# Patient Record
Sex: MALE | Race: WHITE | NOT HISPANIC OR LATINO | Employment: FULL TIME | URBAN - METROPOLITAN AREA
[De-identification: names, ages, dates, MRNs, and addresses within clinical notes are randomized per-mention and may not be internally consistent; named-entity substitution may affect disease eponyms.]

---

## 2017-03-13 ENCOUNTER — ALLSCRIPTS OFFICE VISIT (OUTPATIENT)
Dept: OTHER | Facility: OTHER | Age: 52
End: 2017-03-13

## 2017-03-13 DIAGNOSIS — Z13.220 ENCOUNTER FOR SCREENING FOR LIPOID DISORDERS: ICD-10-CM

## 2017-03-13 DIAGNOSIS — E66.3 OVERWEIGHT: ICD-10-CM

## 2017-03-13 DIAGNOSIS — R73.03 PREDIABETES: ICD-10-CM

## 2017-03-13 DIAGNOSIS — Z12.5 ENCOUNTER FOR SCREENING FOR MALIGNANT NEOPLASM OF PROSTATE: ICD-10-CM

## 2017-03-13 DIAGNOSIS — R20.9 DISTURBANCE OF SKIN SENSATION: ICD-10-CM

## 2017-03-23 ENCOUNTER — LAB CONVERSION - ENCOUNTER (OUTPATIENT)
Dept: OTHER | Facility: OTHER | Age: 52
End: 2017-03-23

## 2017-03-23 LAB
25(OH)D3 SERPL-MCNC: 24 NG/ML (ref 30–100)
A/G RATIO (HISTORICAL): 2 (CALC) (ref 1–2.5)
ALBUMIN SERPL BCP-MCNC: 4.3 G/DL (ref 3.6–5.1)
ALP SERPL-CCNC: 56 U/L (ref 40–115)
ALT SERPL W P-5'-P-CCNC: 21 U/L (ref 9–46)
AST SERPL W P-5'-P-CCNC: 22 U/L (ref 10–35)
BACTERIA UR QL AUTO: NORMAL /HPF
BASOPHILS # BLD AUTO: 0.3 %
BASOPHILS # BLD AUTO: 19 CELLS/UL (ref 0–200)
BILIRUB SERPL-MCNC: 0.7 MG/DL (ref 0.2–1.2)
BILIRUB UR QL STRIP: NEGATIVE
BUN SERPL-MCNC: 17 MG/DL (ref 7–25)
BUN/CREA RATIO (HISTORICAL): ABNORMAL (CALC) (ref 6–22)
CALCIUM SERPL-MCNC: 9.2 MG/DL (ref 8.6–10.3)
CHLORIDE SERPL-SCNC: 103 MMOL/L (ref 98–110)
CHOLEST SERPL-MCNC: 159 MG/DL (ref 125–200)
CHOLEST/HDLC SERPL: 3.8 (CALC)
CO2 SERPL-SCNC: 29 MMOL/L (ref 20–31)
COLOR UR: YELLOW
COMMENT (HISTORICAL): CLEAR
CREAT SERPL-MCNC: 0.99 MG/DL (ref 0.7–1.33)
CULTURE RESULT (HISTORICAL): NORMAL
DEPRECATED RDW RBC AUTO: 13.3 % (ref 11–15)
EGFR AFRICAN AMERICAN (HISTORICAL): 100 ML/MIN/1.73M2
EGFR-AMERICAN CALC (HISTORICAL): 87 ML/MIN/1.73M2
EOSINOPHIL # BLD AUTO: 145 CELLS/UL (ref 15–500)
EOSINOPHIL # BLD AUTO: 2.3 %
FECAL OCCULT BLOOD DIAGNOSTIC (HISTORICAL): NEGATIVE
GAMMA GLOBULIN (HISTORICAL): 2.2 G/DL (CALC) (ref 1.9–3.7)
GLUCOSE (HISTORICAL): 108 MG/DL (ref 65–99)
GLUCOSE (HISTORICAL): NEGATIVE
HBA1C MFR BLD HPLC: 5.8 % OF TOTAL HGB
HCT VFR BLD AUTO: 42.2 % (ref 38.5–50)
HDLC SERPL-MCNC: 42 MG/DL
HGB BLD-MCNC: 14.2 G/DL (ref 13.2–17.1)
HYALINE CASTS #/AREA URNS LPF: NORMAL /LPF
KETONES UR STRIP-MCNC: NEGATIVE MG/DL
LDL CHOLESTEROL (HISTORICAL): 100 MG/DL (CALC)
LEUKOCYTE ESTERASE UR QL STRIP: NEGATIVE
LYMPHOCYTES # BLD AUTO: 2155 CELLS/UL (ref 850–3900)
LYMPHOCYTES # BLD AUTO: 34.2 %
MCH RBC QN AUTO: 28.8 PG (ref 27–33)
MCHC RBC AUTO-ENTMCNC: 33.7 G/DL (ref 32–36)
MCV RBC AUTO: 85.4 FL (ref 80–100)
MONOCYTES # BLD AUTO: 435 CELLS/UL (ref 200–950)
MONOCYTES (HISTORICAL): 6.9 %
NEUTROPHILS # BLD AUTO: 3547 CELLS/UL (ref 1500–7800)
NEUTROPHILS # BLD AUTO: 56.3 %
NITRITE UR QL STRIP: NEGATIVE
NON-HDL-CHOL (CHOL-HDL) (HISTORICAL): 117 MG/DL (CALC)
PH UR STRIP.AUTO: 6 [PH] (ref 5–8)
PLATELET # BLD AUTO: 216 THOUSAND/UL (ref 140–400)
PMV BLD AUTO: 8.9 FL (ref 7.5–12.5)
POTASSIUM SERPL-SCNC: 4.3 MMOL/L (ref 3.5–5.3)
PROSTATE SPECIFIC ANTIGEN TOTAL (HISTORICAL): 1.7 NG/ML
PROT UR STRIP-MCNC: NEGATIVE MG/DL
RBC # BLD AUTO: 4.94 MILLION/UL (ref 4.2–5.8)
RBC (HISTORICAL): NORMAL /HPF
SODIUM SERPL-SCNC: 138 MMOL/L (ref 135–146)
SP GR UR STRIP.AUTO: 1.01 (ref 1–1.03)
SQUAMOUS EPITHELIAL CELLS (HISTORICAL): NORMAL /HPF
TOTAL PROTEIN (HISTORICAL): 6.5 G/DL (ref 6.1–8.1)
TRIGL SERPL-MCNC: 87 MG/DL
TSH SERPL DL<=0.05 MIU/L-ACNC: 4.41 MIU/L (ref 0.4–4.5)
VIT B12 SERPL-MCNC: 264 PG/ML (ref 200–1100)
WBC # BLD AUTO: 6.3 THOUSAND/UL (ref 3.8–10.8)
WBC # BLD AUTO: NORMAL /HPF

## 2017-04-02 ENCOUNTER — OFFICE VISIT (OUTPATIENT)
Dept: URGENT CARE | Facility: MEDICAL CENTER | Age: 52
End: 2017-04-02
Payer: COMMERCIAL

## 2017-04-02 PROCEDURE — G0382 LEV 3 HOSP TYPE B ED VISIT: HCPCS

## 2017-04-02 PROCEDURE — S9083 URGENT CARE CENTER GLOBAL: HCPCS

## 2017-07-03 ENCOUNTER — ALLSCRIPTS OFFICE VISIT (OUTPATIENT)
Dept: OTHER | Facility: OTHER | Age: 52
End: 2017-07-03

## 2017-07-03 DIAGNOSIS — M79.671 PAIN OF RIGHT FOOT: ICD-10-CM

## 2017-07-03 DIAGNOSIS — R73.01 IMPAIRED FASTING GLUCOSE: ICD-10-CM

## 2017-07-03 DIAGNOSIS — R20.9 DISTURBANCE OF SKIN SENSATION: ICD-10-CM

## 2017-07-03 DIAGNOSIS — E55.9 VITAMIN D DEFICIENCY: ICD-10-CM

## 2017-07-03 DIAGNOSIS — M79.604 PAIN OF RIGHT LEG: ICD-10-CM

## 2017-07-10 ENCOUNTER — HOSPITAL ENCOUNTER (OUTPATIENT)
Dept: ULTRASOUND IMAGING | Facility: HOSPITAL | Age: 52
Discharge: HOME | End: 2017-07-10
Attending: FAMILY MEDICINE
Payer: COMMERCIAL

## 2017-07-10 DIAGNOSIS — M79.604 PAIN OF RIGHT LEG: ICD-10-CM

## 2017-07-10 PROCEDURE — 76882 US LMTD JT/FCL EVL NVASC XTR: CPT

## 2017-07-11 ENCOUNTER — GENERIC CONVERSION - ENCOUNTER (OUTPATIENT)
Dept: OTHER | Facility: OTHER | Age: 52
End: 2017-07-11

## 2017-07-13 ENCOUNTER — APPOINTMENT (OUTPATIENT)
Dept: RADIOLOGY | Facility: CLINIC | Age: 52
End: 2017-07-13
Payer: COMMERCIAL

## 2017-07-13 ENCOUNTER — TRANSCRIBE ORDERS (OUTPATIENT)
Dept: LAB | Facility: CLINIC | Age: 52
End: 2017-07-13

## 2017-07-13 ENCOUNTER — APPOINTMENT (OUTPATIENT)
Dept: LAB | Facility: CLINIC | Age: 52
End: 2017-07-13
Payer: COMMERCIAL

## 2017-07-13 DIAGNOSIS — R20.9 DISTURBANCE OF SKIN SENSATION: ICD-10-CM

## 2017-07-13 DIAGNOSIS — R73.01 IMPAIRED FASTING GLUCOSE: ICD-10-CM

## 2017-07-13 DIAGNOSIS — M79.671 PAIN OF RIGHT FOOT: ICD-10-CM

## 2017-07-13 DIAGNOSIS — E55.9 VITAMIN D DEFICIENCY: ICD-10-CM

## 2017-07-13 LAB
25(OH)D3 SERPL-MCNC: 28.9 NG/ML (ref 30–100)
CRP SERPL QL: 3.1 MG/L
ERYTHROCYTE [SEDIMENTATION RATE] IN BLOOD: 4 MM/HOUR (ref 0–10)
EST. AVERAGE GLUCOSE BLD GHB EST-MCNC: 120 MG/DL
FOLATE SERPL-MCNC: 19.3 NG/ML (ref 3.1–17.5)
HBA1C MFR BLD: 5.8 % (ref 4.2–6.3)
URATE SERPL-MCNC: 7.3 MG/DL (ref 4.2–8)
VIT B12 SERPL-MCNC: 301 PG/ML (ref 100–900)

## 2017-07-13 PROCEDURE — 85652 RBC SED RATE AUTOMATED: CPT

## 2017-07-13 PROCEDURE — 84550 ASSAY OF BLOOD/URIC ACID: CPT

## 2017-07-13 PROCEDURE — 83036 HEMOGLOBIN GLYCOSYLATED A1C: CPT

## 2017-07-13 PROCEDURE — 86140 C-REACTIVE PROTEIN: CPT

## 2017-07-13 PROCEDURE — 82306 VITAMIN D 25 HYDROXY: CPT

## 2017-07-13 PROCEDURE — 36415 COLL VENOUS BLD VENIPUNCTURE: CPT

## 2017-07-13 PROCEDURE — 82746 ASSAY OF FOLIC ACID SERUM: CPT

## 2017-07-13 PROCEDURE — 73630 X-RAY EXAM OF FOOT: CPT

## 2017-07-13 PROCEDURE — 82607 VITAMIN B-12: CPT

## 2017-07-14 ENCOUNTER — GENERIC CONVERSION - ENCOUNTER (OUTPATIENT)
Dept: OTHER | Facility: OTHER | Age: 52
End: 2017-07-14

## 2017-07-17 ENCOUNTER — GENERIC CONVERSION - ENCOUNTER (OUTPATIENT)
Dept: OTHER | Facility: OTHER | Age: 52
End: 2017-07-17

## 2017-07-24 DIAGNOSIS — E55.9 VITAMIN D DEFICIENCY: ICD-10-CM

## 2017-07-24 DIAGNOSIS — R73.03 PREDIABETES: ICD-10-CM

## 2017-07-24 DIAGNOSIS — I83.899 VARICOSE VEINS OF UNSPECIFIED LOWER EXTREMITIES WITH OTHER COMPLICATIONS (CODE): ICD-10-CM

## 2017-07-24 DIAGNOSIS — R20.9 DISTURBANCE OF SKIN SENSATION: ICD-10-CM

## 2017-08-23 ENCOUNTER — ALLSCRIPTS OFFICE VISIT (OUTPATIENT)
Dept: OTHER | Facility: OTHER | Age: 52
End: 2017-08-23

## 2017-08-30 ENCOUNTER — ALLSCRIPTS OFFICE VISIT (OUTPATIENT)
Dept: OTHER | Facility: OTHER | Age: 52
End: 2017-08-30

## 2017-09-20 ENCOUNTER — HOSPITAL ENCOUNTER (OUTPATIENT)
Dept: NON INVASIVE DIAGNOSTICS | Facility: CLINIC | Age: 52
Discharge: HOME | End: 2017-09-20
Payer: COMMERCIAL

## 2017-09-20 DIAGNOSIS — I83.899 VARICOSE VEINS OF UNSPECIFIED LOWER EXTREMITIES WITH OTHER COMPLICATIONS (CODE): ICD-10-CM

## 2017-09-20 PROCEDURE — 93971 EXTREMITY STUDY: CPT

## 2017-10-04 ENCOUNTER — GENERIC CONVERSION - ENCOUNTER (OUTPATIENT)
Dept: OTHER | Facility: OTHER | Age: 52
End: 2017-10-04

## 2017-10-28 ENCOUNTER — WALK IN (OUTPATIENT)
Dept: URGENT CARE | Age: 52
End: 2017-10-28

## 2017-10-28 VITALS
DIASTOLIC BLOOD PRESSURE: 86 MMHG | SYSTOLIC BLOOD PRESSURE: 126 MMHG | OXYGEN SATURATION: 97 % | TEMPERATURE: 97.5 F | RESPIRATION RATE: 20 BRPM | HEART RATE: 71 BPM

## 2017-10-28 DIAGNOSIS — J01.00 ACUTE MAXILLARY SINUSITIS, RECURRENCE NOT SPECIFIED: Primary | ICD-10-CM

## 2017-10-28 DIAGNOSIS — J20.9 ACUTE BRONCHITIS, UNSPECIFIED ORGANISM: ICD-10-CM

## 2017-10-28 PROCEDURE — 99204 OFFICE O/P NEW MOD 45 MIN: CPT | Performed by: FAMILY MEDICINE

## 2017-10-28 RX ORDER — DEXTROMETHORPHAN HYDROBROMIDE AND PROMETHAZINE HYDROCHLORIDE 15; 6.25 MG/5ML; MG/5ML
5 SYRUP ORAL 4 TIMES DAILY PRN
Qty: 120 ML | Refills: 0 | Status: SHIPPED | OUTPATIENT
Start: 2017-10-28

## 2017-10-28 RX ORDER — FLUTICASONE PROPIONATE 50 MCG
2 SPRAY, SUSPENSION (ML) NASAL DAILY PRN
Qty: 16 G | Refills: 0 | Status: SHIPPED | OUTPATIENT
Start: 2017-10-28

## 2017-10-28 RX ORDER — AZITHROMYCIN 250 MG/1
TABLET, FILM COATED ORAL
Qty: 6 TABLET | Refills: 0 | Status: SHIPPED | OUTPATIENT
Start: 2017-10-28 | End: 2017-11-02

## 2017-11-29 RX ORDER — MULTIVIT-MIN/IRON/FOLIC ACID/K 18-600-40
1 CAPSULE ORAL
COMMUNITY

## 2017-11-29 RX ORDER — DIPHENOXYLATE HYDROCHLORIDE AND ATROPINE SULFATE 2.5; .025 MG/1; MG/1
1 TABLET ORAL
COMMUNITY

## 2017-11-29 RX ORDER — IBUPROFEN 200 MG
TABLET ORAL EVERY 6 HOURS PRN
COMMUNITY
End: 2019-02-09 | Stop reason: ALTCHOICE

## 2017-11-29 NOTE — PRE-PROCEDURE INSTRUCTIONS
Pre-Surgery Instructions:   Medication Instructions    aspirin 81 MG tablet Instructed patient per Anesthesia Guidelines   Cholecalciferol (VITAMIN D) 2000 units CAPS Instructed patient per Anesthesia Guidelines   ibuprofen (MOTRIN) 200 mg tablet Instructed patient per Anesthesia Guidelines   multivitamin (THERAGRAN) TABS Instructed patient per Anesthesia Guidelines  Pt instructed to stop supplements and aspirin starting tomorrow for surgery on Friday  No medications needed am of surgery  Patient state understanding

## 2017-11-29 NOTE — PROGRESS NOTES
Pt instructed in use of chlorhexidine for pre op bathing per hospital protocol  Patient states understanding

## 2017-11-30 ENCOUNTER — ANESTHESIA EVENT (OUTPATIENT)
Dept: PERIOP | Facility: HOSPITAL | Age: 52
End: 2017-11-30
Payer: COMMERCIAL

## 2017-12-01 ENCOUNTER — ANESTHESIA (OUTPATIENT)
Dept: PERIOP | Facility: HOSPITAL | Age: 52
End: 2017-12-01
Payer: COMMERCIAL

## 2017-12-01 ENCOUNTER — HOSPITAL ENCOUNTER (OUTPATIENT)
Facility: HOSPITAL | Age: 52
Setting detail: OUTPATIENT SURGERY
Discharge: HOME | End: 2017-12-01
Attending: PODIATRIST | Admitting: PODIATRIST
Payer: COMMERCIAL

## 2017-12-01 ENCOUNTER — APPOINTMENT (OUTPATIENT)
Dept: RADIOLOGY | Facility: HOSPITAL | Age: 52
End: 2017-12-01
Payer: COMMERCIAL

## 2017-12-01 VITALS
HEART RATE: 61 BPM | OXYGEN SATURATION: 98 % | BODY MASS INDEX: 34.02 KG/M2 | HEIGHT: 71 IN | SYSTOLIC BLOOD PRESSURE: 109 MMHG | WEIGHT: 243 LBS | RESPIRATION RATE: 16 BRPM | TEMPERATURE: 97.4 F | DIASTOLIC BLOOD PRESSURE: 66 MMHG

## 2017-12-01 PROCEDURE — 73630 X-RAY EXAM OF FOOT: CPT

## 2017-12-01 RX ORDER — ONDANSETRON 2 MG/ML
4 INJECTION INTRAMUSCULAR; INTRAVENOUS EVERY 6 HOURS PRN
Status: DISCONTINUED | OUTPATIENT
Start: 2017-12-01 | End: 2017-12-01 | Stop reason: HOSPADM

## 2017-12-01 RX ORDER — KETOROLAC TROMETHAMINE 30 MG/ML
INJECTION, SOLUTION INTRAMUSCULAR; INTRAVENOUS AS NEEDED
Status: DISCONTINUED | OUTPATIENT
Start: 2017-12-01 | End: 2017-12-01 | Stop reason: SURG

## 2017-12-01 RX ORDER — BUPIVACAINE HYDROCHLORIDE 5 MG/ML
INJECTION, SOLUTION PERINEURAL AS NEEDED
Status: DISCONTINUED | OUTPATIENT
Start: 2017-12-01 | End: 2017-12-01 | Stop reason: HOSPADM

## 2017-12-01 RX ORDER — LIDOCAINE HYDROCHLORIDE 10 MG/ML
INJECTION, SOLUTION INFILTRATION; PERINEURAL AS NEEDED
Status: DISCONTINUED | OUTPATIENT
Start: 2017-12-01 | End: 2017-12-01 | Stop reason: SURG

## 2017-12-01 RX ORDER — MAGNESIUM HYDROXIDE 1200 MG/15ML
LIQUID ORAL AS NEEDED
Status: DISCONTINUED | OUTPATIENT
Start: 2017-12-01 | End: 2017-12-01 | Stop reason: HOSPADM

## 2017-12-01 RX ORDER — FENTANYL CITRATE/PF 50 MCG/ML
25 SYRINGE (ML) INJECTION
Status: DISCONTINUED | OUTPATIENT
Start: 2017-12-01 | End: 2017-12-01 | Stop reason: HOSPADM

## 2017-12-01 RX ORDER — ONDANSETRON 2 MG/ML
4 INJECTION INTRAMUSCULAR; INTRAVENOUS ONCE AS NEEDED
Status: DISCONTINUED | OUTPATIENT
Start: 2017-12-01 | End: 2017-12-01 | Stop reason: HOSPADM

## 2017-12-01 RX ORDER — OXYCODONE HYDROCHLORIDE 5 MG/1
5 TABLET ORAL EVERY 4 HOURS PRN
Status: DISCONTINUED | OUTPATIENT
Start: 2017-12-01 | End: 2017-12-01 | Stop reason: HOSPADM

## 2017-12-01 RX ORDER — PROPOFOL 10 MG/ML
INJECTION, EMULSION INTRAVENOUS AS NEEDED
Status: DISCONTINUED | OUTPATIENT
Start: 2017-12-01 | End: 2017-12-01 | Stop reason: SURG

## 2017-12-01 RX ORDER — MIDAZOLAM HYDROCHLORIDE 1 MG/ML
INJECTION INTRAMUSCULAR; INTRAVENOUS AS NEEDED
Status: DISCONTINUED | OUTPATIENT
Start: 2017-12-01 | End: 2017-12-01 | Stop reason: SURG

## 2017-12-01 RX ORDER — ONDANSETRON 2 MG/ML
INJECTION INTRAMUSCULAR; INTRAVENOUS AS NEEDED
Status: DISCONTINUED | OUTPATIENT
Start: 2017-12-01 | End: 2017-12-01 | Stop reason: SURG

## 2017-12-01 RX ORDER — SODIUM CHLORIDE 9 MG/ML
125 INJECTION, SOLUTION INTRAVENOUS CONTINUOUS
Status: DISCONTINUED | OUTPATIENT
Start: 2017-12-01 | End: 2017-12-01 | Stop reason: HOSPADM

## 2017-12-01 RX ADMIN — SODIUM CHLORIDE 125 ML/HR: 0.9 INJECTION, SOLUTION INTRAVENOUS at 06:35

## 2017-12-01 RX ADMIN — DEXAMETHASONE SODIUM PHOSPHATE 4 MG: 10 INJECTION INTRAMUSCULAR; INTRAVENOUS at 08:08

## 2017-12-01 RX ADMIN — PROPOFOL 200 MG: 10 INJECTION, EMULSION INTRAVENOUS at 07:30

## 2017-12-01 RX ADMIN — SODIUM CHLORIDE: 0.9 INJECTION, SOLUTION INTRAVENOUS at 07:40

## 2017-12-01 RX ADMIN — LIDOCAINE HYDROCHLORIDE 80 MG: 10 INJECTION, SOLUTION INFILTRATION; PERINEURAL at 07:30

## 2017-12-01 RX ADMIN — MIDAZOLAM HYDROCHLORIDE 4 MG: 1 INJECTION, SOLUTION INTRAMUSCULAR; INTRAVENOUS at 07:24

## 2017-12-01 RX ADMIN — ONDANSETRON HYDROCHLORIDE 4 MG: 2 INJECTION, SOLUTION INTRAVENOUS at 08:08

## 2017-12-01 RX ADMIN — KETOROLAC TROMETHAMINE 30 MG: 30 INJECTION, SOLUTION INTRAMUSCULAR at 08:17

## 2017-12-01 NOTE — DISCHARGE SUMMARY
Discharge Summary Outpatient Procedure Podiatry- Jyoti Dang 45 y o  male MRN: [id number]    Unit/Bed#: OR POOL Encounter: [id number]    Admission Date: 12/1/2017     Admitting Diagnosis: Hallux rigidus of right foot [M20 21]    Discharge Diagnosis: same    Procedures Performed: CHEILECTOMY:   Right foot    Complications: none    Condition at Discharge: stable    Discharge instructions/Information to patient and family:   See after visit summary for information provided to patient and family  Provisions for Follow-Up Care/Important appointments:  See after visit summary for information related to follow-up care and any pertinent home health orders  Discharge Medications:  See after visit summary for reconciled discharge medications provided to patient and family

## 2017-12-01 NOTE — DISCHARGE INSTRUCTIONS
[facility]  Dr Mateusz Saumels  Post-Operative Instructions    1  Take your prescribed medication as directed  2  Upon arrival at home, lie down and elevate your surgical foot on 2 pillows  3  Remain quiet, off your feet as much as possible, for the first 24-48 hours  This is when your feet first swell and may become painful  After 48 hours you may begin limited walking following these restrictions:   Weightbear as tolerated to surgical foot in surgical shoe  4  Drink large quantities of water and citrus fruit juice  Consume no alcohol  Continue a well-balanced diet  5  Report any unusual discomfort or fever to this office  6  A limited amount of discomfort and swelling is to be expected  In some cases the skin may take on a bruised appearance  The surgical solution that was applied to your foot prior to the operation is dark in color and the operation site may appear to be oozing when it actually is not  7  A slight amount of blood is to be expected, and is no cause for alarm  Do not remove the dressings  If there is active bleeding and if the bleeding persists, add additional gauze to the bandage, apply direct pressure, elevate your feet and call this office  8  Do not get the dressings wet  As regular bathing may be inconvenient, sponge baths are recommended  9  When anesthesia wears off and if any discomfort should be present, apply an ice pack directly over the operated area for 15 minute intervals for several hours or until the pain leaves  (USE IN EXCESS OF 15 MINUTES COULD CAUSE FROSTBITE)  Do not use hot water bags or electric pads  A convenient icepack can be made by placing ice cubes in a plastic bag and covering this with a towel  10  If necessary, take a mild laxative before retiring  11  Wear your special open shoes anytime you put weight on your foot, even if it is just to walk to the bathroom and back   It will probably be 2 or 3 weeks before you will be permitted to try regular shoes  12  Having performed the operation, we are interested in a prompt recovery  Please cooperate by following the above instructions  13  Please call to confirm your post-op appointment or call with any other questions

## 2017-12-01 NOTE — ANESTHESIA PREPROCEDURE EVALUATION
Review of Systems/Medical History          Cardiovascular  Negative cardio ROS    Pulmonary  Negative pulmonary ROS ,        GI/Hepatic  Negative GI/hepatic ROS          Negative  ROS        Endo/Other  Negative endo/other ROS      GYN       Hematology  Negative hematology ROS      Musculoskeletal  Obesity ,        Neurology  Negative neurology ROS      Psychology   Anxiety,            Physical Exam    Airway    Mallampati score: I  TM Distance: >3 FB  Neck ROM: full     Dental   No notable dental hx     Cardiovascular  Comment: Negative ROS, Rhythm: regular, Rate: normal, Cardiovascular exam normal    Pulmonary  Pulmonary exam normal     Other Findings        Anesthesia Plan  ASA Score- 2       Anesthesia Type- IV sedation with anesthesia with ASA Monitors  Additional Monitors:   Airway Plan:           Induction- intravenous  Informed Consent- Anesthetic plan and risks discussed with patient

## 2018-01-09 NOTE — RESULT NOTES
Verified Results  US EXTREMITY SOFT TISSUE [id number] 04:51PM Andrew Brown Order Number: [id number]   Performing Comments: 46 yo male with cystic area to the right lower leg, medial aspect, mid-tibia  Thanks  Fantasma Madison DO   - Patient Instructions: To schedule this appointment, please contact Central Scheduling at [phone]  Test Name Result Flag Reference   US EXTREMITY SOFT TISSUE (Report)     ULTRASOUND right leg     TECHNIQUE:  Using a high frequency transducer, the area of palpable concern on the right lower leg along the medial aspect of the calf was scanned with static and volumetric images obtained and reviewed  Grayscale and color Doppler techniques were    utilized  INDICATION: Palpable lump along the lower leg which has been present for many years but has increased in size  Patient denies pain  COMPARISON: None     FINDINGS:     The images which were obtained demonstrate an almost anechoic lesion which is 2 0 x 0 8 x 2 3 cm, however, the volumetric images obtained over this area demonstrate conclusively that this represents a dilated segment of a varicose vein which connects    with other less prominent venous channels in the area  Doppler evaluation does not demonstrate any significant internal flow suggesting that this has either extremely slow flow or is thrombosed  IMPRESSION:     Varicose vein  Either very slow flow or thrombosed  Workstation performed: [id number]     Signed by:   Zaria Davis MD   7/10/17       Plan  Varicose veins of lower extremity with other complication    · 1 - Nayeli LINARES, Vaishnavi Stephenson  (Vascular Surgery) Co-Management  *  46 yo male with dilated  varicosity chronically (approx 10 years, but getting worse) to the right lower leg,  apparently thrombosed on US  Thanks    Fantasma Madison DO  Status: Active  Requested  for: [id number]  Care Summary provided  : Yes

## 2018-01-12 VITALS
HEART RATE: 71 BPM | HEIGHT: 70 IN | TEMPERATURE: 97.8 F | SYSTOLIC BLOOD PRESSURE: 118 MMHG | BODY MASS INDEX: 36.43 KG/M2 | WEIGHT: 254.5 LBS | OXYGEN SATURATION: 98 % | DIASTOLIC BLOOD PRESSURE: 80 MMHG

## 2018-01-14 VITALS
SYSTOLIC BLOOD PRESSURE: 108 MMHG | HEART RATE: 84 BPM | BODY MASS INDEX: 36.35 KG/M2 | DIASTOLIC BLOOD PRESSURE: 82 MMHG | RESPIRATION RATE: 16 BRPM | WEIGHT: 253.31 LBS

## 2018-01-14 NOTE — RESULT NOTES
Verified Results  (1) URIC ACID 13Jul2017 02:26PM Jung Birch Order Number: [id number]     Test Name Result Flag Reference   URIC ACID 7 3 mg/dL  4 2-8 0   Specimen collection should occur prior to Metamizole administration due to the potential for falsely depressed results  (1) HEMOGLOBIN A1C 13Jul2017 02:26PM Jung Birch Order Number: [id number]     Test Name Result Flag Reference   HEMOGLOBIN A1C 5 8 %  4 2-6 3   EST  AVG  GLUCOSE 120 mg/dl       (1) VITAMIN D 25-HYDROXY 13Jul2017 02:26PM Jung Birch Order Number: [id number]     Test Name Result Flag Reference   VIT D 25-HYDROX 28 9 ng/mL L 30 0-100 0   This assay is a certified procedure of the CDC Vitamin D Standardization Certification Program (VDSCP)     Deficiency <20ng/ml   Insufficiency 20-30ng/ml   Sufficient  ng/ml     *Patients undergoing fluorescein dye angiography may retain small amounts of fluorescein in the body for 48-72 hours post procedure  Samples containing fluorescein can produce falsely elevated Vitamin D values  If the patient had this procedure, a specimen should be resubmitted post fluorescein clearance       (1) VITAMIN B12 13Jul2017 02:26PM Jung Birch Order Number: [id number]     Test Name Result Flag Reference   VITAMIN B12 301 pg/mL  100-900     (1) FOLATE 13Jul2017 02:26PM Jung Birch Order Number: [id number]     Test Name Result Flag Reference   FOLATE 19 3 ng/mL H 3 1-17 5     (1) SED RATE 13Jul2017 02:26PM Jung Birch Order Number: [id number]     Test Name Result Flag Reference   SED RATE 4 mm/hour  0-10     (1) C-REACTIVE PROTEIN 13Jul2017 02:26PM Jung Birch Order Number: [id number]     Test Name Result Flag Reference   C-REACT PROTEIN 3 1 mg/L H <3 0

## 2018-01-15 VITALS
HEIGHT: 70 IN | DIASTOLIC BLOOD PRESSURE: 76 MMHG | WEIGHT: 251.5 LBS | RESPIRATION RATE: 16 BRPM | SYSTOLIC BLOOD PRESSURE: 104 MMHG | BODY MASS INDEX: 36.01 KG/M2 | HEART RATE: 64 BPM

## 2018-01-17 NOTE — RESULT NOTES
Verified Results  * XR FOOT 3+ VIEW RIGHT [id number] 02:25PM Olu Navarrete Order Number: [id number]   Performing Comments: 46 yo male, with ongoing pain to the MTP joint - right great toe - suspected bunion  Thanks  Biren Gleason DO     Test Name Result Flag Reference   XR FOOT 3+ VW RIGHT (Report)     RIGHT FOOT     INDICATION: Right foot pain  COMPARISON: None     VIEWS: 3     IMAGES: 3     FINDINGS:     There is no acute fracture or dislocation  Marked narrowing and spurring at the 1st metatarsal phalangeal joint  Subchondral sclerosis  Soft tissue prominence medially  An inferior calcaneal spur is present  No lytic or blastic lesions are seen  Soft tissues are unremarkable  IMPRESSION:     No acute osseous abnormality  Marked arthritic changes at the 1st metatarsal-phalangeal joint  Soft tissue prominence compatible with a bunion         Workstation performed: [id number]     Signed by:   Isha Sierra MD   7/16/17

## 2018-01-17 NOTE — RESULT NOTES
Message   Can we mail slip to recheck labs, along with copy of test results  Thanks     Verified Results  (1) CBC/PLT/DIFF 22Mar2017 09:17AM Monica Santana     Test Name Result Flag Reference   WHITE BLOOD CELL COUNT 6 3 Thousand/uL  3 8-10 8   RED BLOOD CELL COUNT 4 94 Million/uL  4 20-5 80   HEMOGLOBIN 14 2 g/dL  13 2-17 1   HEMATOCRIT 42 2 %  38 5-50 0   MCV 85 4 fL  80 0-100 0   MCH 28 8 pg  27 0-33 0   EOSINOPHILS 2 3 %     BASOPHILS 0 3 %     ABSOLUTE MONOCYTES 435 cells/uL  200-950   ABSOLUTE EOSINOPHILS 145 cells/uL     ABSOLUTE BASOPHILS 19 cells/uL  0-200   NEUTROPHILS 56 3 %     LYMPHOCYTES 34 2 %     MONOCYTES 6 9 %     MCHC 33 7 g/dL  32 0-36 0   RDW 13 3 %  11 0-15 0   PLATELET COUNT 756 Thousand/uL  140-400   MPV 8 9 fL  7 5-12 5   ABSOLUTE NEUTROPHILS 3547 cells/uL  1431-4107   ABSOLUTE LYMPHOCYTES 2155 cells/uL  850-3900     (1) COMPREHENSIVE METABOLIC PANEL [id number] 14:87NG Monica Santana     Test Name Result Flag Reference   GLUCOSE 108 mg/dL H 65-99   Fasting reference interval   UREA NITROGEN (BUN) 17 mg/dL  7-25   CREATININE 0 99 mg/dL  0 70-1 33   For patients >52years of age, the reference limit  for Creatinine is approximately 13% higher for people  identified as -American  eGFR NON-AFR   AMERICAN 87 mL/min/1 73m2  > OR = 60   eGFR AFRICAN AMERICAN 100 mL/min/1 73m2  > OR = 60   BUN/CREATININE RATIO   7-50   NOT APPLICABLE (calc)   ALT 21 U/L  9-46   ALBUMIN 4 3 g/dL  3 6-5 1   GLOBULIN 2 2 g/dL (calc)  1 9-3 7   ALBUMIN/GLOBULIN RATIO 2 0 (calc)  1 0-2 5   BILIRUBIN, TOTAL 0 7 mg/dL  0 2-1 2   ALKALINE PHOSPHATASE 56 U/L     AST 22 U/L  10-35   SODIUM 138 mmol/L  135-146   POTASSIUM 4 3 mmol/L  3 5-5 3   CHLORIDE 103 mmol/L     CARBON DIOXIDE 29 mmol/L  20-31   CALCIUM 9 2 mg/dL  8 6-10 3   PROTEIN, TOTAL 6 5 g/dL  6 1-8 1     (1) PSA (SCREEN) (Dx V76 44 Screen for Prostate Cancer) [id number] 09:17AM Monica Santana     Test Name Result Flag Reference   PSA, TOTAL 1 7 ng/mL  < OR = 4 0   This test was performed using the Siemens  chemiluminescent method  Values obtained from  different assay methods cannot be used  interchangeably  PSA levels, regardless of  value, should not be interpreted as absolute  evidence of the presence or absence of disease  (1) URINALYSIS w URINE C/S REFLEX (will reflex a microscopy if leukocytes, occult blood, or nitrites are not within normal limits) [id number] 09:17AM P21yl Case     Test Name Result Flag Reference   COLOR YELLOW  YELLOW   APPEARANCE CLEAR  CLEAR   SPECIFIC GRAVITY 1 006  1 001-1 035   PH 6 0  5 0-8 0   GLUCOSE NEGATIVE  NEGATIVE   BILIRUBIN NEGATIVE  NEGATIVE   RBC NONE SEEN /HPF  < OR = 2   SQUAMOUS EPITHELIAL CELLS NONE SEEN /HPF  < OR = 5   BACTERIA NONE SEEN /HPF  NONE SEEN   HYALINE CAST NONE SEEN /LPF  NONE SEEN   REFLEXIVE URINE CULTURE NO CULTURE INDICATED     KETONES NEGATIVE  NEGATIVE   OCCULT BLOOD NEGATIVE  NEGATIVE   PROTEIN NEGATIVE  NEGATIVE   NITRITE NEGATIVE  NEGATIVE   LEUKOCYTE ESTERASE NEGATIVE  NEGATIVE   WBC NONE SEEN /HPF  < OR = 5     (1) VITAMIN B12 [id number] 09:17AM P21yl Case     Test Name Result Flag Reference   VITAMIN B12 264 pg/mL  200-1100   Please Note: Although the reference range for vitamin  B12 is 200-1100 pg/mL, it has been reported that between  5 and 10% of patients with values between 200 and 400  pg/mL may experience neuropsychiatric and hematologic  abnormalities due to occult B12 deficiency; less than 1%  of patients with values above 400 pg/mL will have symptoms  (Q) LIPID PANEL WITH REFLEX TO DIRECT LDL [id number] 09:17AM P21yl Case     Test Name Result Flag Reference   CHOLESTEROL, TOTAL 159 mg/dL  125-200   HDL CHOLESTEROL 42 mg/dL  > OR = 40   TRIGLICERIDES 87 mg/dL  <035   LDL-CHOLESTEROL 100 mg/dL (calc)  <130   Desirable range <100 mg/dL for patients with CHD or  diabetes and <70 mg/dL for diabetic patients with  known heart disease     CHOL/HDLC RATIO 3 8 (calc)  < OR = 5  0   NON HDL CHOLESTEROL 117 mg/dL (calc)     Target for non-HDL cholesterol is 30 mg/dL higher than   LDL cholesterol target  *(Q) VITAMIN D, 25-HYDROXY, LC/MS/MS 22Mar2017 09:17AM Ever Kittitas     Test Name Result Flag Reference   VITAMIN D, 25-OH, TOTAL 24 ng/mL L    Vitamin D Status         25-OH Vitamin D:     Deficiency:                    <20 ng/mL  Insufficiency:             20 - 29 ng/mL  Optimal:                 > or = 30 ng/mL     For 25-OH Vitamin D testing on patients on   D2-supplementation and patients for whom quantitation   of D2 and D3 fractions is required, the QuestAssureD(TM)  25-OH VIT D, (D2,D3), LC/MS/MS is recommended: order   code 05426 (patients >2yrs)  For more information on this test, go to:  [email or web address]  (This link is being provided for   informational/educational purposes only )     (Q) TSH, 3RD GENERATION W/REFLEX TO FT4 22Mar2017 09:17AM Ever Kittitas     Test Name Result Flag Reference   TSH W/REFLEX TO FT4 4 41 mIU/L  0 40-4 50     (Q) HEMOGLOBIN A1c 22Mar2017 09:17AM Ever Kittitas   REPORT COMMENT:  FASTING:YES     Test Name Result Flag Reference   HEMOGLOBIN A1c 5 8 % of total Hgb H <5 7   According to ADA guidelines, hemoglobin A1c <7 0%  represents optimal control in non-pregnant diabetic  patients  Different metrics may apply to specific  patient populations  Standards of Medical Care in    Diabetes Care  2013;36:s11-s66     For the purpose of screening for the presence of  diabetes  <5 7%       Consistent with the absence of diabetes  5 7-6 4%    Consistent with increased risk for diabetes              (prediabetes)  >or=6 5%    Consistent with diabetes     This assay result is consistent with an increased risk  of diabetes  Currently, no consensus exists for use of hemoglobin  A1c for diagnosis of diabetes for children         Plan  Paresthesias/numbness, Prediabetes, Vitamin D deficiency    · (1) BASIC METABOLIC PROFILE; Status:Active; Requested for:[id number];    · (1) HEMOGLOBIN A1C; Status:Active; Requested for:[id number];    · (1) VITAMIN B12; Status:Active; Requested for:[id number];    · (1) VITAMIN D 25-HYDROXY; Status:Active; Requested for:[id number];   Vitamin D deficiency    · Vitamin D (Ergocalciferol) 90989 UNIT Oral Capsule; TAKE 1 CAPSULE WEEKLY  X 8 WEEKS    Discussion/Summary      Blood work results notable for the following:   --Favorable cholesterol numbers   --Mildly elevated fasting + average blood sugar level (A1C), just mildly in "prediabetes" range  Maintaining healthy weight, avoiding excess sugars/carbs in diet will help with this  --Low vitamin D level + low normal vitamin B12 level  Either/both of these may be contributing to the numbness/tingling felt in your feet  Will send in Rx for weekly high dose vitamin D x 8 weeks  In addition, you should start taking a daily OTC vitamin D supplement 4000 IU (units) and daily B-complex supplement  --Will mail lab slip to recheck blood sugar, vitamin levels in 4 months or so  Let me know if you have any questions   --Gus Cueva

## 2018-01-17 NOTE — PROGRESS NOTES
Assessment    1  Well adult exam (V70 0) (Z00 00)   2  Paresthesias/numbness (782 0) (R20 9)   3  Overweight (278 02) (E66 3)   4  Lipid screening (V77 91) (Z13 220)   5  Prediabetes (790 29) (R73 03)   6  Family history of Prostate cancer : Father   9  Prostate cancer screening (V76 44) (Z12 5)   8  Family history of myocardial infarction (V17 3) (Z82 49) : Father   5  Tinea versicolor (111 0) (B36 0)    Plan  FamHx: Family history of myocardial infarction    · Aspirin 81 MG Oral Tablet Delayed Release; take 1 tablet every day  Lipid screening, Overweight, Paresthesias/numbness, Prediabetes, FamHx: Prostate  cancer, Prostate cancer screening    · (1) CBC/PLT/DIFF; Status:Active; Requested for:13Mar2017;    · (1) COMPREHENSIVE METABOLIC PANEL; Status:Active; Requested for:13Mar2017;    · (1) HEMOGLOBIN A1C; Status:Active; Requested for:13Mar2017;    · (1) LIPID PANEL FASTING W DIRECT LDL REFLEX; Status:Active; Requested  for:13Mar2017;    · (1) PSA (SCREEN) (Dx V76 44 Screen for Prostate Cancer); Status:Active; Requested  for:13Mar2017;    · (1) TSH WITH FT4 REFLEX; Status:Active; Requested for:13Mar2017;    · (1) URINALYSIS w URINE C/S REFLEX (will reflex a microscopy if leukocytes, occult  blood, or nitrites are not within normal limits); Status:Active; Requested for:13Mar2017;    · (1) VITAMIN B12; Status:Active; Requested for:13Mar2017;    · (1) VITAMIN D 25-HYDROXY; Status:Active; Requested for:13Mar2017; Overweight    · *1 - [facility]NAG[facility] MEDICAL Physician Referral  Consult Only: the  expectation is that the referring provider will communicate back to the patient on  treatment options  Evaluation and Treatment: the expectation is that the referred to  provider will communicate back to the patient on treatment options    Status: Active   Requested for: 17BIF0243  Care Summary provided  : Yes  Screening for depression    · *VB-Depression Screening; Status:Complete;   Done: 45VFI5236 11:09AM  Tinea versicolor    · Fluconazole 150 MG Oral Tablet; TAKE 1 TABLET DAILY x 5 days  Well adult exam    · Call [phone] if: You have any warning signs of skin cancer ; Status:Complete;    Done: 61GVM6344   · Call 911 if: You experience a new kind of chest pain (angina) or pressure ;  Status:Complete;   Done: 28JOS0693   · Always use a seat belt and shoulder strap when riding or driving a motor vehicle ;  Status:Complete;   Done: 53XMD5778   · Begin a limited exercise program ; Status:Complete;   Done: 74RLZ8956   · Begin or continue regular aerobic exercise  Gradually work up to at least 3 sessions of 30  minutes of exercise a week ; Status:Complete;   Done: 82JYC7858   · Brush your teeth freq1 and floss at least once a day ; Status:Complete;   Done:  29MNI3185   · Decreasing the stress in your life may help your condition improve ; Status:Complete;    Done: 91ITJ4333   · Eat a normal well-balanced diet ; Status:Complete;   Done: 48UIP8232   · Limit your use of alcohol to 2 drinks or cans of beer a day ; Status:Complete;   Done:  77RSH0261   · Stretch and warm up your muscles during the first 10 minutes , then cool down your  muscles for the last 10 minutes of exercise ; Status:Complete;   Done: 11FFD5448   · Use a sun block product with an SPF of 15 or more ; Status:Complete;   Done:  74CBA2793   · Vitamins can help you get daily requirements that your diet may not be giving you ;  Status:Complete;   Done: 09TAM6883   · We recommend routine visits to a dentist ; Status:Complete;   Done: 52DEM7800   · We recommend that you bring your body mass index down to 26 ; Status:Complete;    Done: 58XAP9251   · We recommend that you follow the "Mediterranean diet "; Status:Complete;   Done:  98BCA9424   · We recommend that you follow these rules for gun safety ; Status:Complete;   Done:  37YCF1554    Discussion/Summary  Impression: health maintenance visit   Currently, he eats an adequate diet and has an adequate exercise regimen  Prostate cancer screening: PSA was ordered  Testicular cancer screening: clinical testicular exam was done today  Colorectal cancer screening: colorectal cancer screening is current  Screening lab work includes glucose, lipid profile and urinalysis  The immunizations are up to date  Advice and education were given regarding nutrition, aerobic exercise and weight loss  Patient discussion: discussed with the patient  Preventative + FH prostate cancer + FH MI: Yearly labs ordered including PSA  UTD with colonoscopy, eye exam, immunizations  Recommend daily baby aspirin as preventative  Obesity + prediabetes: Weight loss measures discussed, encouraged  Referral to  weight management  Recheck A1C  Foot paresthesias: Normal exam  Check labs  If normal, may warrant NCS, neuro referral      Tinea versicolor: Oral antifungal previously helpful-->refilled  RTO 1 year  The treatment plan was reviewed with the patient/guardian  The patient/guardian understands and agrees with the treatment plan      Chief Complaint  Pt here for annual physical exam  Pt c/o burning feeling in feet      History of Present Illness  HM, Adult Male: The patient is being seen for a health maintenance evaluation  The last health maintenance visit was 3 year(s) ago  Social History: He is   Work status: working full time  The patient has never smoked cigarettes  He reports rare alcohol use  He has never used illicit drugs  General Health: The patient's health since the last visit is described as good  He has regular dental visits  He denies vision problems  He denies hearing loss  Immunizations status: up to date  Lifestyle:  He consumes a diverse and healthy diet  He has weight concerns  He exercises regularly  He does not use tobacco  He denies alcohol use  He denies drug use  Reproductive health:  the patient is sexually active  He denies erectile dysfunction     Screening: cancer screening reviewed and updated  metabolic screening reviewed and updated  risk screening reviewed and updated  HPI:   Here for routine physical      Knows he needs to lose some weight  Does cardio (PK9) couple times a week, some running  Diet is fair  Tries to get regular fruits/vegetables and to avoid excess sugar/carbs  Bilateral, intermittent burning sensation on bottom of both feet x 1 month  Happens both at rest and with activity  Random  No associated swelling, color changes  No numbess/tingling elsewhere (hands, face, etc)  No vision changes, fatigue, headache, frequent urination, thirst      Previous mildly itchy rash on trunk resolved after couple days of oral medication, but seems like it has been starting to reoccur  Forgot name of medication that dermatologist gave him  No topical treatments  Occasional nighttime heartburn, no issues during the day  No other GI issues including recent stool changes  Had colonoscopy 2 years ago which he states was normal  Told to repeat in 10 years  Father with recent MI (80 years old)  Doing OK  Had eye exam 4 months ago  Glasses UTD  Work going [employer]  Travels to [facility] on occasion  Review of Systems    Constitutional: no fever, not feeling tired and no recent weight loss  Eyes: no eyesight problems  ENT: no sore throat and no hearing loss  Cardiovascular: no chest pain, no palpitations and no extremity edema  Respiratory: no shortness of breath and no cough  Gastrointestinal: as noted in HPI, no nausea, no vomiting, no constipation, no diarrhea and no blood in stools  Genitourinary: no dysuria, no urinary hesitancy, no incontinence, no nocturia and no testicular pain  Musculoskeletal: no arthralgias and no myalgias  Integumentary: no rashes and no skin lesions  Neurological: as noted in HPI, no headache and no dizziness  Psychiatric: no anxiety, no personality change and no depression  Endocrine: no muscle weakness     Hematologic/Lymphatic: no swollen glands, no tendency for easy bleeding and no swollen glands in the neck  Over the past 2 weeks, how often have you been bothered by the following problems? 1 ) Little interest or pleasure in doing things? Not at all    2 ) Feeling down, depressed or hopeless? Not at all  Active Problems    1  Overweight (278 02) (E66 3)   2  Prediabetes (790 29) (R73 03)   3  History of Prehypertension (796 2) (R03 0)    Past Medical History    · Acute pharyngitis (462) (J02 9)   · History of Infectious diarrhea (009 2) (A09)   · Lipid screening (V77 91) (Z13 220)   · Overweight (278 02) (E66 3)   · Paresthesias/numbness (782 0) (R20 9)   · Prediabetes (790 29) (R73 03)   · History of Prehypertension (796 2) (R03 0)   · Prostate cancer screening (V76 44) (Z12 5)   · Tinea versicolor (111 0) (B36 0)   · History of Viral gastroenteritis (008 8) (A08 4)   · Well adult exam (V70 0) (Z00 00)    Surgical History    · History of Appendectomy   · History of Hip Surgery    Family History  Mother    · Family history of Diabetes   · Family history of Hypertension  Father    · Family history of myocardial infarction (V17 3) (Z82 49)   · Family history of Prostate cancer  Paternal Aunt    · Family history of Diabetes    Social History    · Alcohol Use (History)   · Never a smoker   · Never A Smoker   · No drug use   · Social alcohol use (Z78 9)    Current Meds   1  No Reported Medications  Requested for: 93DGZ9592 Recorded    Allergies    1  No Known Drug Allergies    Vitals   Recorded: 23DDX5167 11:02AM   Temperature 97 8 F, Tympanic   Heart Rate 71   Systolic 205, LUE, Sitting   Diastolic 80, LUE, Sitting   Height 5 ft 10 in   Weight 254 lb 8 oz   BMI Calculated 36 52   BSA Calculated 2 31   O2 Saturation 98     Physical Exam    Constitutional   General appearance: No acute distress, well appearing and well nourished  Head and Face   Head and face: Normal     Palpation of the face and sinuses: No sinus tenderness  Eyes   Conjunctiva and lids: No erythema, swelling or discharge  Pupils and irises: Equal, round, reactive to light  Ophthalmoscopic examination: Normal fundi and optic discs  Ears, Nose, Mouth, and Throat   External inspection of ears and nose: Normal     Otoscopic examination: Tympanic membranes translucent with normal light reflex  Canals patent without erythema  Nasal mucosa, septum, and turbinates: Normal without edema or erythema  Oropharynx: Normal with no erythema, edema, exudate or lesions  Neck   Neck: Supple, symmetric, trachea midline, no masses  Thyroid: Normal, no thyromegaly  Pulmonary   Respiratory effort: No increased work of breathing or signs of respiratory distress  Auscultation of lungs: Clear to auscultation  Cardiovascular   Auscultation of heart: Normal rate and rhythm, normal S1 and S2, no murmurs  Carotid pulses: 2+ bilaterally  Pedal pulses: 2+ bilaterally  Examination of extremities for edema and/or varicosities: Normal     Chest   Breasts: Normal, no dimpling or skin changes appreciated  Palpation of breasts and axillae: Normal, no masses palpated  Abdomen   Abdomen: Non-tender, no masses  Liver and spleen: No hepatomegaly or splenomegaly  Examination for hernias: No hernias appreciated  Anus, perineum, and rectum: Normal sphincter tone, no masses, no prolapse  Stool sample for occult blood: Negative  Genitourinary   Scrotal contents: Normal testes, no masses  Penis: Normal, no lesions  Digital rectal exam of prostate: Abnormal   Prostate nontender, with mild symmetric enlargement, no nodularity  Lymphatic   Palpation of lymph nodes in neck: No lymphadenopathy  Musculoskeletal   Gait and station: Normal     Skin   Skin and subcutaneous tissue: Abnormal   Back > chest with scattered, hypopigmented, blotchy patchs with faint overlying erythema/white scale in areas  Neurologic   Reflexes: 2+ and symmetric  Psychiatric   Orientation to person, place and time: Normal     Mood and affect: Normal       Socks and shoes removed, the Right Foot: the foot was normal, no swelling, no erythema  The toes on the right were normal    The sensory exam showed  normal vibratory sensation at the level of the toes on the right  Normal tactile sensation with monofilament testing throughout the right foot  Socks and shoes removed, Left Foot: the foot was normal, no swelling, no erythema  The toes on the left were normal    The sensory exam showed  normal vibratory sensation at the level of the toes on the left  Normal tactile sensation with monofilament testing throughout the left foot  Results/Data  PHQ-2 Adult Depression Screening 56DOV5068 12:23PM [employer]     Test Name Result Flag Reference   PHQ-2 Adult Depression Score 0     Over the last two weeks, how often have you been bothered by any of the following problems?   Little interest or pleasure in doing things: Not at all - 0  Feeling down, depressed, or hopeless: Not at all - 0   PHQ-2 Adult Depression Screening Negative       *VB-Depression Screening 70NPB9497 11:09AM [employer]     Test Name Result Flag Reference   Depression Scale Result      Depression Screen - Negative For Symptoms       Signatures   Electronically signed by : ANITA Callaway; Mar 13 2017 12:22PM EST                       (Author)    Electronically signed by : Quang Nash DO; Mar 16 2017  3:04PM EST                       (Author)

## 2018-01-22 VITALS
HEART RATE: 76 BPM | BODY MASS INDEX: 35.98 KG/M2 | RESPIRATION RATE: 16 BRPM | DIASTOLIC BLOOD PRESSURE: 84 MMHG | WEIGHT: 257 LBS | TEMPERATURE: 97.3 F | SYSTOLIC BLOOD PRESSURE: 122 MMHG | HEIGHT: 71 IN

## 2018-03-30 ENCOUNTER — OFFICE VISIT (OUTPATIENT)
Dept: FAMILY MEDICINE CLINIC | Facility: CLINIC | Age: 53
End: 2018-03-30
Payer: COMMERCIAL

## 2018-03-30 VITALS
WEIGHT: 250.2 LBS | BODY MASS INDEX: 35.82 KG/M2 | HEIGHT: 70 IN | RESPIRATION RATE: 16 BRPM | HEART RATE: 72 BPM | SYSTOLIC BLOOD PRESSURE: 110 MMHG | DIASTOLIC BLOOD PRESSURE: 72 MMHG

## 2018-03-30 DIAGNOSIS — E55.9 VITAMIN D DEFICIENCY: Chronic | ICD-10-CM

## 2018-03-30 DIAGNOSIS — Z13.1 SCREENING FOR DIABETES MELLITUS: ICD-10-CM

## 2018-03-30 DIAGNOSIS — Z12.5 SCREENING FOR PROSTATE CANCER: ICD-10-CM

## 2018-03-30 DIAGNOSIS — R73.01 IMPAIRED FASTING GLUCOSE: Chronic | ICD-10-CM

## 2018-03-30 DIAGNOSIS — Z00.00 WELLNESS EXAMINATION: Primary | ICD-10-CM

## 2018-03-30 PROCEDURE — 99396 PREV VISIT EST AGE 40-64: CPT | Performed by: FAMILY MEDICINE

## 2018-03-30 RX ORDER — ASPIRIN 81 MG/1
1 TABLET, CHEWABLE ORAL DAILY
COMMUNITY
Start: 2017-03-13 | End: 2018-09-30

## 2018-03-30 NOTE — PATIENT INSTRUCTIONS
Thank you for enrolling in [facility]  Please follow the instructions below to securely access your online medical record  [employer] allows you to send messages to your doctor, view your test results, renew your prescriptions, schedule appointments, and more  [address] uses Single Sign on (SSO) Technology for you to log in and access our [facility] SPECIALTY [facility], including [facility]  No more remembering multiple user names and passwords! We are going to guide you through, step by step, to help you set up your [facility] account which will provide access to your [employer] account  How Do I Sign Up? 1  In your Internet browser, go to [email or web address]"hart       2  Click on the   [employer] patient account and then click Dont have an                 Account? Create one now      3  Enter your demographic information and chose a user name (email address) and password  Think of one that is secure and easy to remember  Enter a Referral code if you have one (this is not your [employer] code ) Accept the Terms and Conditions and the Privacy Policy  4  Select your security questions that you will use to reset your password should you forget it  Click Submit  5  Enter your [employer] Activation Code exactly as it appears below  You will not need to use this code after you have completed the sign-up process  If you do not sign up before the expiration date, you must request a new code  [facility] Activation Code: [email or web address]  Expires: 4/2/2018  1:08 PM    6  Confirm your email address  An email confirmation was sent to you  Please open that email and click Confirm your Email   You should then be redirected to our [employer] Single sign on page, where you will log on with the user name and password you created! Proceed to the [facility] Icon to view your personal health information          Additional Information  If you have questions, you can e-mail patient  [email or web address] or call [phone] to talk to our customer support staff  Remember, MyChart is NOT to be used for urgent needs  For medical emergencies, dial 911

## 2018-03-30 NOTE — PROGRESS NOTES
Assessment/Plan:    No problem-specific Assessment & Plan notes found for this encounter  Diagnoses and all orders for this visit:    Wellness examination  Comments:  Colon cancer screening and Adacel are UTD  FBW ordered for the pt  Orders:  -     TSH, 3rd generation with T4 reflex; Future    Screening for diabetes mellitus  -     CBC and differential; Future  -     Comprehensive metabolic panel; Future  -     Lipid panel; Future    Screening for prostate cancer  Comments:  PSA included with FBW  Orders:  -     PSA    Impaired fasting glucose  Comments:  Sj Charles is exercising regularly  We also discussed following a lower carb diet, to assist with weight loss  A1c included with FBW  Last A1c at 5 8% last year  Orders:  -     HEMOGLOBIN A1C W/ EAG ESTIMATION; Future  -     TSH, 3rd generation with T4 reflex; Future    Vitamin D deficiency  Comments:  Vit D level included with FBW  Orders:  -     Vitamin D 25 hydroxy; Future    Other orders  -     aspirin 81 mg chewable tablet; Chew 1 tablet daily          Subjective:      Patient ID: Marco Fisher is a 45 y o  male  Colon cancer screening is UTD  Seeing his Dentist regularly  He is exercising - has a [place] and rowing machine at home  Adacel is UTD  The following portions of the patient's history were reviewed and updated as appropriate: allergies, current medications, past family history, past social history, past surgical history and problem list     Review of Systems   Constitutional: Negative for activity change  Respiratory: Negative for shortness of breath  Cardiovascular: Negative for chest pain  Gastrointestinal: Negative for abdominal pain and blood in stool  Genitourinary: Negative for decreased urine volume  No ED  Psychiatric/Behavioral: Negative for dysphoric mood  The patient is not nervous/anxious            Objective:      /72 (BP Location: Right arm, Patient Position: Sitting, Cuff Size: Standard) Pulse 72   Resp 16   Ht 5' 10 2" (1 783 m)   Wt 113 kg (250 lb 3 2 oz)   BMI 35 70 kg/m²          Physical Exam   Constitutional: He is oriented to person, place, and time  He appears well-developed and well-nourished  No distress  HENT:   Head: Normocephalic and atraumatic  Right Ear: Hearing, tympanic membrane, external ear and ear canal normal    Left Ear: Hearing, tympanic membrane, external ear and ear canal normal    Mouth/Throat: Oropharynx is clear and moist  No oropharyngeal exudate  Eyes: Conjunctivae are normal    Neck: Normal range of motion  Neck supple  No thyromegaly present  Cardiovascular: Normal rate, regular rhythm, normal heart sounds and intact distal pulses  Exam reveals no gallop and no friction rub  No murmur heard  Pulmonary/Chest: Effort normal and breath sounds normal  No respiratory distress  He has no wheezes  He has no rales  Abdominal: Soft  Bowel sounds are normal  He exhibits no distension and no mass  There is no tenderness  There is no rebound and no guarding  Genitourinary: Rectum normal and prostate normal    Musculoskeletal: He exhibits no edema  Lymphadenopathy:     He has no cervical adenopathy  Neurological: He is alert and oriented to person, place, and time  Skin: He is not diaphoretic  Psychiatric: He has a normal mood and affect  His behavior is normal  Judgment and thought content normal    Nursing note and vitals reviewed

## 2018-05-24 ENCOUNTER — LAB (OUTPATIENT)
Dept: LAB | Facility: CLINIC | Age: 53
End: 2018-05-24
Payer: COMMERCIAL

## 2018-05-24 DIAGNOSIS — R73.01 IMPAIRED FASTING GLUCOSE: ICD-10-CM

## 2018-05-24 DIAGNOSIS — Z12.5 PROSTATE CANCER SCREENING: ICD-10-CM

## 2018-05-24 DIAGNOSIS — E55.9 VITAMIN D DEFICIENCY: Chronic | ICD-10-CM

## 2018-05-24 DIAGNOSIS — Z13.1 SCREENING FOR DIABETES MELLITUS: ICD-10-CM

## 2018-05-24 DIAGNOSIS — Z00.00 WELLNESS EXAMINATION: ICD-10-CM

## 2018-05-24 LAB
25(OH)D3 SERPL-MCNC: 29.8 NG/ML (ref 30–100)
ALBUMIN SERPL BCP-MCNC: 4.1 G/DL (ref 3.5–5)
ALP SERPL-CCNC: 60 U/L (ref 46–116)
ALT SERPL W P-5'-P-CCNC: 31 U/L (ref 12–78)
ANION GAP SERPL CALCULATED.3IONS-SCNC: 6 MMOL/L (ref 4–13)
AST SERPL W P-5'-P-CCNC: 21 U/L (ref 5–45)
BASOPHILS # BLD AUTO: 0.02 THOUSANDS/ΜL (ref 0–0.1)
BASOPHILS NFR BLD AUTO: 0 % (ref 0–1)
BILIRUB SERPL-MCNC: 0.74 MG/DL (ref 0.2–1)
BUN SERPL-MCNC: 19 MG/DL (ref 5–25)
CALCIUM SERPL-MCNC: 9.1 MG/DL (ref 8.3–10.1)
CHLORIDE SERPL-SCNC: 103 MMOL/L (ref 100–108)
CHOLEST SERPL-MCNC: 169 MG/DL (ref 50–200)
CO2 SERPL-SCNC: 29 MMOL/L (ref 21–32)
CREAT SERPL-MCNC: 0.96 MG/DL (ref 0.6–1.3)
EOSINOPHIL # BLD AUTO: 0.16 THOUSAND/ΜL (ref 0–0.61)
EOSINOPHIL NFR BLD AUTO: 2 % (ref 0–6)
ERYTHROCYTE [DISTWIDTH] IN BLOOD BY AUTOMATED COUNT: 13 % (ref 11.6–15.1)
EST. AVERAGE GLUCOSE BLD GHB EST-MCNC: 126 MG/DL
GFR SERPL CREATININE-BSD FRML MDRD: 89 ML/MIN/1.73SQ M
GLUCOSE P FAST SERPL-MCNC: 101 MG/DL (ref 65–99)
HBA1C MFR BLD: 6 % (ref 4.2–6.3)
HCT VFR BLD AUTO: 42.5 % (ref 36.5–49.3)
HDLC SERPL-MCNC: 43 MG/DL (ref 40–60)
HGB BLD-MCNC: 14.9 G/DL (ref 12–17)
LDLC SERPL CALC-MCNC: 106 MG/DL (ref 0–100)
LYMPHOCYTES # BLD AUTO: 2.74 THOUSANDS/ΜL (ref 0.6–4.47)
LYMPHOCYTES NFR BLD AUTO: 36 % (ref 14–44)
MCH RBC QN AUTO: 30 PG (ref 26.8–34.3)
MCHC RBC AUTO-ENTMCNC: 35.1 G/DL (ref 31.4–37.4)
MCV RBC AUTO: 86 FL (ref 82–98)
MONOCYTES # BLD AUTO: 0.63 THOUSAND/ΜL (ref 0.17–1.22)
MONOCYTES NFR BLD AUTO: 8 % (ref 4–12)
NEUTROPHILS # BLD AUTO: 4.15 THOUSANDS/ΜL (ref 1.85–7.62)
NEUTS SEG NFR BLD AUTO: 54 % (ref 43–75)
NONHDLC SERPL-MCNC: 126 MG/DL
NRBC BLD AUTO-RTO: 0 /100 WBCS
PLATELET # BLD AUTO: 258 THOUSANDS/UL (ref 149–390)
PMV BLD AUTO: 11.2 FL (ref 8.9–12.7)
POTASSIUM SERPL-SCNC: 4.1 MMOL/L (ref 3.5–5.3)
PROT SERPL-MCNC: 7.7 G/DL (ref 6.4–8.2)
RBC # BLD AUTO: 4.97 MILLION/UL (ref 3.88–5.62)
SODIUM SERPL-SCNC: 138 MMOL/L (ref 136–145)
T4 FREE SERPL-MCNC: 0.92 NG/DL (ref 0.76–1.46)
TRIGL SERPL-MCNC: 98 MG/DL
TSH SERPL DL<=0.05 MIU/L-ACNC: 8.59 UIU/ML (ref 0.36–3.74)
WBC # BLD AUTO: 7.71 THOUSAND/UL (ref 4.31–10.16)

## 2018-05-24 PROCEDURE — G0103 PSA SCREENING: HCPCS

## 2018-05-24 PROCEDURE — 84439 ASSAY OF FREE THYROXINE: CPT

## 2018-05-24 PROCEDURE — 80061 LIPID PANEL: CPT

## 2018-05-24 PROCEDURE — 36415 COLL VENOUS BLD VENIPUNCTURE: CPT

## 2018-05-24 PROCEDURE — 83036 HEMOGLOBIN GLYCOSYLATED A1C: CPT

## 2018-05-24 PROCEDURE — 85025 COMPLETE CBC W/AUTO DIFF WBC: CPT

## 2018-05-24 PROCEDURE — 80053 COMPREHEN METABOLIC PANEL: CPT

## 2018-05-24 PROCEDURE — 82306 VITAMIN D 25 HYDROXY: CPT

## 2018-05-24 PROCEDURE — 84443 ASSAY THYROID STIM HORMONE: CPT

## 2018-05-25 DIAGNOSIS — Z12.5 PROSTATE CANCER SCREENING: Primary | ICD-10-CM

## 2018-05-25 LAB — PSA SERPL-MCNC: 1.6 NG/ML (ref 0–4)

## 2018-05-25 NOTE — PROGRESS NOTES
Please let the patient know that their added bloodwork was normal - PSA normal / stable at 1 6  Thanks     Dr Yamileth Morgan

## 2018-09-30 ENCOUNTER — APPOINTMENT (EMERGENCY)
Dept: ULTRASOUND IMAGING | Facility: HOSPITAL | Age: 53
End: 2018-09-30
Payer: COMMERCIAL

## 2018-09-30 ENCOUNTER — HOSPITAL ENCOUNTER (EMERGENCY)
Facility: HOSPITAL | Age: 53
Discharge: HOME | End: 2018-09-30
Attending: EMERGENCY MEDICINE | Admitting: EMERGENCY MEDICINE
Payer: COMMERCIAL

## 2018-09-30 VITALS
HEART RATE: 83 BPM | WEIGHT: 255.29 LBS | BODY MASS INDEX: 36.55 KG/M2 | SYSTOLIC BLOOD PRESSURE: 164 MMHG | HEIGHT: 70 IN | OXYGEN SATURATION: 97 % | DIASTOLIC BLOOD PRESSURE: 99 MMHG | RESPIRATION RATE: 16 BRPM | TEMPERATURE: 97.8 F

## 2018-09-30 DIAGNOSIS — I80.01 THROMBOPHLEBITIS OF SUPERFICIAL VEINS OF RIGHT LOWER EXTREMITY: Primary | ICD-10-CM

## 2018-09-30 PROCEDURE — 93971 EXTREMITY STUDY: CPT

## 2018-09-30 PROCEDURE — 93971 EXTREMITY STUDY: CPT | Performed by: SURGERY

## 2018-09-30 PROCEDURE — 99284 EMERGENCY DEPT VISIT MOD MDM: CPT

## 2018-09-30 NOTE — ED CARE HANDOFF
Emergency Department Sign Out Note        Sign out and transfer of care from Dr Yohan Molina  See Separate Emergency Department note  The patient, Power Huntley, was evaluated by the previous provider for leg pain  Workup Completed: Follow-up ultrasound    ED Course / Workup Pending (followup): Ultrasound showing superficial thrombophlebitis, down in the calf, this is palpable on examination  , patient does have a lot of pain in this area but does describe more of a generalized leg pain  , will start patient on aspirin patient is to call his vascular surgeon today or tomorrow to discuss systemic anticoagulation but as this is far away from the deep venous system I do not believe it is indicated and does not need to be emergently started at this time  Patient discharged  Procedures  MDM  Number of Diagnoses or Management Options  Thrombophlebitis of superficial veins of right lower extremity: new and requires workup     Amount and/or Complexity of Data Reviewed  Tests in the radiology section of CPT®: reviewed and ordered  Review and summarize past medical records: yes  Discuss the patient with other providers: yes      CritCare Time      Disposition  Final diagnoses:    Thrombophlebitis of superficial veins of right lower extremity     Time reflects when diagnosis was documented in both MDM as applicable and the Disposition within this note     Time User Action Codes Description Comment    9/30/2018  8:30 AM Bel Quinn Add [I80 02] Thrombophlebitis of superficial veins of left lower extremity     9/30/2018  8:31 AM Junito Bhatia Add [I80 01] Thrombophlebitis of superficial veins of right lower extremity     9/30/2018  8:31 AM Bel Quinn Modify [I80 01] Thrombophlebitis of superficial veins of right lower extremity     9/30/2018  8:31 AM Junito Bhatia Remove [I80 02] Thrombophlebitis of superficial veins of left lower extremity       ED Disposition     ED Disposition Condition Comment    Discharge  Marilin Gomez discharge to home/self care  Condition at discharge: Good        Follow-up Information     Follow up With Specialties Details Why Contact Info    Your private vascular surgeon  Call today To arrange for the next available appointment         Patient's Medications   Discharge Prescriptions    No medications on file     No discharge procedures on file         ED Provider  Electronically Signed by     Steven Sparks DO  09/30/18 2817

## 2018-09-30 NOTE — DISCHARGE INSTRUCTIONS
Superficial Thrombophlebitis   WHAT YOU NEED TO KNOW:   Superficial thrombophlebitis (STP) is inflammation of a vein just under your skin (superficial vein)  The inflammation causes a blood clot to form in your vein  STP most often happens in your leg but may also happen in your arm  DISCHARGE INSTRUCTIONS:   Call 911 for any of the following:   · You feel lightheaded, short of breath, and have chest pain  · You cough up blood  Return to the emergency department if:   · Your leg or arm turns pale or blue  · Your leg or arm feels hot or cold  · Your arm or leg feels warm, tender, and painful  It may look swollen and red  Contact your healthcare provider or hematologist if:   · Your symptoms return after treatment  · You have questions or concerns about your condition or care  Medicines: You may  need any of the following:  · Antibiotics  may be given to treat a bacterial infection  · NSAIDs , such as ibuprofen, help decrease swelling, pain, and fever  NSAIDs can cause stomach bleeding or kidney problems in certain people  If you take blood thinner medicine, always ask your healthcare provider if NSAIDs are safe for you  Always read the medicine label and follow directions  · Blood thinners    help prevent blood clots  Examples of blood thinners include heparin and warfarin  Clots can cause strokes, heart attacks, and death  The following are general safety guidelines to follow while you are taking a blood thinner:    ¨ Watch for bleeding and bruising while you take blood thinners  Watch for bleeding from your gums or nose  Watch for blood in your urine and bowel movements  Use a soft washcloth on your skin, and a soft toothbrush to brush your teeth  This can keep your skin and gums from bleeding  If you shave, use an electric shaver  Do not play contact sports  ¨ Tell your dentist and other healthcare providers that you take anticoagulants   Wear a bracelet or necklace that says you take this medicine  ¨ Do not start or stop any medicines unless your healthcare provider tells you to  Many medicines cannot be used with blood thinners  ¨ Tell your healthcare provider right away if you forget to take the medicine, or if you take too much  ¨ Warfarin  is a blood thinner that you may need to take  The following are things you should be aware of if you take warfarin  § Foods and medicines can affect the amount of warfarin in your blood  Do not make major changes to your diet while you take warfarin  Warfarin works best when you eat about the same amount of vitamin K every day  Vitamin K is found in green leafy vegetables and certain other foods  Ask for more information about what to eat when you are taking warfarin  § You will need to see your healthcare provider for follow-up visits when you are on warfarin  You will need regular blood tests  These tests are used to decide how much medicine you need  · Antiplatelets , such as aspirin, help prevent blood clots  Take your antiplatelet medicine exactly as directed  These medicines make it more likely for you to bleed or bruise  If you are told to take aspirin, do not take acetaminophen or ibuprofen instead  · Take your medicine as directed  Contact your healthcare provider if you think your medicine is not helping or if you have side effects  Tell him of her if you are allergic to any medicine  Keep a list of the medicines, vitamins, and herbs you take  Include the amounts, and when and why you take them  Bring the list or the pill bottles to follow-up visits  Carry your medicine list with you in case of an emergency  Follow up with your healthcare provider as directed:  Write down your questions so you remember to ask them during your visits  Manage your symptoms and prevent STP:  STP can increase your risk for a blood clot in deeper veins in your arms or legs  It can also increase your risk for a blood clot in your lungs   Do the following to decrease your risk for more blood clots and manage your symptoms:  · Wear pressure stockings as directed  Pressure stockings improve blood flow and help prevent clots in your legs  Wear the stockings during the day  Do not wear them when you sleep  · Elevate your leg or arm above the level of your heart as often as you can  This will help decrease swelling and pain  Prop your leg or arm on pillows or blankets to keep it elevated comfortably  · Apply a warm compress to your arm or leg  This will help decrease swelling and pain  Wet a washcloth in warm water  Do not  use hot water  Apply the warm compress for 10 minutes  Repeat this 4 times each day  · Maintain a healthy weight  This will help decrease your risk for another blood clot  Ask your healthcare provider how much you should weigh  Ask him or her to help you create a weight loss plan if you are overweight  · Do not smoke  Nicotine and other chemicals in cigarettes and cigars can damage blood vessels and increase your risk for blood clots  Ask your healthcare provider for information if you currently smoke and need help to quit  E-cigarettes or smokeless tobacco still contain nicotine  Talk to your healthcare provider before you use these products  · Stay active  Activity helps prevent blood clots  Do not sit for more than an hour  If you travel by car or work at a desk, move and stretch in your seat several times each hour  In an airplane, get up and walk every hour  Exercise your legs while you are sitting by raising and lowering your heels  Keep your toes on the floor while you do this  You can also raise and lower your toes while keeping your heels on the floor  Also tighten and release your leg muscles while you are sitting  · Exercise regularly  Exercise can help increase your blood flow and prevent a blood clot  Walking is a good low-impact exercise   Talk to your healthcare provider about the best exercise plan for you  · Do not inject illegal drugs  Talk to your healthcare provider if you use IV drugs and need help to quit  © 2017 [address] [employer] Information is for End User's use only and may not be sold, redistributed or otherwise used for commercial purposes  All illustrations and images included in CareNotes® are the copyrighted property of A D [employer] M [employer] Inc  or Jorge Mims  The above information is an  only  It is not intended as medical advice for individual conditions or treatments  Talk to your doctor, nurse or pharmacist before following any medical regimen to see if it is safe and effective for you

## 2018-09-30 NOTE — ED PROVIDER NOTES
History  Chief Complaint   Patient presents with    Leg Pain     Patient has R lower leg pain accompanied by firm lump which he states started overnight last night  Pt is s/p vascular procedure this past Tuesday for venous insufficiency, had post-op check up on Thurs and everything was [facility] then  Patient is a 52year old male who had what sounds like was a endovascular ablation performed this Tuesday by Dr Joby Alvarado with the [facility]"  Patient now notes swelling and pain in the right lower extremity  Of note, no fevers, chills, sweats  No signs of cellulitis, no redness, no crepitus  I was able to speak with our vascular surgeon, will rule out DVT, otherwise, suspect that this is inflammation and likely thrombosis of the veins that he had the procedure performed on  Impression:  RIGHT LOWER LIMB:  Superficial thrombosis is noted in the great saphenous vein in the thigh  consistent with typical findings s/p greater saphenous vein surgery for varicose  veins  It does not extend into the deep venous system  There is evidence of focal, acute, occlusive, superficial vein thrombus in a  greater saphenous vein tributary in the mid medial calf  No evidence of acute or chronic deep vein thrombosis  Doppler evaluation shows a normal response to augmentation maneuvers  Doppler waveforms are triphasic  Prior to Admission Medications   Prescriptions Last Dose Informant Patient Reported? Taking?    Cholecalciferol (VITAMIN D) 2000 units CAPS   Yes Yes   Sig: Take 1 capsule by mouth daily in the early morning   aspirin 81 MG tablet   Yes Yes   Sig: Take 81 mg by mouth every morning   ibuprofen (MOTRIN) 200 mg tablet   Yes Yes   Sig: Take by mouth every 6 (six) hours as needed for mild pain   multivitamin (THERAGRAN) TABS   Yes Yes   Sig: Take 1 tablet by mouth daily in the early morning      Facility-Administered Medications: None       Past Medical History:   Diagnosis Date    Arthritis     r foot    Infectious diarrhea     LAST ASSESSED: [id number]    Overweight     LAST ASSESSED: [id number]    Panic attacks     last one 4-5 months ago    Paresthesias/numbness     LAST ASSESSED: [id number]    Prediabetes     LAST ASSESSED: [id number]    Prehypertension     LAST ASSESSED: [id number]    Tinea versicolor     LAST ASSESSED: [id number]    Varicose vein of leg     r leg    Vitamin D deficiency     LAST ASSESSED: [id number]    Wears glasses        Past Surgical History:   Procedure Laterality Date    APPENDECTOMY      LAST ASSESSED: [id number]    CHEILECTOMY Right 12/1/2017    Procedure: CHEILECTOMY;  Surgeon: Jewels Guzman DPM;  Location: [facility];  Service: Podiatry    HIP PINNING Left     as a child    HIP SURGERY      LAST ASSESSED: [id number]       Family History   Problem Relation Age of Onset    Diabetes Mother     Hypertension Mother     Prostate cancer Father     Heart attack Father     Diabetes Paternal Aunt      I have reviewed and agree with the history as documented  Social History   Substance Use Topics    Smoking status: Never Smoker    Smokeless tobacco: Never Used    Alcohol use 7 2 oz/week     12 Cans of beer per week      Comment: (HISTORY), SOCIAL        Review of Systems   Musculoskeletal:        Pain to lower leg over superficial veins   All other systems reviewed and are negative  Physical Exam  Physical Exam   Constitutional: He is oriented to person, place, and time  He appears well-developed and well-nourished  HENT:   Head: Normocephalic and atraumatic  Eyes: Pupils are equal, round, and reactive to light  EOM are normal    Neck: Normal range of motion  Neck supple  Cardiovascular: Normal rate, regular rhythm and normal heart sounds  No murmur heard  Pulmonary/Chest: Effort normal and breath sounds normal  No respiratory distress  He has no wheezes  Abdominal: Soft  Bowel sounds are normal  He exhibits no distension   There is no tenderness  Musculoskeletal: Normal range of motion  He exhibits tenderness  He exhibits no edema  Neurological: He is alert and oriented to person, place, and time  No cranial nerve deficit  Coordination normal    Skin: Skin is warm and dry  He is not diaphoretic  No erythema  Psychiatric: He has a normal mood and affect  His behavior is normal    Nursing note and vitals reviewed  Vital Signs  ED Triage Vitals [09/30/18 0623]   Temperature Pulse Respirations Blood Pressure SpO2   97 8 °F (36 6 °C) 75 18 150/87 97 %      Temp Source Heart Rate Source Patient Position - Orthostatic VS BP Location FiO2 (%)   Oral Monitor -- Right arm --      Pain Score       4           Vitals:    09/30/18 0623 09/30/18 0630 09/30/18 0723 09/30/18 0917   BP: 150/87 150/87 143/87 164/99   Pulse: 75 74 81 83       Visual Acuity      ED Medications  Medications - No data to display    Diagnostic Studies  Results Reviewed     None                 VAS lower limb venous duplex study, unilateral/limited   Final Result by Nany Hopkins MD (09/30 1039)                 Procedures  Procedures       Phone Contacts  ED Phone Contact    ED Course  ED Course as of Sep 30 2310   Sun Sep 30, 2018   0722 If 50% or more into deep venopus give xarelto                                MDM  CritCare Time    Disposition  Final diagnoses:    Thrombophlebitis of superficial veins of right lower extremity     Time reflects when diagnosis was documented in both MDM as applicable and the Disposition within this note     Time User Action Codes Description Comment    9/30/2018  8:30 AM Jessica Pointer Add [I80 02] Thrombophlebitis of superficial veins of left lower extremity     9/30/2018  8:31 AM Junito Queen Add [I80 01] Thrombophlebitis of superficial veins of right lower extremity     9/30/2018  8:31 AM Jessica Pointer Modify [I80 01] Thrombophlebitis of superficial veins of right lower extremity     9/30/2018  8:31 AM Jessica Pointer Remove [I80 02] Thrombophlebitis of superficial veins of left lower extremity       ED Disposition     ED Disposition Condition Comment    Discharge  Sumeet Galindo discharge to home/self care  Condition at discharge: Good        Follow-up Information     Follow up With Specialties Details Why Contact Info    Your private vascular surgeon  Call today To arrange for the next available appointment           Discharge Medication List as of 9/30/2018  8:31 AM      CONTINUE these medications which have NOT CHANGED    Details   aspirin 81 MG tablet Take 81 mg by mouth every morning, Historical Med      Cholecalciferol (VITAMIN D) 2000 units CAPS Take 1 capsule by mouth daily in the early morning, Historical Med      ibuprofen (MOTRIN) 200 mg tablet Take by mouth every 6 (six) hours as needed for mild pain, Historical Med      multivitamin (THERAGRAN) TABS Take 1 tablet by mouth daily in the early morning, Historical Med           No discharge procedures on file      ED Provider  Electronically Signed by           Lucero Marin MD  09/30/18 1044

## 2018-10-05 ENCOUNTER — OFFICE VISIT (OUTPATIENT)
Dept: FAMILY MEDICINE CLINIC | Facility: CLINIC | Age: 53
End: 2018-10-05
Payer: COMMERCIAL

## 2018-10-05 VITALS
TEMPERATURE: 98.9 F | WEIGHT: 258.8 LBS | RESPIRATION RATE: 16 BRPM | HEIGHT: 70 IN | OXYGEN SATURATION: 98 % | SYSTOLIC BLOOD PRESSURE: 132 MMHG | DIASTOLIC BLOOD PRESSURE: 80 MMHG | HEART RATE: 80 BPM | BODY MASS INDEX: 37.05 KG/M2

## 2018-10-05 DIAGNOSIS — J30.1 SEASONAL ALLERGIC RHINITIS DUE TO POLLEN: ICD-10-CM

## 2018-10-05 DIAGNOSIS — J01.21 ACUTE RECURRENT ETHMOIDAL SINUSITIS: Primary | ICD-10-CM

## 2018-10-05 PROCEDURE — 99213 OFFICE O/P EST LOW 20 MIN: CPT | Performed by: FAMILY MEDICINE

## 2018-10-05 RX ORDER — CEFUROXIME AXETIL 500 MG/1
500 TABLET ORAL EVERY 12 HOURS SCHEDULED
Qty: 20 TABLET | Refills: 0 | Status: SHIPPED | OUTPATIENT
Start: 2018-10-05 | End: 2018-10-15

## 2018-10-05 NOTE — PROGRESS NOTES
Assessment/Plan:    No problem-specific Assessment & Plan notes found for this encounter  Diagnoses and all orders for this visit:    Acute recurrent ethmoidal sinusitis  Comments:  Treating at this time with Ceftin  Recommended pt uses both Flonase and OTC Allegra daily for approx the next month  Pt is to hydrate well  Orders:  -     cefuroxime (CEFTIN) 500 mg tablet; Take 1 tablet (500 mg total) by mouth every 12 (twelve) hours for 10 days    Seasonal allergic rhinitis due to pollen          Subjective:      Patient ID: Shaunna Granda is a 52 y o  male  Pt has had allergies and sinus congestion x 6 months  He is using Flonase; occasionally takes Allegra  Sinus Problem   Associated symptoms include congestion and sinus pressure  Pertinent negatives include no coughing or sneezing         The following portions of the patient's history were reviewed and updated as appropriate: allergies, current medications, past family history, past social history, past surgical history and problem list     Past Medical History:   Diagnosis Date    Arthritis     r foot    Infectious diarrhea     LAST ASSESSED: [id number]    Overweight     LAST ASSESSED: [id number]    Panic attacks     last one 4-5 months ago    Paresthesias/numbness     LAST ASSESSED: [id number]    Prediabetes     LAST ASSESSED: [id number]    Prehypertension     LAST ASSESSED: [id number]    Tinea versicolor     LAST ASSESSED: [id number]    Varicose vein of leg     r leg    Vitamin D deficiency     LAST ASSESSED: [id number]    Wears glasses        Current Outpatient Prescriptions:     aspirin 81 MG tablet, Take 81 mg by mouth every morning, Disp: , Rfl:     cefuroxime (CEFTIN) 500 mg tablet, Take 1 tablet (500 mg total) by mouth every 12 (twelve) hours for 10 days, Disp: 20 tablet, Rfl: 0    Cholecalciferol (VITAMIN D) 2000 units CAPS, Take 1 capsule by mouth daily in the early morning, Disp: , Rfl:     ibuprofen (MOTRIN) 200 mg tablet, Take by mouth every 6 (six) hours as needed for mild pain, Disp: , Rfl:     multivitamin (THERAGRAN) TABS, Take 1 tablet by mouth daily in the early morning, Disp: , Rfl:     No Known Allergies    Review of Systems   Constitutional: Negative for activity change and fever  HENT: Positive for congestion and sinus pressure  Negative for sneezing  No pressure into his teeth  Respiratory: Negative for cough  Objective:      /80 (BP Location: Left arm, Patient Position: Sitting, Cuff Size: Large)   Pulse 80   Temp 98 9 °F (37 2 °C) (Tympanic)   Resp 16   Ht 5' 10" (1 778 m)   Wt 117 kg (258 lb 12 8 oz)   SpO2 98%   BMI 37 13 kg/m²          Physical Exam   Constitutional: He is oriented to person, place, and time  He appears well-developed and well-nourished  No distress  HENT:   Head: Normocephalic and atraumatic  Right Ear: Hearing, tympanic membrane, external ear and ear canal normal    Left Ear: Hearing, tympanic membrane, external ear and ear canal normal    Nose: Mucosal edema present  Mouth/Throat: Oropharynx is clear and moist  No oropharyngeal exudate  +TTP over the ethmoidal sinuses  Eyes: Conjunctivae are normal    Neck: Normal range of motion  Neck supple  No thyromegaly present  Cardiovascular: Normal rate, regular rhythm and normal heart sounds  Exam reveals no gallop and no friction rub  No murmur heard  Pulmonary/Chest: Effort normal and breath sounds normal  No respiratory distress  He has no wheezes  He has no rales  Lymphadenopathy:     He has no cervical adenopathy  Neurological: He is alert and oriented to person, place, and time  Skin: He is not diaphoretic  Psychiatric: He has a normal mood and affect  His behavior is normal  Judgment and thought content normal    Nursing note and vitals reviewed

## 2019-02-09 ENCOUNTER — OFFICE VISIT (OUTPATIENT)
Dept: URGENT CARE | Facility: CLINIC | Age: 54
End: 2019-02-09
Payer: COMMERCIAL

## 2019-02-09 VITALS
DIASTOLIC BLOOD PRESSURE: 76 MMHG | HEIGHT: 71 IN | RESPIRATION RATE: 20 BRPM | TEMPERATURE: 98.5 F | HEART RATE: 70 BPM | BODY MASS INDEX: 34.83 KG/M2 | SYSTOLIC BLOOD PRESSURE: 120 MMHG | WEIGHT: 248.8 LBS | OXYGEN SATURATION: 98 %

## 2019-02-09 DIAGNOSIS — R68.89 FLU-LIKE SYMPTOMS: Primary | ICD-10-CM

## 2019-02-09 PROCEDURE — G0382 LEV 3 HOSP TYPE B ED VISIT: HCPCS | Performed by: NURSE PRACTITIONER

## 2019-02-09 PROCEDURE — S9083 URGENT CARE CENTER GLOBAL: HCPCS | Performed by: NURSE PRACTITIONER

## 2019-02-09 NOTE — PROGRESS NOTES
[facility]        NAME: Minda Mccormick is a 52 y o  male  : 1965    MRN: [id number]  DATE: 2019  TIME: 11:17 AM    Assessment and Plan     1  Flu-like symptoms  Suspect influenza  Beyond time frame for tamiflu  Improving  Symptomatic tx  Patient Instructions   You have been diagnosed with a flu like illness  You are beyond the timeframe for treatment with Tamiflu  Rest, fluids, tylenol/ibuprofen as needed  Symptomatic treatment  Antibiotics are not indicated at this time  Follow up with PCP in 5-7 days if symptoms worsen or persist      Chief Complaint     Chief Complaint   Patient presents with    Cold Like Symptoms     pt states hes not sure if its the flu or cold  x 4 days hes been feeling fatigued, non productive cough, body aches and fevers at home          History of Present Illness       Symptoms started 6 days ago  Symptoms started very suddenly  Initially severe fatigue then 4 days ago symptoms worsened    Nasal congestion, night sweats, achey, fever temp max 103  Fever improved now  No cough  No sore throat  Actually feels like is starting to get better  Did get flu shot  Taking theraflu  Review of Systems   Review of Systems   Constitutional: Positive for fatigue and fever  HENT: Positive for congestion and rhinorrhea  Negative for postnasal drip, sinus pain, sinus pressure and sore throat  Respiratory: Positive for cough  Negative for shortness of breath  Gastrointestinal: Negative for diarrhea, nausea and vomiting  Musculoskeletal: Positive for myalgias  Neurological: Negative for dizziness and headaches  Hematological: Negative for adenopathy           Current Medications       Current Outpatient Prescriptions:     Cholecalciferol (VITAMIN D) 2000 units CAPS, Take 1 capsule by mouth daily in the early morning, Disp: , Rfl:     multivitamin (THERAGRAN) TABS, Take 1 tablet by mouth daily in the early morning, Disp: , Rfl:     aspirin 81 MG tablet, Take 81 mg by mouth every morning, Disp: , Rfl:     ibuprofen (MOTRIN) 200 mg tablet, Take by mouth every 6 (six) hours as needed for mild pain, Disp: , Rfl:     Current Allergies     Allergies as of 02/09/2019    (No Known Allergies)            The following portions of the patient's history were reviewed and updated as appropriate: allergies, current medications, past family history, past medical history, past social history, past surgical history and problem list      Past Medical History:   Diagnosis Date    Arthritis     r foot    Infectious diarrhea     LAST ASSESSED: [id number]    Overweight     LAST ASSESSED: [id number]    Panic attacks     last one 4-5 months ago    Paresthesias/numbness     LAST ASSESSED: [id number]    Prediabetes     LAST ASSESSED: [id number]    Prehypertension     LAST ASSESSED: [id number]    Tinea versicolor     LAST ASSESSED: [id number]    Varicose vein of leg     r leg    Vitamin D deficiency     LAST ASSESSED: [id number]    Wears glasses        Past Surgical History:   Procedure Laterality Date    APPENDECTOMY      LAST ASSESSED: [id number]    CHEILECTOMY Right 12/1/2017    Procedure: CHEILECTOMY;  Surgeon: Shahida Newberry DPM;  [facility];  Service: Podiatry    HIP PINNING Left     as a child    HIP SURGERY      LAST ASSESSED: [id number]       Family History   Problem Relation Age of Onset    Diabetes Mother     Hypertension Mother     Prostate cancer Father     Heart attack Father     Diabetes Paternal Aunt          Medications have been verified  Objective   /76 (BP Location: Right arm, Patient Position: Sitting, Cuff Size: Large)   Pulse 70   Temp 98 5 °F (36 9 °C)   Resp 20   Ht 5' 11" (1 803 m)   Wt 113 kg (248 lb 12 8 oz)   SpO2 98%   BMI 34 70 kg/m²        Physical Exam     Physical Exam   Constitutional: He appears well-developed and well-nourished  No distress     HENT:   TMS WNL  Turbinates inflamed  Oropharynx with no erythema or exudate  No sinus tenderness to palpation    (+) PND     Cardiovascular: Normal rate and regular rhythm  Pulmonary/Chest: Effort normal and breath sounds normal  No respiratory distress  He has no wheezes  He has no rales  Lymphadenopathy:     He has no cervical adenopathy  Neurological: He is alert  Skin: Skin is warm and dry  Vitals reviewed

## 2019-02-09 NOTE — PATIENT INSTRUCTIONS
You have been diagnosed with a flu like illness  You are beyond the timeframe for treatment with Tamiflu  Rest, fluids, tylenol/ibuprofen as needed  Symptomatic treatment  Antibiotics are not indicated at this time     Follow up with PCP in 5-7 days if symptoms worsen or persist

## 2019-04-01 ENCOUNTER — OFFICE VISIT (OUTPATIENT)
Dept: FAMILY MEDICINE CLINIC | Facility: CLINIC | Age: 54
End: 2019-04-01
Payer: COMMERCIAL

## 2019-04-01 VITALS
WEIGHT: 237.2 LBS | OXYGEN SATURATION: 95 % | RESPIRATION RATE: 16 BRPM | BODY MASS INDEX: 33.21 KG/M2 | SYSTOLIC BLOOD PRESSURE: 116 MMHG | HEIGHT: 71 IN | DIASTOLIC BLOOD PRESSURE: 80 MMHG | TEMPERATURE: 97.5 F | HEART RATE: 57 BPM

## 2019-04-01 DIAGNOSIS — Z12.5 SCREENING FOR PROSTATE CANCER: ICD-10-CM

## 2019-04-01 DIAGNOSIS — Z13.1 SCREENING FOR DIABETES MELLITUS: ICD-10-CM

## 2019-04-01 DIAGNOSIS — R53.83 FATIGUE, UNSPECIFIED TYPE: ICD-10-CM

## 2019-04-01 DIAGNOSIS — E55.9 VITAMIN D DEFICIENCY: ICD-10-CM

## 2019-04-01 DIAGNOSIS — Z00.00 WELLNESS EXAMINATION: Primary | ICD-10-CM

## 2019-04-01 PROCEDURE — 99396 PREV VISIT EST AGE 40-64: CPT | Performed by: FAMILY MEDICINE

## 2019-04-03 ENCOUNTER — LAB (OUTPATIENT)
Dept: LAB | Facility: AMBULARY SURGERY CENTER | Age: 54
End: 2019-04-03
Payer: COMMERCIAL

## 2019-04-03 DIAGNOSIS — E55.9 VITAMIN D DEFICIENCY: ICD-10-CM

## 2019-04-03 DIAGNOSIS — R53.83 FATIGUE, UNSPECIFIED TYPE: ICD-10-CM

## 2019-04-03 DIAGNOSIS — Z13.1 SCREENING FOR DIABETES MELLITUS: ICD-10-CM

## 2019-04-03 DIAGNOSIS — Z12.5 SCREENING FOR PROSTATE CANCER: ICD-10-CM

## 2019-04-03 LAB
25(OH)D3 SERPL-MCNC: 47.4 NG/ML (ref 30–100)
ALBUMIN SERPL BCP-MCNC: 4.1 G/DL (ref 3.5–5)
ALP SERPL-CCNC: 58 U/L (ref 46–116)
ALT SERPL W P-5'-P-CCNC: 25 U/L (ref 12–78)
ANION GAP SERPL CALCULATED.3IONS-SCNC: 2 MMOL/L (ref 4–13)
AST SERPL W P-5'-P-CCNC: 20 U/L (ref 5–45)
BASOPHILS # BLD AUTO: 0.02 THOUSANDS/ΜL (ref 0–0.1)
BASOPHILS NFR BLD AUTO: 0 % (ref 0–1)
BILIRUB SERPL-MCNC: 0.79 MG/DL (ref 0.2–1)
BUN SERPL-MCNC: 19 MG/DL (ref 5–25)
CALCIUM SERPL-MCNC: 9.3 MG/DL (ref 8.3–10.1)
CHLORIDE SERPL-SCNC: 106 MMOL/L (ref 100–108)
CHOLEST SERPL-MCNC: 157 MG/DL (ref 50–200)
CO2 SERPL-SCNC: 29 MMOL/L (ref 21–32)
CREAT SERPL-MCNC: 1.03 MG/DL (ref 0.6–1.3)
EOSINOPHIL # BLD AUTO: 0.11 THOUSAND/ΜL (ref 0–0.61)
EOSINOPHIL NFR BLD AUTO: 2 % (ref 0–6)
ERYTHROCYTE [DISTWIDTH] IN BLOOD BY AUTOMATED COUNT: 13.1 % (ref 11.6–15.1)
GFR SERPL CREATININE-BSD FRML MDRD: 81 ML/MIN/1.73SQ M
GLUCOSE P FAST SERPL-MCNC: 96 MG/DL (ref 65–99)
HCT VFR BLD AUTO: 42.8 % (ref 36.5–49.3)
HDLC SERPL-MCNC: 49 MG/DL (ref 40–60)
HGB BLD-MCNC: 14.3 G/DL (ref 12–17)
IMM GRANULOCYTES # BLD AUTO: 0.01 THOUSAND/UL (ref 0–0.2)
IMM GRANULOCYTES NFR BLD AUTO: 0 % (ref 0–2)
LDLC SERPL CALC-MCNC: 96 MG/DL (ref 0–100)
LYMPHOCYTES # BLD AUTO: 1.94 THOUSANDS/ΜL (ref 0.6–4.47)
LYMPHOCYTES NFR BLD AUTO: 33 % (ref 14–44)
MCH RBC QN AUTO: 29.4 PG (ref 26.8–34.3)
MCHC RBC AUTO-ENTMCNC: 33.4 G/DL (ref 31.4–37.4)
MCV RBC AUTO: 88 FL (ref 82–98)
MONOCYTES # BLD AUTO: 0.43 THOUSAND/ΜL (ref 0.17–1.22)
MONOCYTES NFR BLD AUTO: 7 % (ref 4–12)
NEUTROPHILS # BLD AUTO: 3.4 THOUSANDS/ΜL (ref 1.85–7.62)
NEUTS SEG NFR BLD AUTO: 58 % (ref 43–75)
NRBC BLD AUTO-RTO: 0 /100 WBCS
PLATELET # BLD AUTO: 248 THOUSANDS/UL (ref 149–390)
PMV BLD AUTO: 10.5 FL (ref 8.9–12.7)
POTASSIUM SERPL-SCNC: 4.2 MMOL/L (ref 3.5–5.3)
PROT SERPL-MCNC: 7.4 G/DL (ref 6.4–8.2)
PSA SERPL-MCNC: 1.7 NG/ML (ref 0–4)
RBC # BLD AUTO: 4.86 MILLION/UL (ref 3.88–5.62)
SODIUM SERPL-SCNC: 137 MMOL/L (ref 136–145)
T4 FREE SERPL-MCNC: 0.93 NG/DL (ref 0.76–1.46)
TRIGL SERPL-MCNC: 58 MG/DL
TSH SERPL DL<=0.05 MIU/L-ACNC: 7.54 UIU/ML (ref 0.36–3.74)
WBC # BLD AUTO: 5.91 THOUSAND/UL (ref 4.31–10.16)

## 2019-04-03 PROCEDURE — 36415 COLL VENOUS BLD VENIPUNCTURE: CPT

## 2019-04-03 PROCEDURE — 82306 VITAMIN D 25 HYDROXY: CPT

## 2019-04-03 PROCEDURE — 85025 COMPLETE CBC W/AUTO DIFF WBC: CPT

## 2019-04-03 PROCEDURE — 80053 COMPREHEN METABOLIC PANEL: CPT

## 2019-04-03 PROCEDURE — 80061 LIPID PANEL: CPT

## 2019-04-03 PROCEDURE — 84443 ASSAY THYROID STIM HORMONE: CPT

## 2019-04-03 PROCEDURE — G0103 PSA SCREENING: HCPCS

## 2019-04-03 PROCEDURE — 84439 ASSAY OF FREE THYROXINE: CPT

## 2019-04-08 ENCOUNTER — TELEPHONE (OUTPATIENT)
Dept: FAMILY MEDICINE CLINIC | Facility: CLINIC | Age: 54
End: 2019-04-08

## 2019-05-06 ENCOUNTER — TRANSCRIBE ORDERS (OUTPATIENT)
Dept: ADMINISTRATIVE | Facility: HOSPITAL | Age: 54
End: 2019-05-06

## 2019-05-06 DIAGNOSIS — I83.891 SYMPTOMATIC VARICOSE VEINS OF RIGHT LOWER EXTREMITY: Primary | ICD-10-CM

## 2019-05-22 ENCOUNTER — HOSPITAL ENCOUNTER (OUTPATIENT)
Dept: NON INVASIVE DIAGNOSTICS | Facility: CLINIC | Age: 54
Discharge: HOME | End: 2019-05-22
Payer: COMMERCIAL

## 2019-05-22 DIAGNOSIS — I83.891 SYMPTOMATIC VARICOSE VEINS OF RIGHT LOWER EXTREMITY: ICD-10-CM

## 2019-05-22 PROCEDURE — 93971 EXTREMITY STUDY: CPT | Performed by: SURGERY

## 2019-05-22 PROCEDURE — 93971 EXTREMITY STUDY: CPT

## 2019-08-08 DIAGNOSIS — Z12.11 SCREENING FOR COLON CANCER: Primary | ICD-10-CM

## 2020-03-24 ENCOUNTER — TELEPHONE (OUTPATIENT)
Dept: ADMINISTRATIVE | Facility: OTHER | Age: 55
End: 2020-03-24

## 2020-03-24 NOTE — TELEPHONE ENCOUNTER
----- Message from Ginger Ortiz sent at 3/24/2020 10:17 AM EDT -----  Regarding: colonoscopy  Contact: [phone]  03/24/20 10:22 AM    Hello, our patient Eulalio Estrada has had Colonoscopy  completed/performed  Please assist in updating the patient chart by linking the procedure note scanned under media  The date of service is 2/15/2015        Thank you,  Nanda UMAÑA

## 2020-03-24 NOTE — TELEPHONE ENCOUNTER
Upon review of the In Basket request we were able to locate, review, and update the patient chart as requested for CRC: Colonoscopy  Any additional questions or concerns should be emailed to the Practice Liaisons via [email or web address] email, please do not reply via In Basket      Thank you  Mayito Riojas

## 2020-05-07 ENCOUNTER — OFFICE VISIT (OUTPATIENT)
Dept: FAMILY MEDICINE CLINIC | Facility: CLINIC | Age: 55
End: 2020-05-07
Payer: COMMERCIAL

## 2020-05-07 VITALS
WEIGHT: 231.4 LBS | HEART RATE: 72 BPM | OXYGEN SATURATION: 97 % | RESPIRATION RATE: 16 BRPM | SYSTOLIC BLOOD PRESSURE: 122 MMHG | HEIGHT: 70 IN | DIASTOLIC BLOOD PRESSURE: 64 MMHG | TEMPERATURE: 97.6 F | BODY MASS INDEX: 33.13 KG/M2

## 2020-05-07 DIAGNOSIS — R53.83 OTHER FATIGUE: ICD-10-CM

## 2020-05-07 DIAGNOSIS — E55.9 VITAMIN D DEFICIENCY: ICD-10-CM

## 2020-05-07 DIAGNOSIS — Z12.5 SCREENING FOR PROSTATE CANCER: ICD-10-CM

## 2020-05-07 DIAGNOSIS — Z13.1 SCREENING FOR DIABETES MELLITUS: ICD-10-CM

## 2020-05-07 DIAGNOSIS — S16.1XXA STRAIN OF NECK MUSCLE, INITIAL ENCOUNTER: ICD-10-CM

## 2020-05-07 DIAGNOSIS — Z13.6 SCREENING FOR CARDIOVASCULAR CONDITION: ICD-10-CM

## 2020-05-07 DIAGNOSIS — Z00.00 ANNUAL PHYSICAL EXAM: Primary | ICD-10-CM

## 2020-05-07 PROCEDURE — 3008F BODY MASS INDEX DOCD: CPT | Performed by: FAMILY MEDICINE

## 2020-05-07 PROCEDURE — 99396 PREV VISIT EST AGE 40-64: CPT | Performed by: FAMILY MEDICINE

## 2020-05-07 RX ORDER — FLUTICASONE PROPIONATE 50 MCG
1 SPRAY, SUSPENSION (ML) NASAL DAILY
COMMUNITY

## 2020-05-14 ENCOUNTER — LAB (OUTPATIENT)
Dept: LAB | Facility: CLINIC | Age: 55
End: 2020-05-14
Payer: COMMERCIAL

## 2020-05-14 DIAGNOSIS — Z13.1 SCREENING FOR DIABETES MELLITUS: ICD-10-CM

## 2020-05-14 DIAGNOSIS — Z13.6 SCREENING FOR CARDIOVASCULAR CONDITION: ICD-10-CM

## 2020-05-14 DIAGNOSIS — R53.83 OTHER FATIGUE: ICD-10-CM

## 2020-05-14 DIAGNOSIS — Z12.5 SCREENING FOR PROSTATE CANCER: ICD-10-CM

## 2020-05-14 DIAGNOSIS — E55.9 VITAMIN D DEFICIENCY: ICD-10-CM

## 2020-05-14 LAB
25(OH)D3 SERPL-MCNC: 32.5 NG/ML (ref 30–100)
ALBUMIN SERPL BCP-MCNC: 4.3 G/DL (ref 3.5–5)
ALP SERPL-CCNC: 55 U/L (ref 46–116)
ALT SERPL W P-5'-P-CCNC: 34 U/L (ref 12–78)
ANION GAP SERPL CALCULATED.3IONS-SCNC: 4 MMOL/L (ref 4–13)
AST SERPL W P-5'-P-CCNC: 23 U/L (ref 5–45)
BASOPHILS # BLD AUTO: 0.03 THOUSANDS/ΜL (ref 0–0.1)
BASOPHILS NFR BLD AUTO: 1 % (ref 0–1)
BILIRUB SERPL-MCNC: 0.65 MG/DL (ref 0.2–1)
BUN SERPL-MCNC: 17 MG/DL (ref 5–25)
CALCIUM SERPL-MCNC: 9.5 MG/DL (ref 8.3–10.1)
CHLORIDE SERPL-SCNC: 105 MMOL/L (ref 100–108)
CHOLEST SERPL-MCNC: 172 MG/DL (ref 50–200)
CO2 SERPL-SCNC: 29 MMOL/L (ref 21–32)
CREAT SERPL-MCNC: 0.92 MG/DL (ref 0.6–1.3)
EOSINOPHIL # BLD AUTO: 0.04 THOUSAND/ΜL (ref 0–0.61)
EOSINOPHIL NFR BLD AUTO: 1 % (ref 0–6)
ERYTHROCYTE [DISTWIDTH] IN BLOOD BY AUTOMATED COUNT: 12.6 % (ref 11.6–15.1)
GFR SERPL CREATININE-BSD FRML MDRD: 93 ML/MIN/1.73SQ M
GLUCOSE P FAST SERPL-MCNC: 91 MG/DL (ref 65–99)
HCT VFR BLD AUTO: 43.1 % (ref 36.5–49.3)
HDLC SERPL-MCNC: 51 MG/DL
HGB BLD-MCNC: 14.4 G/DL (ref 12–17)
IMM GRANULOCYTES # BLD AUTO: 0.01 THOUSAND/UL (ref 0–0.2)
IMM GRANULOCYTES NFR BLD AUTO: 0 % (ref 0–2)
LDLC SERPL CALC-MCNC: 107 MG/DL (ref 0–100)
LYMPHOCYTES # BLD AUTO: 2.47 THOUSANDS/ΜL (ref 0.6–4.47)
LYMPHOCYTES NFR BLD AUTO: 39 % (ref 14–44)
MCH RBC QN AUTO: 29.5 PG (ref 26.8–34.3)
MCHC RBC AUTO-ENTMCNC: 33.4 G/DL (ref 31.4–37.4)
MCV RBC AUTO: 88 FL (ref 82–98)
MONOCYTES # BLD AUTO: 0.43 THOUSAND/ΜL (ref 0.17–1.22)
MONOCYTES NFR BLD AUTO: 7 % (ref 4–12)
NEUTROPHILS # BLD AUTO: 3.43 THOUSANDS/ΜL (ref 1.85–7.62)
NEUTS SEG NFR BLD AUTO: 52 % (ref 43–75)
NRBC BLD AUTO-RTO: 0 /100 WBCS
PLATELET # BLD AUTO: 226 THOUSANDS/UL (ref 149–390)
PMV BLD AUTO: 10.6 FL (ref 8.9–12.7)
POTASSIUM SERPL-SCNC: 4 MMOL/L (ref 3.5–5.3)
PROT SERPL-MCNC: 7.5 G/DL (ref 6.4–8.2)
PSA SERPL-MCNC: 1.6 NG/ML (ref 0–4)
RBC # BLD AUTO: 4.88 MILLION/UL (ref 3.88–5.62)
SODIUM SERPL-SCNC: 138 MMOL/L (ref 136–145)
T4 FREE SERPL-MCNC: 0.91 NG/DL (ref 0.76–1.46)
TRIGL SERPL-MCNC: 71 MG/DL
TSH SERPL DL<=0.05 MIU/L-ACNC: 4.31 UIU/ML (ref 0.36–3.74)
WBC # BLD AUTO: 6.41 THOUSAND/UL (ref 4.31–10.16)

## 2020-05-14 PROCEDURE — 82306 VITAMIN D 25 HYDROXY: CPT

## 2020-05-14 PROCEDURE — 84443 ASSAY THYROID STIM HORMONE: CPT

## 2020-05-14 PROCEDURE — 80053 COMPREHEN METABOLIC PANEL: CPT

## 2020-05-14 PROCEDURE — 80061 LIPID PANEL: CPT

## 2020-05-14 PROCEDURE — G0103 PSA SCREENING: HCPCS

## 2020-05-14 PROCEDURE — 36415 COLL VENOUS BLD VENIPUNCTURE: CPT

## 2020-05-14 PROCEDURE — 84439 ASSAY OF FREE THYROXINE: CPT

## 2020-05-14 PROCEDURE — 85025 COMPLETE CBC W/AUTO DIFF WBC: CPT

## 2020-12-17 ENCOUNTER — OFFICE VISIT (OUTPATIENT)
Dept: PODIATRY | Facility: CLINIC | Age: 55
End: 2020-12-17
Payer: COMMERCIAL

## 2020-12-17 VITALS — BODY MASS INDEX: 32.93 KG/M2 | HEIGHT: 70 IN | WEIGHT: 230 LBS

## 2020-12-17 DIAGNOSIS — M79.672 LEFT FOOT PAIN: ICD-10-CM

## 2020-12-17 DIAGNOSIS — M20.21 HALLUX RIGIDUS OF RIGHT FOOT: ICD-10-CM

## 2020-12-17 DIAGNOSIS — M20.12 HALLUX VALGUS OF LEFT FOOT: Primary | ICD-10-CM

## 2020-12-17 PROCEDURE — 99243 OFF/OP CNSLTJ NEW/EST LOW 30: CPT | Performed by: PODIATRIST

## 2020-12-17 PROCEDURE — 3008F BODY MASS INDEX DOCD: CPT | Performed by: PODIATRIST

## 2020-12-17 PROCEDURE — 1036F TOBACCO NON-USER: CPT | Performed by: PODIATRIST

## 2021-03-10 DIAGNOSIS — Z23 ENCOUNTER FOR IMMUNIZATION: ICD-10-CM

## 2021-06-07 ENCOUNTER — OFFICE VISIT (OUTPATIENT)
Dept: FAMILY MEDICINE CLINIC | Facility: CLINIC | Age: 56
End: 2021-06-07
Payer: COMMERCIAL

## 2021-06-07 VITALS
DIASTOLIC BLOOD PRESSURE: 72 MMHG | HEART RATE: 63 BPM | WEIGHT: 238.8 LBS | RESPIRATION RATE: 18 BRPM | HEIGHT: 70 IN | TEMPERATURE: 97.4 F | BODY MASS INDEX: 34.19 KG/M2 | OXYGEN SATURATION: 96 % | SYSTOLIC BLOOD PRESSURE: 118 MMHG

## 2021-06-07 DIAGNOSIS — R73.02 IGT (IMPAIRED GLUCOSE TOLERANCE): ICD-10-CM

## 2021-06-07 DIAGNOSIS — R53.83 FATIGUE, UNSPECIFIED TYPE: ICD-10-CM

## 2021-06-07 DIAGNOSIS — M70.62 TROCHANTERIC BURSITIS OF BOTH HIPS: ICD-10-CM

## 2021-06-07 DIAGNOSIS — Z12.5 SCREENING FOR PROSTATE CANCER: ICD-10-CM

## 2021-06-07 DIAGNOSIS — Z13.1 SCREENING FOR DIABETES MELLITUS: ICD-10-CM

## 2021-06-07 DIAGNOSIS — M70.61 TROCHANTERIC BURSITIS OF BOTH HIPS: ICD-10-CM

## 2021-06-07 DIAGNOSIS — E55.9 VITAMIN D DEFICIENCY: ICD-10-CM

## 2021-06-07 DIAGNOSIS — Z00.00 ANNUAL PHYSICAL EXAM: Primary | ICD-10-CM

## 2021-06-07 DIAGNOSIS — Z13.6 SCREENING FOR CARDIOVASCULAR CONDITION: ICD-10-CM

## 2021-06-07 DIAGNOSIS — M54.2 NECK PAIN ON RIGHT SIDE: ICD-10-CM

## 2021-06-07 PROCEDURE — 3725F SCREEN DEPRESSION PERFORMED: CPT | Performed by: FAMILY MEDICINE

## 2021-06-07 PROCEDURE — 99396 PREV VISIT EST AGE 40-64: CPT | Performed by: FAMILY MEDICINE

## 2021-06-07 RX ORDER — TURMERIC 400 MG
CAPSULE ORAL AS NEEDED
COMMUNITY
End: 2022-07-30

## 2021-06-07 NOTE — PROGRESS NOTES
[address] [facility] PRACTICE    NAME: Susu Jeff  AGE: 60 y o  SEX: male  : 1965     DATE: 2021     Assessment and Plan:     Problem List Items Addressed This Visit     None      Visit Diagnoses     Annual physical exam    -  Primary    Shaji Shell is stable on exam   He is to get back to a lower carb diet, and continue regular exercise  FBW ordered  Screening for prostate cancer        PSA incl with FBW ordered  Relevant Orders    PSA, Total Screen    Screening for diabetes mellitus        Screening for cardiovascular condition        Relevant Orders    Lipid Panel with Direct LDL reflex    Fatigue, unspecified type        Relevant Orders    CBC and differential    TSH, 3rd generation with Free T4 reflex    IGT (impaired glucose tolerance)        Hx of - A1c incl with FBW ordered  Relevant Orders    Comprehensive metabolic panel    HEMOGLOBIN A1C W/ EAG ESTIMATION    Vitamin D deficiency        Hx of; on OTC Vit D3 supplement - checking Vit D level  Neck pain on right side        Mild and intermittent - checking xrays at this time, and pt referred to PT  He does not have the pain at this time  Relevant Orders    XR spine cervical 2 or 3 vw injury    Ambulatory referral to Physical Therapy    Trochanteric bursitis of both hips        Likely from hx and exam - possibly came on with exercise  Pt to use heat to the regions and stretch  Referral placed for PT  Relevant Orders    Ambulatory referral to Physical Therapy          Immunizations and preventive care screenings were discussed with patient today  Appropriate education was printed on patient's after visit summary  Counseling:  Dental Health: discussed importance of regular tooth brushing, flossing, and dental visits  · Exercise: the importance of regular exercise/physical activity was discussed  Recommend exercise 3-5 times per week for at least 30 minutes       BMI Counseling: Body mass index is 34 07 kg/m²  The BMI is above normal  Nutrition recommendations include moderation in carbohydrate intake  Exercise recommendations include exercising 3-5 times per week  No pharmacotherapy was ordered  Patient referred to PCP due to patient being overweight  Return in 1 year (on 6/7/2022) for Annual physical      Chief Complaint:     Chief Complaint   Patient presents with    Annual Exam      History of Present Illness:     Adult Annual Physical   Patient here for a comprehensive physical exam  The patient reports no problems  Serge Mayorga colonoscopy is UTD - had in 2015 with [facility]; due again in 2025  Seeing Dermatology  Received the [employer] [address] vaccine in 03/2021  He is exercising regularly, and getting back to a healthier diet  Diet and Physical Activity  · Diet/Nutrition: well balanced diet  · Exercise: 3-4 times a week on average  Depression Screening  PHQ-9 Depression Screening    PHQ-9:   Frequency of the following problems over the past two weeks:      Little interest or pleasure in doing things: 0 - not at all  Feeling down, depressed, or hopeless: 0 - not at all  PHQ-2 Score: 0       General Health  · Sleep: sleeps well  · Hearing: normal - bilateral   · Vision: no vision problems  · Dental: regular dental visits   Health  · Symptoms include: none     Review of Systems:     Review of Systems   Constitutional: Negative for activity change  Respiratory: Negative for shortness of breath  Cardiovascular: Negative for chest pain  Gastrointestinal: Negative for abdominal pain and blood in stool  Genitourinary: Negative for decreased urine volume and difficulty urinating  1 - 2 voids per night; drinks water at night  Musculoskeletal: Positive for arthralgias and neck pain  Negative for back pain  Occasional right neck pain (does not have right now; has had intermittently for about a year), and bilateral hips  Psychiatric/Behavioral: Negative for dysphoric mood  The patient is not nervous/anxious  Past Medical History:     Past Medical History:   Diagnosis Date    Arthritis     r foot    Infectious diarrhea     LAST ASSESSED: [id number]    Overweight     LAST ASSESSED: [id number]    Panic attacks     last one 4-5 months ago    Paresthesias/numbness     LAST ASSESSED: [id number]    Prediabetes     LAST ASSESSED: [id number]    Prehypertension     LAST ASSESSED: [id number]    Tinea versicolor     LAST ASSESSED: [id number]    Varicose vein of leg     r leg    Vitamin D deficiency     LAST ASSESSED: [id number]    Wears glasses       Past Surgical History:     Past Surgical History:   Procedure Laterality Date    APPENDECTOMY      LAST ASSESSED: [id number]    CHEILECTOMY Right 12/1/2017    Procedure: CHEILECTOMY;  Surgeon: Reji Jean DPM;  Location: [facility];  Service: Podiatry    HIP PINNING Left     as a child    HIP SURGERY      LAST ASSESSED: [id number]      Family History:     Family History   Problem Relation Age of Onset    Diabetes Mother     Hypertension Mother     Prostate cancer Father     Heart attack Father     Diabetes Paternal Aunt       Social History:        Social History     Socioeconomic History    Marital status: /Civil Union     Spouse name: None    Number of children: None    Years of education: None    Highest education level: None   Occupational History    None   Social Needs    Financial resource strain: None    Food insecurity     Worry: None     Inability: None    Transportation needs     Medical: None     Non-medical: None   Tobacco Use    Smoking status: Never Smoker    Smokeless tobacco: Never Used   Substance and Sexual Activity    Alcohol use:  Yes     Alcohol/week: 12 0 standard drinks     Types: 12 Cans of beer per week     Comment: (HISTORY), SOCIAL    Drug use: No    Sexual activity: None   Lifestyle    Physical activity     Days per week: None Minutes per session: None    Stress: None   Relationships    Social connections     Talks on phone: None     Gets together: None     Attends Restoration service: None     Active member of club or organization: None     Attends meetings of clubs or organizations: None     Relationship status: None    Intimate partner violence     Fear of current or ex partner: None     Emotionally abused: None     Physically abused: None     Forced sexual activity: None   Other Topics Concern    None   Social History Narrative    None      Current Medications:     Current Outpatient Medications   Medication Sig Dispense Refill    Cholecalciferol (VITAMIN D) 2000 units CAPS Take 1 capsule by mouth daily in the early morning      Fexofenadine HCl (ALLEGRA ALLERGY PO) Take by mouth      fluticasone (FLONASE) 50 mcg/act nasal spray 1 spray into each nostril daily      Turmeric 400 MG CAPS Take by mouth as needed      multivitamin (THERAGRAN) TABS Take 1 tablet by mouth daily in the early morning       No current facility-administered medications for this visit  Allergies: Allergies   Allergen Reactions    Seasonal Ic [Cholestatin]       Physical Exam:     /72 (BP Location: Left arm, Patient Position: Sitting, Cuff Size: Large)   Pulse 63   Temp (!) 97 4 °F (36 3 °C) (Tympanic)   Resp 18   Ht 5' 10 2" (1 783 m)   Wt 108 kg (238 lb 12 8 oz)   SpO2 96%   BMI 34 07 kg/m²     Physical Exam  Vitals signs and nursing note reviewed  Constitutional:       General: He is not in acute distress  Appearance: Normal appearance  He is not ill-appearing, toxic-appearing or diaphoretic  HENT:      Head: Normocephalic and atraumatic  Eyes:      General: No scleral icterus  Conjunctiva/sclera: Conjunctivae normal    Neck:      Musculoskeletal: Normal range of motion and neck supple  No neck rigidity or muscular tenderness  Comments: Cervical spine benign on exam today    Cardiovascular:      Rate and Rhythm: Normal rate and regular rhythm  Heart sounds: Normal heart sounds  No murmur  No friction rub  No gallop  Pulmonary:      Effort: Pulmonary effort is normal  No respiratory distress  Breath sounds: Normal breath sounds  No stridor  No wheezing, rhonchi or rales  Abdominal:      General: There is no distension  Palpations: There is no mass  Tenderness: There is no abdominal tenderness  There is no guarding or rebound  Genitourinary:     Prostate: Normal       Rectum: Normal       Comments: No gross blood on the examining finger  Musculoskeletal:        Back:    Lymphadenopathy:      Cervical: No cervical adenopathy  Neurological:      Mental Status: He is alert and oriented to person, place, and time  Psychiatric:         Mood and Affect: Mood normal          Behavior: Behavior normal          Thought Content: Thought content normal          Judgment: Judgment normal       Danika Kay was seen today for annual exam     Diagnoses and all orders for this visit:    Annual physical exam  Comments:  Jayce Handley is stable on exam   He is to get back to a lower carb diet, and continue regular exercise  FBW ordered  Screening for prostate cancer  Comments:  PSA incl with FBW ordered  Orders:  -     PSA, Total Screen; Future    Screening for diabetes mellitus    Screening for cardiovascular condition  -     Lipid Panel with Direct LDL reflex; Future    Fatigue, unspecified type  -     CBC and differential; Future  -     TSH, 3rd generation with Free T4 reflex; Future    IGT (impaired glucose tolerance)  Comments:  Hx of - A1c incl with FBW ordered  Orders:  -     Comprehensive metabolic panel; Future  -     HEMOGLOBIN A1C W/ EAG ESTIMATION; Future    Vitamin D deficiency  Comments:  Hx of; on OTC Vit D3 supplement - checking Vit D level  Neck pain on right side  Comments:  Mild and intermittent - checking xrays at this time, and pt referred to PT    He does not have the pain at this time   Orders:  -     XR spine cervical 2 or 3 vw injury; Future  -     Ambulatory referral to Physical Therapy; Future    Trochanteric bursitis of both hips  Comments:  Likely from hx and exam - possibly came on with exercise  Pt to use heat to the regions and stretch  Referral placed for PT  Orders:  -     Ambulatory referral to Physical Therapy;  Future          [address],   [address] [facility]

## 2021-06-07 NOTE — PATIENT INSTRUCTIONS

## 2021-06-11 DIAGNOSIS — Z00.00 ENCOUNTER FOR PREVENTIVE HEALTH EXAMINATION: ICD-10-CM

## 2021-06-16 ENCOUNTER — OFFICE VISIT (OUTPATIENT)
Dept: PODIATRY | Facility: CLINIC | Age: 56
End: 2021-06-16
Payer: COMMERCIAL

## 2021-06-16 VITALS
SYSTOLIC BLOOD PRESSURE: 128 MMHG | HEIGHT: 70 IN | DIASTOLIC BLOOD PRESSURE: 78 MMHG | BODY MASS INDEX: 34.07 KG/M2 | WEIGHT: 238 LBS

## 2021-06-16 DIAGNOSIS — L85.2 KERATODERMA PUNCTATA: Primary | ICD-10-CM

## 2021-06-16 PROCEDURE — 1036F TOBACCO NON-USER: CPT | Performed by: PODIATRIST

## 2021-06-16 PROCEDURE — 99212 OFFICE O/P EST SF 10 MIN: CPT | Performed by: PODIATRIST

## 2021-06-16 PROCEDURE — 3008F BODY MASS INDEX DOCD: CPT | Performed by: PODIATRIST

## 2021-06-16 NOTE — PROGRESS NOTES
Patient presents with a painful punctate hyperkeratotic lesion plantar lateral aspect left foot  Patient stepped on a foreign body approximately 2 months ago leading to the lesion  He states that he recall stepping on a foreign body but that he had no bleeding and no need to remove it  There has been no drainage or redness in the area  On exam, hyperkeratotic lesion is noted plantar lateral aspect left foot  Upon trimming the lesion a porokeratosis is noted  Advised patient utilize liquid salicylic acid daily  He will be reassessed in 4 weeks

## 2021-06-24 ENCOUNTER — DOCUMENTATION (OUTPATIENT)
Dept: CARDIOLOGY CLINIC | Facility: CLINIC | Age: 56
End: 2021-06-24

## 2021-06-24 ENCOUNTER — HOSPITAL ENCOUNTER (OUTPATIENT)
Dept: CT IMAGING | Facility: HOSPITAL | Age: 56
Discharge: HOME | End: 2021-06-24

## 2021-06-24 ENCOUNTER — HOSPITAL ENCOUNTER (OUTPATIENT)
Dept: ULTRASOUND IMAGING | Facility: HOSPITAL | Age: 56
Discharge: HOME | End: 2021-06-24

## 2021-06-24 ENCOUNTER — OFFICE VISIT (OUTPATIENT)
Dept: FAMILY MEDICINE CLINIC | Facility: CLINIC | Age: 56
End: 2021-06-24

## 2021-06-24 ENCOUNTER — HOSPITAL ENCOUNTER (OUTPATIENT)
Dept: NON INVASIVE DIAGNOSTICS | Facility: CLINIC | Age: 56
Discharge: HOME | End: 2021-06-24

## 2021-06-24 ENCOUNTER — APPOINTMENT (OUTPATIENT)
Dept: LAB | Facility: CLINIC | Age: 56
End: 2021-06-24

## 2021-06-24 ENCOUNTER — HOSPITAL ENCOUNTER (OUTPATIENT)
Dept: VASCULAR ULTRASOUND | Facility: HOSPITAL | Age: 56
Discharge: HOME | End: 2021-06-24

## 2021-06-24 VITALS
OXYGEN SATURATION: 98 % | SYSTOLIC BLOOD PRESSURE: 122 MMHG | TEMPERATURE: 98.4 F | RESPIRATION RATE: 12 BRPM | HEIGHT: 70 IN | DIASTOLIC BLOOD PRESSURE: 80 MMHG | HEART RATE: 62 BPM | WEIGHT: 243 LBS | BODY MASS INDEX: 34.79 KG/M2

## 2021-06-24 DIAGNOSIS — Z00.00 ENCOUNTER FOR PREVENTIVE HEALTH EXAMINATION: ICD-10-CM

## 2021-06-24 DIAGNOSIS — R73.01 IMPAIRED FASTING GLUCOSE: ICD-10-CM

## 2021-06-24 DIAGNOSIS — I47.1 SVT (SUPRAVENTRICULAR TACHYCARDIA) (HCC): ICD-10-CM

## 2021-06-24 DIAGNOSIS — E03.9 HYPOTHYROIDISM, UNSPECIFIED TYPE: Primary | ICD-10-CM

## 2021-06-24 PROBLEM — E55.9 VITAMIN D DEFICIENCY: Status: ACTIVE | Noted: 2017-03-24

## 2021-06-24 PROBLEM — M25.569 KNEE PAIN: Status: ACTIVE | Noted: 2021-06-24

## 2021-06-24 LAB
25(OH)D3 SERPL-MCNC: 37.4 NG/ML (ref 30–100)
ALBUMIN SERPL BCP-MCNC: 4.1 G/DL (ref 3.5–5)
ALP SERPL-CCNC: 63 U/L (ref 46–116)
ALT SERPL W P-5'-P-CCNC: 29 U/L (ref 12–78)
ANION GAP SERPL CALCULATED.3IONS-SCNC: 6 MMOL/L (ref 4–13)
AST SERPL W P-5'-P-CCNC: 23 U/L (ref 5–45)
ATRIAL RATE: 67 BPM
BACTERIA UR QL AUTO: NORMAL /HPF
BASOPHILS # BLD AUTO: 0.02 THOUSANDS/ΜL (ref 0–0.1)
BASOPHILS NFR BLD AUTO: 0 % (ref 0–1)
BILIRUB SERPL-MCNC: 0.82 MG/DL (ref 0.2–1)
BILIRUB UR QL STRIP: NEGATIVE
BUN SERPL-MCNC: 18 MG/DL (ref 5–25)
CALCIUM SERPL-MCNC: 9.1 MG/DL (ref 8.3–10.1)
CHLORIDE SERPL-SCNC: 103 MMOL/L (ref 100–108)
CHOLEST SERPL-MCNC: 187 MG/DL (ref 50–200)
CLARITY UR: CLEAR
CO2 SERPL-SCNC: 29 MMOL/L (ref 21–32)
COLOR UR: NORMAL
CREAT SERPL-MCNC: 0.84 MG/DL (ref 0.6–1.3)
CRP SERPL HS-MCNC: 1.6 MG/L
EOSINOPHIL # BLD AUTO: 0.11 THOUSAND/ΜL (ref 0–0.61)
EOSINOPHIL NFR BLD AUTO: 2 % (ref 0–6)
ERYTHROCYTE [DISTWIDTH] IN BLOOD BY AUTOMATED COUNT: 12.7 % (ref 11.6–15.1)
EST. AVERAGE GLUCOSE BLD GHB EST-MCNC: 117 MG/DL
GFR SERPL CREATININE-BSD FRML MDRD: 97 ML/MIN/1.73SQ M
GLUCOSE P FAST SERPL-MCNC: 114 MG/DL (ref 65–99)
GLUCOSE UR STRIP-MCNC: NEGATIVE MG/DL
HBA1C MFR BLD: 5.7 %
HCT VFR BLD AUTO: 44.4 % (ref 36.5–49.3)
HCV AB SER QL: NORMAL
HDLC SERPL-MCNC: 55 MG/DL
HGB BLD-MCNC: 15.3 G/DL (ref 12–17)
HGB UR QL STRIP.AUTO: NEGATIVE
IMM GRANULOCYTES # BLD AUTO: 0.03 THOUSAND/UL (ref 0–0.2)
IMM GRANULOCYTES NFR BLD AUTO: 1 % (ref 0–2)
KETONES UR STRIP-MCNC: NEGATIVE MG/DL
LDLC SERPL CALC-MCNC: 121 MG/DL (ref 0–100)
LEUKOCYTE ESTERASE UR QL STRIP: NEGATIVE
LYMPHOCYTES # BLD AUTO: 2.14 THOUSANDS/ΜL (ref 0.6–4.47)
LYMPHOCYTES NFR BLD AUTO: 33 % (ref 14–44)
MCH RBC QN AUTO: 29.8 PG (ref 26.8–34.3)
MCHC RBC AUTO-ENTMCNC: 34.5 G/DL (ref 31.4–37.4)
MCV RBC AUTO: 86 FL (ref 82–98)
MONOCYTES # BLD AUTO: 0.54 THOUSAND/ΜL (ref 0.17–1.22)
MONOCYTES NFR BLD AUTO: 8 % (ref 4–12)
NEUTROPHILS # BLD AUTO: 3.75 THOUSANDS/ΜL (ref 1.85–7.62)
NEUTS SEG NFR BLD AUTO: 56 % (ref 43–75)
NITRITE UR QL STRIP: NEGATIVE
NON-SQ EPI CELLS URNS QL MICRO: NORMAL /HPF
NRBC BLD AUTO-RTO: 0 /100 WBCS
P AXIS: 28 DEGREES
PH UR STRIP.AUTO: 6 [PH]
PLATELET # BLD AUTO: 259 THOUSANDS/UL (ref 149–390)
PMV BLD AUTO: 10.2 FL (ref 8.9–12.7)
POTASSIUM SERPL-SCNC: 4.1 MMOL/L (ref 3.5–5.3)
PR INTERVAL: 188 MS
PROT SERPL-MCNC: 7.3 G/DL (ref 6.4–8.2)
PROT UR STRIP-MCNC: NEGATIVE MG/DL
PSA SERPL-MCNC: 2 NG/ML (ref 0–4)
QRS AXIS: 22 DEGREES
QRSD INTERVAL: 98 MS
QT INTERVAL: 404 MS
QTC INTERVAL: 426 MS
RBC # BLD AUTO: 5.14 MILLION/UL (ref 3.88–5.62)
RBC #/AREA URNS AUTO: NORMAL /HPF
SODIUM SERPL-SCNC: 138 MMOL/L (ref 136–145)
SP GR UR STRIP.AUTO: <=1.005 (ref 1–1.03)
T WAVE AXIS: -3 DEGREES
T4 FREE SERPL-MCNC: 0.86 NG/DL (ref 0.76–1.46)
TRIGL SERPL-MCNC: 54 MG/DL
TSH SERPL DL<=0.05 MIU/L-ACNC: 4.46 UIU/ML (ref 0.36–3.74)
UROBILINOGEN UR QL STRIP.AUTO: 0.2 E.U./DL
VENTRICULAR RATE: 67 BPM
WBC # BLD AUTO: 6.59 THOUSAND/UL (ref 4.31–10.16)
WBC #/AREA URNS AUTO: NORMAL /HPF

## 2021-06-24 PROCEDURE — 93351 STRESS TTE COMPLETE: CPT | Performed by: INTERNAL MEDICINE

## 2021-06-24 PROCEDURE — 80053 COMPREHEN METABOLIC PANEL: CPT

## 2021-06-24 PROCEDURE — 99499EX: Performed by: FAMILY MEDICINE

## 2021-06-24 PROCEDURE — 93010 ELECTROCARDIOGRAM REPORT: CPT | Performed by: INTERNAL MEDICINE

## 2021-06-24 PROCEDURE — 93350 STRESS TTE ONLY: CPT

## 2021-06-24 PROCEDURE — 86141 C-REACTIVE PROTEIN HS: CPT

## 2021-06-24 PROCEDURE — 76700 US EXAM ABDOM COMPLETE: CPT

## 2021-06-24 PROCEDURE — 93306 TTE W/DOPPLER COMPLETE: CPT

## 2021-06-24 PROCEDURE — 36415 COLL VENOUS BLD VENIPUNCTURE: CPT

## 2021-06-24 PROCEDURE — 75571 CT HRT W/O DYE W/CA TEST: CPT

## 2021-06-24 PROCEDURE — 84439 ASSAY OF FREE THYROXINE: CPT

## 2021-06-24 PROCEDURE — 93922 UPR/L XTREMITY ART 2 LEVELS: CPT

## 2021-06-24 PROCEDURE — 80061 LIPID PANEL: CPT

## 2021-06-24 PROCEDURE — VASC: Performed by: SURGERY

## 2021-06-24 PROCEDURE — 81001 URINALYSIS AUTO W/SCOPE: CPT

## 2021-06-24 PROCEDURE — 83036 HEMOGLOBIN GLYCOSYLATED A1C: CPT

## 2021-06-24 PROCEDURE — 86803 HEPATITIS C AB TEST: CPT

## 2021-06-24 PROCEDURE — 93005 ELECTROCARDIOGRAM TRACING: CPT

## 2021-06-24 PROCEDURE — G1004 CDSM NDSC: HCPCS

## 2021-06-24 PROCEDURE — 84443 ASSAY THYROID STIM HORMONE: CPT

## 2021-06-24 PROCEDURE — 85025 COMPLETE CBC W/AUTO DIFF WBC: CPT

## 2021-06-24 PROCEDURE — 84153 ASSAY OF PSA TOTAL: CPT

## 2021-06-24 PROCEDURE — 93306 TTE W/DOPPLER COMPLETE: CPT | Performed by: INTERNAL MEDICINE

## 2021-06-24 PROCEDURE — 82306 VITAMIN D 25 HYDROXY: CPT

## 2021-06-24 NOTE — PROGRESS NOTES
Hearing Assessment Summary:     Hearing Screening    125Hz 250Hz 500Hz 1000Hz 2000Hz 3000Hz 4000Hz 6000Hz 8000Hz   Right ear:  21 20 21 25 27 40 50 40   Left ear:  21 20 13 30 40 45 45 45   Comments: HEARING EVALUATION    Name:  Raul Lowe  :  1965  Age:  60 y o  Date of Evaluation: 21     History: [facility] Exam  Reason for visit: Raul Lowe is being seen today for an evaluation of hearing  Patient reports mild difficulty with his hearing  He denies ear pain, tinnitus and dizziness  EVALUATION:    Otoscopic Evaluation:   Right Ear: Clear and healthy ear canal and tympanic membrane   Left Ear: Clear and healthy ear canal and tympanic membrane    Tympanometry:   Right: Type A - normal middle ear pressure and compliance   Left: Type A - normal middle ear pressure and compliance    Audiogram Results:  Normal through 1k Hz sloping to mild/moderate sensorineural hearing loss, bilaterally  Speech discrimination ability is very good in each ear  *see attached audiogram      RECOMMENDATIONS:  Annual hearing eval, Return to [facility]  for F/U, Copy to Patient/Caregiver and patient may consider hearing aid evaluation should he wish to pursue amplification  PATIENT EDUCATION:   Discussed results and recommendations with patient  Questions were addressed and the patient was encouraged to contact our department should concerns arise        [place] Hector   Clinical Audiologist

## 2021-06-24 NOTE — PROGRESS NOTES
Fitness Summary and Recommendations:  Karie Hoskins scored at the 5% on his body composition assessment with a bodyfat % of 35 3%  Karie Hoskins scored in the average range for flexibility with a sit & reach score of 21 cm  His Muscle Strength/Endurance scores placed him at the 60% (lower body) and 95% (upper body) with a chair stand score of 21 and arm curl score of 40 respectively  Alexeys Cardiovascular Score of 61 2 placed him at the 95%  Overall Karie Hoskins would be considered to have an above average fitness level with a 60% score  Continued emphasis on regular exercise and sound nutrition practices will enable Karie Hoskins to reach his optimal level of fitness

## 2021-06-24 NOTE — PROGRESS NOTES
HEART AND VASCULAR SUMMARY     1  Blood pressure: 122/80 mmHg, Pulse: 62 bpm     2  Lipids: Total 187, TG 54, HDL 55, , hs C-RP 1 6     3  ECG: Normal     4  Echocardiogram: Normal cardiac function, mild left ventricular hypertrophy, trace to mild aortic regurgitation     5  Stress ECHO: Excellent exercise capacity - no myocardial ischemia  Developed supraventricular tachycardia at peak exercise      6  Coronary Calcium CT: 14 (47th percentile)     7  Cardiovascular risk score: 5 8% risk of heart disease or stroke in next 10 years       Impression and Recommendations:     Mr Magdi Burk is a 60year old man who presents for an [facility]   He has no prior cardiac history  He does report occasional palpitations at rest, occurring less than once a month, particularly after he works out and does not hydrate  He does exercise on a regular basis  Of note during exercise stress echo, he did develop an asymptomatic supraventricular tachycardia at a rate of 160 bpm that self terminated after 2 minutes  1  Good cardiovascular health and fitness  2  Supraventricular tachycardia during exercise - 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

## 2021-06-24 NOTE — PROGRESS NOTES
Nutritional Summary and Recommendations:     Gogo Kirby presents for nutrition assessment and counseling  Alexey's goals include weight loss with initial goal of about 20 lbs  Discussion focused on dietary recommendations for weight loss and prediabetes  Encouraged continuation of physical activity  Ht: 5'10",  Wt: 241 0 lbs,  BMI: 34 6,  Tanita BMR: 2072 kcal,  Fat Mass: 85 0 lbs,  FFM: 156 0 lbs,  Desired Fat Mass: 44 0 lbs  Labs: 6/24/21 Vitamin D 37 4,  Cholesterol 187, Triglycerides 54, HDL 55, , A1C 5 7  Meds: Reviewed  Nutrition Diagnosis: Obesity related to excess energy intake over time as evidenced by BMI  Goals:  1  Achieve initial desired weight loss of 20 lbs within 6-12 months  2   Aim to consume small, frequent meals throughout the day  Avoid skipping meals, especially breakfast   3   Be mindful of portion sizes at meals and snacks, especially of carbohydrate-containing foods  Refer to MyPlate recommendations as discussed with RD   4   Include lean protein sources at all meals and snacks  5   Utilize food journal jada such as [id number] to track intake  6   Contact RD with any questions/concerns      Calorie goal for weight loss: About 2100 calories/day (Tanita BMR with activity factor minus 1000 calories for weight loss)  Protein goal: About 90 grams per day    Perceived Comprehension: Very Good  Expected Compliance: Very Good

## 2021-06-24 NOTE — PROGRESS NOTES
[facility] Physical Exam     Biju Patient is a 60 y o  male who is presenting for an [facility] Physical Exam at [address] [place] [address]  This is his 1st [facility] physical   He presents today with no significant complaints  His past medical history is fairly unremarkable  He is on no prescription medications  He sees his  PCP regularly for checkups  He is active and exercises almost daily doing both cardio and resistance exercises  He has gained approximately 10 lb over the last year which he attributes to working from home  He recently had minor dermatologic surgery though his pathology results were negative for any skin cancer  Review of Systems   Constitutional: Negative  HENT: Negative  Negative for congestion, ear pain, hearing loss, nosebleeds, sore throat and trouble swallowing  Eyes: Negative  Respiratory: Negative for apnea, cough, chest tightness, shortness of breath and wheezing  Cardiovascular: Negative  Gastrointestinal: Negative for abdominal pain, blood in stool, constipation, diarrhea, nausea and vomiting  Endocrine: Negative  Genitourinary: Negative for difficulty urinating, dysuria, frequency, hematuria and urgency  Musculoskeletal: Positive for arthralgias (right hip)  Negative for joint swelling and myalgias  Skin: Negative for rash  Neurological: Negative for dizziness, syncope, light-headedness, numbness and headaches  Hematological: Negative  Psychiatric/Behavioral: Negative for confusion, dysphoric mood and sleep disturbance  The patient is not nervous/anxious            Active Ambulatory Problems     Diagnosis Date Noted    Wellness examination 03/30/2018    Impaired fasting glucose 07/03/2017    Vitamin D deficiency 03/24/2017    Knee pain 06/24/2021     Resolved Ambulatory Problems     Diagnosis Date Noted    No Resolved Ambulatory Problems     Past Medical History:   Diagnosis Date    Arthritis     Infectious diarrhea     Overweight     Panic attacks     Paresthesias/numbness     Prediabetes     Prehypertension     Tinea versicolor     Varicose vein of leg     Wears glasses        Past Surgical History:   Procedure Laterality Date    APPENDECTOMY      LAST ASSESSED: [id number]    CHEILECTOMY Right 12/1/2017    Procedure: CHEILECTOMY;  Surgeon: Heide Carcamo DPM;  [facility]: [facility];  Service: Melissa Hsieh Left     as a child    HIP SURGERY      LAST ASSESSED: [id number]       Family History   Problem Relation Age of Onset    Diabetes Mother     Hypertension Mother     Prostate cancer Father     Heart attack Father     Diabetes Paternal Aunt        Social History     Tobacco Use   Smoking Status Never Smoker   Smokeless Tobacco Never Used         Current Outpatient Medications:     Cholecalciferol (VITAMIN D) 2000 units CAPS, Take 1 capsule by mouth daily in the early morning, Disp: , Rfl:     Fexofenadine HCl (ALLEGRA ALLERGY PO), Take by mouth, Disp: , Rfl:     fluticasone (FLONASE) 50 mcg/act nasal spray, 1 spray into each nostril daily, Disp: , Rfl:     multivitamin (THERAGRAN) TABS, Take 1 tablet by mouth daily in the early morning, Disp: , Rfl:     Turmeric 400 MG CAPS, Take by mouth as needed, Disp: , Rfl:     Allergies   Allergen Reactions    Seasonal Ic [Cholestatin]          Objective:    Vitals:    06/24/21 1041   BP: 122/80   BP Location: Right arm   Patient Position: Sitting   Cuff Size: Standard   Pulse: 62   Resp: 12   Temp: 98 4 °F (36 9 °C)   TempSrc: Tympanic   SpO2: 98%   Weight: 110 kg (243 lb)   Height: 5' 10" (1 778 m)        Physical Exam  Vitals and nursing note reviewed  Constitutional:       Appearance: Normal appearance  He is well-developed  HENT:      Head: Normocephalic and atraumatic        Right Ear: Hearing, tympanic membrane and external ear normal       Left Ear: Hearing, tympanic membrane, ear canal and external ear normal       Nose: Nose normal  No nasal deformity or rhinorrhea  Right Sinus: No maxillary sinus tenderness or frontal sinus tenderness  Left Sinus: No maxillary sinus tenderness or frontal sinus tenderness  Mouth/Throat:      Mouth: No oral lesions  Dentition: Normal dentition  Pharynx: Uvula midline  No oropharyngeal exudate or posterior oropharyngeal erythema  Eyes:      General: Lids are normal       Conjunctiva/sclera: Conjunctivae normal       Pupils: Pupils are equal, round, and reactive to light  Neck:      Thyroid: No thyroid mass or thyromegaly  Vascular: No carotid bruit or JVD  Trachea: Trachea normal    Cardiovascular:      Rate and Rhythm: Normal rate and regular rhythm  Pulses: Normal pulses  Carotid pulses are 2+ on the right side and 2+ on the left side  Radial pulses are 2+ on the right side and 2+ on the left side  Heart sounds: No murmur heard  Pulmonary:      Effort: No accessory muscle usage or respiratory distress  Breath sounds: Normal breath sounds  Abdominal:      General: Bowel sounds are normal  There is no distension  Palpations: Abdomen is soft  There is no mass  Tenderness: There is no abdominal tenderness  Hernia: No hernia is present  Musculoskeletal:         General: No tenderness or deformity  Normal range of motion  Cervical back: Normal range of motion and neck supple  Lymphadenopathy:      Cervical: No cervical adenopathy  Skin:     General: Skin is warm and dry  Findings: No lesion or rash  Neurological:      Mental Status: He is alert and oriented to person, place, and time  Cranial Nerves: No cranial nerve deficit  Sensory: No sensory deficit  Deep Tendon Reflexes: Reflexes are normal and symmetric  Psychiatric:         Speech: Speech normal          Behavior: Behavior normal          Thought Content:  Thought content normal          Judgment: Judgment normal  Assessment/Plan:     1  Hypothyroidism, unspecified type    2  Impaired fasting glucose         Executive Physical Summary:      1  Lab work  Blood testing shows good cholesterol control  Blood count, PSA, vitamin-D, urinalysis and chemistry panel all fine with the exception of mild elevation of fasting blood sugar  Hemoglobin A1c which checks for long-term control of blood sugar shows slight increase above normal range at 5 7 however this is actually improved from 3 years ago when it was 6 0  Continued good attention to diet and exercise habits keep this well controlled  TSH level done for thyroid is slightly elevated  This indicates possible mild hypothyroidism or underactive thyroid  This level has been elevated in the past and it is actually improved from what it was several years ago  Because the T4 level is normal it is unlikely that there would be any symptoms from this abnormality so it may not absolutely require treatment with thyroid replacement medicine  This should  continue to be monitored with PCP  2  Radiology studies  Abdominal ultrasound was normal including all internal organs  Carotid ultrasound test was normal and showed no blockages  3   Cardiac studies  EKG was normal   Echocardiogram showed normal pumping function with slightly thickened heart muscle  It also showed minor leakage in  Valves  Would recommend rechecking this in 2 years  Stress test showed no sign of ischemia or low blood flow during exercise  Abnormal rhythm heart did occur at the end stress test   Cardiology would recommend  2 week Holter monitor to follow-up on this and if it continued to show abnormalities then consultation with electrophysiologist would be recommended  Coronary calcium score was 14 which is in the 49th percentile  This means there has been some plaque in the coronary arteries which has become calcified  This should still be addressed with diet and exercise

## 2021-07-08 ENCOUNTER — VBI (OUTPATIENT)
Dept: ADMINISTRATIVE | Facility: OTHER | Age: 56
End: 2021-07-08

## 2021-07-08 NOTE — TELEPHONE ENCOUNTER
07/08/21 11:21 AM     See documentation in the [facility] SmartForm       Colan

## 2021-07-15 ENCOUNTER — OFFICE VISIT (OUTPATIENT)
Dept: PODIATRY | Facility: CLINIC | Age: 56
End: 2021-07-15
Payer: COMMERCIAL

## 2021-07-15 VITALS
DIASTOLIC BLOOD PRESSURE: 66 MMHG | HEART RATE: 59 BPM | BODY MASS INDEX: 35.45 KG/M2 | WEIGHT: 247.6 LBS | SYSTOLIC BLOOD PRESSURE: 138 MMHG | HEIGHT: 70 IN

## 2021-07-15 DIAGNOSIS — M79.672 LEFT FOOT PAIN: ICD-10-CM

## 2021-07-15 DIAGNOSIS — L85.2 KERATODERMA PUNCTATA: Primary | ICD-10-CM

## 2021-07-15 PROCEDURE — 99212 OFFICE O/P EST SF 10 MIN: CPT | Performed by: PODIATRIST

## 2021-07-15 PROCEDURE — 3008F BODY MASS INDEX DOCD: CPT | Performed by: PODIATRIST

## 2021-07-15 PROCEDURE — 1036F TOBACCO NON-USER: CPT | Performed by: PODIATRIST

## 2021-07-15 NOTE — PROGRESS NOTES
Patient presents for assessment of punctate hyperkeratotic lesion plantar lateral aspect left foot  Patient has been using salicylic acid for the past 1 month and there is improvement  Minimal pain is related  Trimmed hyperkeratotic lesion revealing continued presence of the porokeratosis  It is more superficial than at last visit  Patient told to continue with the topical salicylic acid  He will be reassessed in 4 weeks  Patient/Caregiver provided printed discharge information.

## 2021-08-03 ENCOUNTER — CLINICAL SUPPORT (OUTPATIENT)
Dept: CARDIOLOGY CLINIC | Facility: CLINIC | Age: 56
End: 2021-08-03
Payer: COMMERCIAL

## 2021-08-03 DIAGNOSIS — I47.1 SVT (SUPRAVENTRICULAR TACHYCARDIA) (HCC): ICD-10-CM

## 2021-08-03 PROCEDURE — 93244 EXT ECG>48HR<7D REV&INTERPJ: CPT | Performed by: INTERNAL MEDICINE

## 2021-08-10 ENCOUNTER — TELEPHONE (OUTPATIENT)
Dept: CARDIOLOGY CLINIC | Facility: CLINIC | Age: 56
End: 2021-08-10

## 2021-08-10 NOTE — TELEPHONE ENCOUNTER
Dr Camden Cunningham, this patient wore a Zio Patch and called for the results  Looks like he was an executive physical patient  Please advise

## 2021-08-11 NOTE — TELEPHONE ENCOUNTER
I spoke with patient by phone  He verbalized understanding of result per Dr Vinod Grubbs  Not symptomatic at this time, but will call if notices rapid HR or symptomatic

## 2021-08-11 NOTE — TELEPHONE ENCOUNTER
Patient had a min HR of 48 bpm, max HR of 128 bpm, and avg HR of 78  bpm  Predominant underlying rhythm was Sinus Rhythm  4  Supraventricular Tachycardia runs occurred, the run with the fastest  interval lasting 6 beats with a max rate of 128 bpm, the longest lasting 8  beats with an avg rate of 110 bpm  Isolated SVEs were rare (<1 0%), SVE  Couplets were rare (<1 0%), and SVE Triplets were rare (<1 0%)  Isolated  VEs were rare (<1 0%, 59), VE Triplets were rare (<1 0%, 1), and no VE  Couplets were present      Impression:  1  Brief episodes of SVT - longest lasting 8 beats  2  No symptoms associated with SVT        Please call patient and let him know his Zio monitor demonstrated several short runs of SVT, longest lasting 8 beats   If completely asymptomatic, no concerns at this time   If he becomes symptomatic and notices pulse remaining high for extended period of time (particularly after exercise - would monitor pulse for 1-2 minutes after cardio), please contact our office and we will get him in to see Electrophysiology   I do not think he needs to be seen at this time

## 2021-10-20 ENCOUNTER — TELEPHONE (OUTPATIENT)
Dept: OTHER | Facility: OTHER | Age: 56
End: 2021-10-20

## 2021-11-04 ENCOUNTER — TELEMEDICINE (OUTPATIENT)
Dept: FAMILY MEDICINE CLINIC | Facility: CLINIC | Age: 56
End: 2021-11-04
Payer: COMMERCIAL

## 2021-11-04 VITALS — TEMPERATURE: 98.2 F | SYSTOLIC BLOOD PRESSURE: 122 MMHG | DIASTOLIC BLOOD PRESSURE: 84 MMHG

## 2021-11-04 DIAGNOSIS — J01.01 ACUTE RECURRENT MAXILLARY SINUSITIS: Primary | ICD-10-CM

## 2021-11-04 PROCEDURE — 99213 OFFICE O/P EST LOW 20 MIN: CPT | Performed by: FAMILY MEDICINE

## 2021-11-04 PROCEDURE — 1036F TOBACCO NON-USER: CPT | Performed by: FAMILY MEDICINE

## 2021-11-04 RX ORDER — AMOXICILLIN AND CLAVULANATE POTASSIUM 875; 125 MG/1; MG/1
1 TABLET, FILM COATED ORAL 2 TIMES DAILY WITH MEALS
Qty: 20 TABLET | Refills: 0 | Status: SHIPPED | OUTPATIENT
Start: 2021-11-04 | End: 2021-11-14

## 2022-06-02 ENCOUNTER — RA CDI HCC (OUTPATIENT)
Dept: OTHER | Facility: HOSPITAL | Age: 57
End: 2022-06-02

## 2022-06-02 NOTE — PROGRESS NOTES
[facility]  coding opportunities       Chart reviewed, no opportunity found: CHART REVIEWED, NO OPPORTUNITY FOUND        Patients Insurance        Commercial Insurance: [address] [place] [address]

## 2022-06-08 ENCOUNTER — OFFICE VISIT (OUTPATIENT)
Dept: FAMILY MEDICINE CLINIC | Facility: CLINIC | Age: 57
End: 2022-06-08
Payer: COMMERCIAL

## 2022-06-08 ENCOUNTER — APPOINTMENT (OUTPATIENT)
Dept: LAB | Facility: CLINIC | Age: 57
End: 2022-06-08
Payer: COMMERCIAL

## 2022-06-08 VITALS
HEIGHT: 70 IN | BODY MASS INDEX: 34.5 KG/M2 | SYSTOLIC BLOOD PRESSURE: 130 MMHG | OXYGEN SATURATION: 98 % | WEIGHT: 241 LBS | TEMPERATURE: 96.7 F | RESPIRATION RATE: 14 BRPM | HEART RATE: 67 BPM | DIASTOLIC BLOOD PRESSURE: 88 MMHG

## 2022-06-08 DIAGNOSIS — Z12.5 SCREENING FOR PROSTATE CANCER: ICD-10-CM

## 2022-06-08 DIAGNOSIS — Z11.4 SCREENING FOR HIV (HUMAN IMMUNODEFICIENCY VIRUS): ICD-10-CM

## 2022-06-08 DIAGNOSIS — E55.9 VITAMIN D DEFICIENCY: ICD-10-CM

## 2022-06-08 DIAGNOSIS — R79.89 ABNORMAL TSH: ICD-10-CM

## 2022-06-08 DIAGNOSIS — Z00.00 ANNUAL PHYSICAL EXAM: Primary | ICD-10-CM

## 2022-06-08 DIAGNOSIS — R73.01 IMPAIRED FASTING GLUCOSE: ICD-10-CM

## 2022-06-08 DIAGNOSIS — Z13.6 SCREENING FOR CARDIOVASCULAR CONDITION: ICD-10-CM

## 2022-06-08 LAB
25(OH)D3 SERPL-MCNC: 46.3 NG/ML (ref 30–100)
ALBUMIN SERPL BCP-MCNC: 4.6 G/DL (ref 3.5–5)
ALP SERPL-CCNC: 55 U/L (ref 34–104)
ALT SERPL W P-5'-P-CCNC: 24 U/L (ref 7–52)
ANION GAP SERPL CALCULATED.3IONS-SCNC: 6 MMOL/L (ref 4–13)
AST SERPL W P-5'-P-CCNC: 24 U/L (ref 13–39)
BILIRUB SERPL-MCNC: 0.72 MG/DL (ref 0.2–1)
BUN SERPL-MCNC: 18 MG/DL (ref 5–25)
CALCIUM SERPL-MCNC: 9.7 MG/DL (ref 8.4–10.2)
CHLORIDE SERPL-SCNC: 102 MMOL/L (ref 96–108)
CHOLEST SERPL-MCNC: 193 MG/DL
CO2 SERPL-SCNC: 29 MMOL/L (ref 21–32)
CREAT SERPL-MCNC: 0.94 MG/DL (ref 0.6–1.3)
GFR SERPL CREATININE-BSD FRML MDRD: 88 ML/MIN/1.73SQ M
GLUCOSE P FAST SERPL-MCNC: 101 MG/DL (ref 65–99)
HDLC SERPL-MCNC: 44 MG/DL
LDLC SERPL CALC-MCNC: 135 MG/DL (ref 0–100)
POTASSIUM SERPL-SCNC: 4.2 MMOL/L (ref 3.5–5.3)
PROT SERPL-MCNC: 7.2 G/DL (ref 6.4–8.4)
PSA SERPL-MCNC: 2.5 NG/ML (ref 0–4)
SODIUM SERPL-SCNC: 137 MMOL/L (ref 135–147)
T4 FREE SERPL-MCNC: 0.99 NG/DL (ref 0.76–1.46)
TRIGL SERPL-MCNC: 71 MG/DL
TSH SERPL DL<=0.05 MIU/L-ACNC: 4.68 UIU/ML (ref 0.45–4.5)

## 2022-06-08 PROCEDURE — 83036 HEMOGLOBIN GLYCOSYLATED A1C: CPT

## 2022-06-08 PROCEDURE — 87389 HIV-1 AG W/HIV-1&-2 AB AG IA: CPT

## 2022-06-08 PROCEDURE — 84443 ASSAY THYROID STIM HORMONE: CPT

## 2022-06-08 PROCEDURE — 80053 COMPREHEN METABOLIC PANEL: CPT

## 2022-06-08 PROCEDURE — 3725F SCREEN DEPRESSION PERFORMED: CPT | Performed by: FAMILY MEDICINE

## 2022-06-08 PROCEDURE — 82306 VITAMIN D 25 HYDROXY: CPT

## 2022-06-08 PROCEDURE — 80061 LIPID PANEL: CPT

## 2022-06-08 PROCEDURE — 3008F BODY MASS INDEX DOCD: CPT | Performed by: FAMILY MEDICINE

## 2022-06-08 PROCEDURE — 36415 COLL VENOUS BLD VENIPUNCTURE: CPT

## 2022-06-08 PROCEDURE — 1036F TOBACCO NON-USER: CPT | Performed by: FAMILY MEDICINE

## 2022-06-08 PROCEDURE — G0103 PSA SCREENING: HCPCS

## 2022-06-08 PROCEDURE — 99396 PREV VISIT EST AGE 40-64: CPT | Performed by: FAMILY MEDICINE

## 2022-06-08 PROCEDURE — 84439 ASSAY OF FREE THYROXINE: CPT

## 2022-06-08 NOTE — PROGRESS NOTES
[address] [facility]    NAME: Koffi Garcia  AGE: 59 y o  SEX: male  : 1965     DATE: 2022     Assessment and Plan:     Problem List Items Addressed This Visit        Endocrine    Impaired fasting glucose    Relevant Orders    Comprehensive metabolic panel    HEMOGLOBIN A1C W/ EAG ESTIMATION       Other    Vitamin D deficiency    Relevant Orders    Vitamin D 25 hydroxy      Other Visit Diagnoses     Annual physical exam    -  Primary    Aj Owens appears well  He is to continue to focus on a lower carb diet, & his excellent exercise regimen  FBW ordered  Screening for cardiovascular condition        Relevant Orders    Lipid Panel with Direct LDL reflex    Abnormal TSH        Hx of -> with difficulty losing weight, will recheck TSH  Relevant Orders    TSH, 3rd generation with Free T4 reflex    Screening for prostate cancer        PSA incl with labs  Relevant Orders    PSA, Total Screen    Screening for HIV (human immunodeficiency virus)        One time screen  Relevant Orders    HIV 1/2 Antigen/Antibody (4th Generation) w Reflex [facility]    BMI 34 0-34 9,adult        Diet and exercise as above  Immunizations and preventive care screenings were discussed with patient today  Appropriate education was printed on patient's after visit summary  Counseling:  Dental Health: discussed importance of regular tooth brushing, flossing, and dental visits  · Exercise: the importance of regular exercise/physical activity was discussed  Recommend exercise 3-5 times per week for at least 30 minutes  BMI Counseling: Body mass index is 34 5 kg/m²  The BMI is above normal  Nutrition recommendations include moderation in carbohydrate intake  Exercise recommendations include exercising 3-5 times per week  No pharmacotherapy was ordered  Patient referred to PCP   Rationale for BMI follow-up plan is due to patient being overweight or obese  Depression Screening and Follow-up Plan: Patient was screened for depression during today's encounter  They screened negative with a PHQ-2 score of 0  Return in 1 year (on 6/8/2023) for Annual physical      Chief Complaint:     Chief Complaint   Patient presents with    Physical Exam     Patient being seen for physical       History of Present Illness:     Adult Annual Physical   Patient here for a comprehensive physical exam  The patient reports no problems  Colonoscopy is UTD - good until 2025  Had Tdap in 2014  Pt is vaccinated against COV-19 with the Pfizer vaccine series, with one booster dose  Continues to see [facility]  Diet and Physical Activity  · Diet/Nutrition: well balanced diet  Still drinks IPAs  · Exercise: 5-7 times a week on average  Depression Screening  PHQ-2/9 Depression Screening    Little interest or pleasure in doing things: 0 - not at all  Feeling down, depressed, or hopeless: 0 - not at all  PHQ-2 Score: 0  PHQ-2 Interpretation: Negative depression screen       General Health  · Sleep: sleeps well  · Hearing: normal - bilateral   · Vision: no vision problems  · Dental: regular dental visits   Health  · Symptoms include: none     Review of Systems:     Review of Systems   Constitutional: Negative for activity change  Respiratory: Negative for shortness of breath  Cardiovascular: Negative for chest pain  Gastrointestinal: Negative for abdominal pain and blood in stool  Genitourinary: Negative for decreased urine volume and difficulty urinating  Psychiatric/Behavioral: Negative for dysphoric mood  The patient is not nervous/anxious         Past Medical History:     Past Medical History:   Diagnosis Date    Arthritis     r foot    Infectious diarrhea     LAST ASSESSED: [id number]    Overweight     LAST ASSESSED: [id number]    Panic attacks     last one 4-5 months ago    Paresthesias/numbness     LAST ASSESSED: [id number]    Prediabetes     LAST ASSESSED: [id number]    Prehypertension     LAST ASSESSED: [id number]    Tinea versicolor     LAST ASSESSED: [id number]    Varicose vein of leg     r leg    Vitamin D deficiency     LAST ASSESSED: [id number]    Wears glasses       Past Surgical History:     Past Surgical History:   Procedure Laterality Date    APPENDECTOMY      LAST ASSESSED: [id number]    CHEILECTOMY Right 12/1/2017    Procedure: CHEILECTOMY;  Surgeon: Rocco Johnson DPM;  Location: [facility] OR;  Service: Jone Kanner Left     as a child    HIP SURGERY      LAST ASSESSED: [id number]      Family History:     Family History   Problem Relation Age of Onset    Diabetes Mother     Hypertension Mother     Prostate cancer Father     Heart attack Father     Diabetes Paternal Aunt       Social History:     Social History     Socioeconomic History    Marital status: /Civil Union     Spouse name: None    Number of children: None    Years of education: None    Highest education level: None   Occupational History    None   Tobacco Use    Smoking status: Never Smoker    Smokeless tobacco: Never Used   Vaping Use    Vaping Use: Never used   Substance and Sexual Activity    Alcohol use:  Yes     Alcohol/week: 12 0 standard drinks     Types: 12 Cans of beer per week     Comment: (HISTORY), SOCIAL    Drug use: No    Sexual activity: None   Other Topics Concern    None   Social History Narrative    None     Social Determinants of Health     Financial Resource Strain: Not on file   Food Insecurity: Not on file   Transportation Needs: Not on file   Physical Activity: Not on file   Stress: Not on file   Social Connections: Not on file   Intimate Partner Violence: Not on file   Housing Stability: Not on file      Current Medications:     Current Outpatient Medications   Medication Sig Dispense Refill    Cholecalciferol (VITAMIN D) 2000 units CAPS Take 1 capsule by mouth daily in the early morning      Fexofenadine HCl (ALLEGRA ALLERGY PO) Take by mouth      fluticasone (FLONASE) 50 mcg/act nasal spray 1 spray into each nostril daily      multivitamin (THERAGRAN) TABS Take 1 tablet by mouth daily in the early morning      Turmeric 400 MG CAPS Take by mouth as needed (Patient not taking: No sig reported)       No current facility-administered medications for this visit  Allergies: Allergies   Allergen Reactions    Seasonal Ic [Cholestatin]       Physical Exam:     /88 (BP Location: Left arm, Patient Position: Sitting, Cuff Size: Large)   Pulse 67   Temp (!) 96 7 °F (35 9 °C) (Tympanic)   Resp 14   Ht 5' 10 08" (1 78 m)   Wt 109 kg (241 lb)   SpO2 98%   BMI 34 50 kg/m²     Physical Exam  Vitals and nursing note reviewed  Constitutional:       General: He is not in acute distress  Appearance: Normal appearance  He is not ill-appearing, toxic-appearing or diaphoretic  HENT:      Head: Normocephalic and atraumatic  Eyes:      General: No scleral icterus  Conjunctiva/sclera: Conjunctivae normal    Cardiovascular:      Rate and Rhythm: Normal rate and regular rhythm  Heart sounds: Normal heart sounds  No murmur heard  No friction rub  No gallop  Pulmonary:      Effort: Pulmonary effort is normal  No respiratory distress  Breath sounds: Normal breath sounds  No stridor  No wheezing, rhonchi or rales  Abdominal:      General: Abdomen is flat  Bowel sounds are normal  There is no distension  Palpations: Abdomen is soft  There is no mass  Tenderness: There is no abdominal tenderness  There is no guarding or rebound  Genitourinary:     Prostate: Normal       Rectum: Normal       Comments: No gross blood on the examining finger  Musculoskeletal:      Cervical back: Normal range of motion and neck supple  No rigidity or tenderness  Lymphadenopathy:      Cervical: No cervical adenopathy     Neurological:      Mental Status: He is alert and oriented to person, place, and time  Psychiatric:         Mood and Affect: Mood normal          Behavior: Behavior normal          Thought Content: Thought content normal          Judgment: Judgment normal           Tenzin Benítez was seen today for physical exam     Diagnoses and all orders for this visit:    Annual physical exam  Comments:  Wally Russo appears well  He is to continue to focus on a lower carb diet, & his excellent exercise regimen  FBW ordered  Impaired fasting glucose  Comments:  Hx of -> A1c incl with FBW ordered  Orders:  -     Comprehensive metabolic panel; Future  -     HEMOGLOBIN A1C W/ EAG ESTIMATION; Future    Vitamin D deficiency  Comments: On an OTC Vit D3 supplement  Check Vit D level  Orders:  -     Vitamin D 25 hydroxy; Future    Screening for cardiovascular condition  -     Lipid Panel with Direct LDL reflex; Future    Abnormal TSH  Comments:  Hx of -> with difficulty losing weight, will recheck TSH  Orders:  -     TSH, 3rd generation with Free T4 reflex; Future    Screening for prostate cancer  Comments:  PSA incl with labs  Orders:  -     PSA, Total Screen; Future    Screening for HIV (human immunodeficiency virus)  Comments:  One time screen  Orders:  -     HIV 1/2 Antigen/Antibody (4th Generation) w Reflex [facility]; Future    BMI 34 0-34 9,adult  Comments:  Diet and exercise as above            Carlos Becker, [address] [place] [address]

## 2022-06-08 NOTE — PATIENT INSTRUCTIONS

## 2022-06-09 LAB
EST. AVERAGE GLUCOSE BLD GHB EST-MCNC: 120 MG/DL
HBA1C MFR BLD: 5.8 %
HIV 1+2 AB+HIV1 P24 AG SERPL QL IA: NORMAL

## 2022-06-11 DIAGNOSIS — R79.89 ABNORMAL TSH: ICD-10-CM

## 2022-06-11 DIAGNOSIS — R73.01 IMPAIRED FASTING GLUCOSE: Primary | ICD-10-CM

## 2022-06-11 DIAGNOSIS — E78.5 MILD HYPERLIPIDEMIA: ICD-10-CM

## 2022-06-11 DIAGNOSIS — R97.20 RISING PSA LEVEL: ICD-10-CM

## 2022-07-30 ENCOUNTER — APPOINTMENT (EMERGENCY)
Dept: RADIOLOGY | Facility: HOSPITAL | Age: 57
End: 2022-07-30
Payer: COMMERCIAL

## 2022-07-30 ENCOUNTER — HOSPITAL ENCOUNTER (OUTPATIENT)
Facility: HOSPITAL | Age: 57
Setting detail: OBSERVATION
Discharge: HOME | End: 2022-07-31
Attending: EMERGENCY MEDICINE | Admitting: INTERNAL MEDICINE
Payer: COMMERCIAL

## 2022-07-30 DIAGNOSIS — R77.8 ELEVATED TROPONIN: ICD-10-CM

## 2022-07-30 DIAGNOSIS — R00.2 PALPITATIONS: Primary | ICD-10-CM

## 2022-07-30 PROBLEM — R79.89 ELEVATED TROPONIN: Status: ACTIVE | Noted: 2022-07-30

## 2022-07-30 LAB
2HR DELTA HS TROPONIN: 13 NG/L
4HR DELTA HS TROPONIN: 5 NG/L
ALBUMIN SERPL BCP-MCNC: 4.2 G/DL (ref 3.5–5)
ALP SERPL-CCNC: 46 U/L (ref 34–104)
ALT SERPL W P-5'-P-CCNC: 18 U/L (ref 7–52)
ANION GAP SERPL CALCULATED.3IONS-SCNC: 5 MMOL/L (ref 4–13)
AST SERPL W P-5'-P-CCNC: 24 U/L (ref 13–39)
BASOPHILS # BLD AUTO: 0.02 THOUSANDS/ΜL (ref 0–0.1)
BASOPHILS NFR BLD AUTO: 0 % (ref 0–1)
BILIRUB SERPL-MCNC: 0.72 MG/DL (ref 0.2–1)
BUN SERPL-MCNC: 18 MG/DL (ref 5–25)
CALCIUM SERPL-MCNC: 9.1 MG/DL (ref 8.4–10.2)
CARDIAC TROPONIN I PNL SERPL HS: 77 NG/L
CARDIAC TROPONIN I PNL SERPL HS: 82 NG/L
CARDIAC TROPONIN I PNL SERPL HS: 90 NG/L
CHLORIDE SERPL-SCNC: 101 MMOL/L (ref 96–108)
CO2 SERPL-SCNC: 29 MMOL/L (ref 21–32)
CREAT SERPL-MCNC: 0.93 MG/DL (ref 0.6–1.3)
EOSINOPHIL # BLD AUTO: 0.06 THOUSAND/ΜL (ref 0–0.61)
EOSINOPHIL NFR BLD AUTO: 1 % (ref 0–6)
ERYTHROCYTE [DISTWIDTH] IN BLOOD BY AUTOMATED COUNT: 12.7 % (ref 11.6–15.1)
GFR SERPL CREATININE-BSD FRML MDRD: 89 ML/MIN/1.73SQ M
GLUCOSE SERPL-MCNC: 103 MG/DL (ref 65–140)
HCT VFR BLD AUTO: 40.1 % (ref 36.5–49.3)
HGB BLD-MCNC: 13.9 G/DL (ref 12–17)
IMM GRANULOCYTES # BLD AUTO: 0.02 THOUSAND/UL (ref 0–0.2)
IMM GRANULOCYTES NFR BLD AUTO: 0 % (ref 0–2)
LYMPHOCYTES # BLD AUTO: 2.39 THOUSANDS/ΜL (ref 0.6–4.47)
LYMPHOCYTES NFR BLD AUTO: 24 % (ref 14–44)
MAGNESIUM SERPL-MCNC: 1.9 MG/DL (ref 1.9–2.7)
MCH RBC QN AUTO: 29.6 PG (ref 26.8–34.3)
MCHC RBC AUTO-ENTMCNC: 34.7 G/DL (ref 31.4–37.4)
MCV RBC AUTO: 86 FL (ref 82–98)
MONOCYTES # BLD AUTO: 0.73 THOUSAND/ΜL (ref 0.17–1.22)
MONOCYTES NFR BLD AUTO: 7 % (ref 4–12)
NEUTROPHILS # BLD AUTO: 6.77 THOUSANDS/ΜL (ref 1.85–7.62)
NEUTS SEG NFR BLD AUTO: 68 % (ref 43–75)
NRBC BLD AUTO-RTO: 0 /100 WBCS
PLATELET # BLD AUTO: 229 THOUSANDS/UL (ref 149–390)
PMV BLD AUTO: 10.1 FL (ref 8.9–12.7)
POTASSIUM SERPL-SCNC: 4.2 MMOL/L (ref 3.5–5.3)
PROT SERPL-MCNC: 6.5 G/DL (ref 6.4–8.4)
RBC # BLD AUTO: 4.69 MILLION/UL (ref 3.88–5.62)
SODIUM SERPL-SCNC: 135 MMOL/L (ref 135–147)
WBC # BLD AUTO: 9.99 THOUSAND/UL (ref 4.31–10.16)

## 2022-07-30 PROCEDURE — 99285 EMERGENCY DEPT VISIT HI MDM: CPT | Performed by: EMERGENCY MEDICINE

## 2022-07-30 PROCEDURE — 71045 X-RAY EXAM CHEST 1 VIEW: CPT

## 2022-07-30 PROCEDURE — 99219 PR INITIAL OBSERVATION CARE/DAY 50 MINUTES: CPT | Performed by: PHYSICIAN ASSISTANT

## 2022-07-30 PROCEDURE — 80053 COMPREHEN METABOLIC PANEL: CPT

## 2022-07-30 PROCEDURE — 93005 ELECTROCARDIOGRAM TRACING: CPT

## 2022-07-30 PROCEDURE — 36415 COLL VENOUS BLD VENIPUNCTURE: CPT

## 2022-07-30 PROCEDURE — 85025 COMPLETE CBC W/AUTO DIFF WBC: CPT

## 2022-07-30 PROCEDURE — 83735 ASSAY OF MAGNESIUM: CPT

## 2022-07-30 PROCEDURE — 99285 EMERGENCY DEPT VISIT HI MDM: CPT

## 2022-07-30 PROCEDURE — 84484 ASSAY OF TROPONIN QUANT: CPT

## 2022-07-30 RX ORDER — ACETAMINOPHEN 325 MG/1
650 TABLET ORAL EVERY 6 HOURS PRN
Status: DISCONTINUED | OUTPATIENT
Start: 2022-07-30 | End: 2022-07-31 | Stop reason: HOSPADM

## 2022-07-30 RX ORDER — MELATONIN
1000 DAILY
Status: DISCONTINUED | OUTPATIENT
Start: 2022-07-31 | End: 2022-07-31 | Stop reason: HOSPADM

## 2022-07-30 NOTE — ED PROVIDER NOTES
History  Chief Complaint   Patient presents with    Palpitations     Patient reports 10min episode of palpitations and "heart racing" (watch reading 160-170bpm)  Lightheadedness and dizziness at time of palpitations  Reports sensation has now resolved  Denies CP/SOB/HA at present  Mr Bridgette Guerrero is a 59 yom presenting with cc palpitations  Was playing golf tournament this morning, states that he felt sudden onset of lightheadedness and mental "fog", onset at 1030, kept playing golf but stopped about an hour later when his symptoms became so severe he could not continue playing  Sat down around 1130, noted his heart rate on watch, was between 160-170, had NP at course confirm pulse, which was not only fast but also irregular  Resolved spontaneously after 1 hour and 45 minutes, now asymptomatic  Endorses drinking plenty of fluids, has not had any caffeine intake today, only medication or supplement is daily multivitamin  Had similar episode a week ago which lasted 5 minutes  Denies fever, chills, weakness, vision changes, diaphoresis, chest pain, shortness of breath, abdominal pain, nausea, vomiting, peripheral edema, or unilateral calf pain or swelling  Denies any medical problems, denies personal or family history of cardiac problems or blood clots  No recent travel or sick contacts  Prior to Admission Medications   Prescriptions Last Dose Informant Patient Reported? Taking?    Cholecalciferol (VITAMIN D) 2000 units CAPS  Self Yes Yes   Sig: Take 1 capsule by mouth daily in the early morning   Fexofenadine HCl (ALLEGRA ALLERGY PO)  Self Yes Yes   Sig: Take by mouth   fluticasone (FLONASE) 50 mcg/act nasal spray  Self Yes Yes   Si spray into each nostril daily   multivitamin (THERAGRAN) TABS  Self Yes Yes   Sig: Take 1 tablet by mouth daily in the early morning      Facility-Administered Medications: None       Past Medical History:   Diagnosis Date    Arthritis     r foot    Infectious diarrhea LAST ASSESSED: [id number]    Overweight     LAST ASSESSED: [id number]    Panic attacks     last one 4-5 months ago    Paresthesias/numbness     LAST ASSESSED: [id number]    Prediabetes     LAST ASSESSED: [id number]    Prehypertension     LAST ASSESSED: [id number]    Tinea versicolor     LAST ASSESSED: [id number]    Varicose vein of leg     r leg    Vitamin D deficiency     LAST ASSESSED: [id number]    Wears glasses        Past Surgical History:   Procedure Laterality Date    APPENDECTOMY      LAST ASSESSED: [id number]    CHEILECTOMY Right 12/1/2017    Procedure: CHEILECTOMY;  Surgeon: Gabriel Cancino DPM;  Location: [facility];  Service: Podiatry    HIP PINNING Left     as a child    HIP SURGERY      LAST ASSESSED: [id number]       Family History   Problem Relation Age of Onset    Diabetes Mother     Hypertension Mother     Prostate cancer Father     Heart attack Father     Diabetes Paternal Aunt      I have reviewed and agree with the history as documented  E-Cigarette/Vaping    E-Cigarette Use Never User      E-Cigarette/Vaping Substances    Nicotine No     THC No     CBD No     Flavoring No     Other No     Unknown No      Social History     Tobacco Use    Smoking status: Never Smoker    Smokeless tobacco: Never Used   Vaping Use    Vaping Use: Never used   Substance Use Topics    Alcohol use: Yes     Alcohol/week: 12 0 standard drinks     Types: 12 Cans of beer per week     Comment: (HISTORY), SOCIAL    Drug use: No        Review of Systems   Constitutional: Negative  Negative for activity change, appetite change, chills, diaphoresis, fatigue, fever and unexpected weight change  HENT: Negative  Negative for congestion, postnasal drip, rhinorrhea, sneezing and sore throat  Eyes: Negative  Negative for visual disturbance  Respiratory: Negative  Negative for cough and shortness of breath  Cardiovascular: Positive for palpitations  Negative for chest pain and leg swelling  Gastrointestinal: Negative  Negative for abdominal distention, abdominal pain, blood in stool, constipation, diarrhea, nausea and vomiting  Endocrine: Negative  Negative for cold intolerance, heat intolerance, polydipsia, polyphagia and polyuria  Genitourinary: Negative  Negative for difficulty urinating, dysuria, flank pain and hematuria  Musculoskeletal: Negative  Negative for arthralgias, back pain, myalgias and neck pain  Skin: Negative  Negative for color change, pallor, rash and wound  Allergic/Immunologic: Negative  Neurological: Positive for light-headedness  Negative for dizziness, syncope, facial asymmetry, speech difficulty, weakness, numbness and headaches  Hematological: Negative  Does not bruise/bleed easily  Psychiatric/Behavioral: Negative  Negative for confusion  All other systems reviewed and are negative  Physical Exam  ED Triage Vitals   Temperature Pulse Respirations Blood Pressure SpO2   07/30/22 1436 07/30/22 1436 07/30/22 1436 07/30/22 1436 07/30/22 1436   98 2 °F (36 8 °C) 103 18 168/64 96 %      Temp Source Heart Rate Source Patient Position - Orthostatic VS BP Location FiO2 (%)   07/30/22 1436 07/30/22 1436 07/30/22 1436 07/30/22 1436 --   Oral Monitor Sitting Left arm       Pain Score       07/30/22 1452       No Pain             Orthostatic Vital Signs  Vitals:    07/30/22 1630 07/30/22 1900 07/30/22 2034 07/31/22 0806   BP: 117/78 123/73 143/85 119/84   Pulse: 70 72 73 60   Patient Position - Orthostatic VS: Lying Lying         Physical Exam  Vitals and nursing note reviewed  Exam conducted with a chaperone present  Constitutional:       General: He is not in acute distress  Appearance: Normal appearance  He is not ill-appearing or diaphoretic  HENT:      Head: Normocephalic and atraumatic        Right Ear: External ear normal       Left Ear: External ear normal       Nose: Nose normal       Mouth/Throat:      Mouth: Mucous membranes are moist  Pharynx: Oropharynx is clear  Eyes:      General: No scleral icterus  Right eye: No discharge  Left eye: No discharge  Extraocular Movements: Extraocular movements intact  Conjunctiva/sclera: Conjunctivae normal    Cardiovascular:      Rate and Rhythm: Normal rate and regular rhythm  Pulses: Normal pulses  Radial pulses are 2+ on the right side and 2+ on the left side  Dorsalis pedis pulses are 2+ on the right side and 2+ on the left side  Heart sounds: Normal heart sounds  No murmur heard  No friction rub  No gallop  Pulmonary:      Effort: Pulmonary effort is normal  No respiratory distress  Breath sounds: Normal breath sounds  No wheezing, rhonchi or rales  Abdominal:      General: Abdomen is flat  Bowel sounds are normal  There is no distension  Palpations: Abdomen is soft  Tenderness: There is no abdominal tenderness  There is no guarding or rebound  Musculoskeletal:         General: No swelling or tenderness  Normal range of motion  Cervical back: Normal range of motion and neck supple  No tenderness  Right lower leg: No edema  Left lower leg: No edema  Lymphadenopathy:      Cervical: No cervical adenopathy  Skin:     General: Skin is warm and dry  Capillary Refill: Capillary refill takes less than 2 seconds  Coloration: Skin is not jaundiced or pale  Findings: No bruising, erythema or rash  Neurological:      General: No focal deficit present  Mental Status: He is alert and oriented to person, place, and time        Gait: Gait normal    Psychiatric:         Mood and Affect: Mood normal          Behavior: Behavior normal          ED Medications  Medications - No data to display    Diagnostic Studies  Results Reviewed     Procedure Component Value Units Date/Time    HS Troponin I 4hr [[id number]]  (Abnormal) Collected: 07/30/22 2031    Lab Status: Final result Specimen: Blood from Arm, Left Updated: 07/30/22 2110     hs TnI 4hr 82 ng/L      Delta 4hr hsTnI 5 ng/L     HS Troponin I 2hr [[id number]]  (Abnormal) Collected: 07/30/22 1818    Lab Status: Final result Specimen: Blood from Arm, Right Updated: 07/30/22 1855     hs TnI 2hr 90 ng/L      Delta 2hr hsTnI 13 ng/L     HS Troponin 0hr (reflex protocol) [[id number]]  (Abnormal) Collected: 07/30/22 1617    Lab Status: Final result Specimen: Blood from Arm, Right Updated: 07/30/22 1652     hs TnI 0hr 77 ng/L     Comprehensive metabolic panel [[id number]] Collected: 07/30/22 1617    Lab Status: Final result Specimen: Blood from Arm, Right Updated: 07/30/22 1646     Sodium 135 mmol/L      Potassium 4 2 mmol/L      Chloride 101 mmol/L      CO2 29 mmol/L      ANION GAP 5 mmol/L      BUN 18 mg/dL      Creatinine 0 93 mg/dL      Glucose 103 mg/dL      Calcium 9 1 mg/dL      AST 24 U/L      ALT 18 U/L      Alkaline Phosphatase 46 U/L      Total Protein 6 5 g/dL      Albumin 4 2 g/dL      Total Bilirubin 0 72 mg/dL      eGFR 89 ml/min/1 73sq m     Narrative:      [place] guidelines for Chronic Kidney Disease (CKD):     Stage 1 with normal or high GFR (GFR > 90 mL/min/1 73 square meters)    Stage 2 Mild CKD (GFR = 60-89 mL/min/1 73 square meters)    Stage 3A Moderate CKD (GFR = 45-59 mL/min/1 73 square meters)    Stage 3B Moderate CKD (GFR = 30-44 mL/min/1 73 square meters)    Stage 4 Severe CKD (GFR = 15-29 mL/min/1 73 square meters)    Stage 5 End Stage CKD (GFR <15 mL/min/1 73 square meters)  Note: GFR calculation is accurate only with a steady state creatinine    Magnesium [[id number]]  (Normal) Collected: 07/30/22 1617    Lab Status: Final result Specimen: Blood from Arm, Right Updated: 07/30/22 1646     Magnesium 1 9 mg/dL     CBC and differential [[id number]] Collected: 07/30/22 1617    Lab Status: Final result Specimen: Blood from Arm, Right Updated: 07/30/22 1623     WBC 9 99 Thousand/uL      RBC 4 69 Million/uL Hemoglobin 13 9 g/dL      Hematocrit 40 1 %      MCV 86 fL      MCH 29 6 pg      MCHC 34 7 g/dL      RDW 12 7 %      MPV 10 1 fL      Platelets 916 Thousands/uL      nRBC 0 /100 WBCs      Neutrophils Relative 68 %      Immat GRANS % 0 %      Lymphocytes Relative 24 %      Monocytes Relative 7 %      Eosinophils Relative 1 %      Basophils Relative 0 %      Neutrophils Absolute 6 77 Thousands/µL      Immature Grans Absolute 0 02 Thousand/uL      Lymphocytes Absolute 2 39 Thousands/µL      Monocytes Absolute 0 73 Thousand/µL      Eosinophils Absolute 0 06 Thousand/µL      Basophils Absolute 0 02 Thousands/µL                  XR chest 1 view portable   ED Interpretation by Sarah Traore DO (07/30 1546)   No obvious cardiopulmonary findings  Final Result by Vidhya Perez MD ([id number])      No acute cardiopulmonary disease                    Workstation performed: [id number]               Procedures  ECG 12 Lead Documentation Only    Date/Time: 7/30/2022 3:02 PM  Performed by: Sarah Traore DO  Authorized by: Sarah Traore DO     Indications / Diagnosis:  Palpitations, now resolved  ECG reviewed by me, the ED Provider: yes    Patient location:  ED  Previous ECG:     Previous ECG:  Compared to current    Comparison ECG info:  6/24/2021    Similarity:  No change    Comparison to cardiac monitor: Yes    Interpretation:     Interpretation: non-specific    Rate:     ECG rate:  87    ECG rate assessment: normal    Rhythm:     Rhythm: sinus rhythm    Ectopy:     Ectopy: none    QRS:     QRS axis:  Normal    QRS intervals:  Normal  Conduction:     Conduction: normal    ST segments:     ST segments:  Normal  T waves:     T waves: inverted      Inverted:  III and aVF          ED Course  ED Course as of 08/05/22 1916   Sat Jul 30, 2022   1702 hs TnI 0hr(!): 77                             SBIRT 22yo+    Flowsheet Row Most Recent Value   SBIRT (23 yo +)    In order to provide better care to our patients, we are screening all of our patients for alcohol and drug use  Would it be okay to ask you these screening questions? Unable to answer at this time Filed at: 07/30/2022 1453                [facility]  Number of Diagnoses or Management Options  Elevated troponin  Palpitations  Diagnosis management comments: Pt is a 59 yom presenting after episode of palpitations lasting almost 2 hours, spontaneously resolved prior to arrival, currently asymptomatic  Pt is well appearing, conversational, VS stable, physical exam unremarkable  No evidence of DVT  Labs including CBC, CMP, magnesium  Initial HS trop elevated at 77, with 2 hour trop delta 13  ECG NSR, rate 87, without evidence of ischemia, infarct, or arrhythmia  Doubt ACS, but cannot exclude demand/arrhythmia related elevated trops  CXR unremarkable  Will admit to obs/tele, possible cardiac eval, also pending 4 hour trop  Stable, asymptomatic on admission  Amount and/or Complexity of Data Reviewed  Clinical lab tests: ordered and reviewed  Tests in the radiology section of CPT®: ordered and reviewed  Discuss the patient with other providers: yes  Independent visualization of images, tracings, or specimens: yes        Disposition  Final diagnoses:   Palpitations   Elevated troponin     Time reflects when diagnosis was documented in both MDM as applicable and the Disposition within this note     Time User Action Codes Description Comment    7/30/2022  7:24 PM Crystal Boudreaux Add [R00 2] Palpitations     7/30/2022  7:24 PM Crystal Boudreaux Add [R77 8] Elevated troponin       ED Disposition     ED Disposition   Admit    Condition   Stable    Date/Time   Sat Jul 30, 2022  7:25 PM    Comment   Case was discussed with Dr Jacobo Goldberg and the patient's admission status was agreed to be Admission Status: observation status to the service of Dr Jacobo Goldberg              Follow-up Information     Follow up With Specialties Details Why Contact Info Additional Information    [address] Yolande Becker DO Family Medicine Schedule an appointment as soon as possible for a visit in 1 week(s)  Ezekiel [address] [facility] Cardiology Follow up in 2 week(s)  [address] [place] [address] [place] [facility] [address] [facility], [address] [place], [place], [address] [place] [address]          Discharge Medication List as of 7/31/2022 11:48 AM      START taking these medications    Details   metoprolol tartrate (LOPRESSOR) 25 mg tablet Take 0 5 tablets (12 5 mg total) by mouth every 12 (twelve) hours, Starting Sun 7/31/2022, Until Tue 8/30/2022, Normal         CONTINUE these medications which have NOT CHANGED    Details   Cholecalciferol (VITAMIN D) 2000 units CAPS Take 1 capsule by mouth daily in the early morning, Historical Med      Fexofenadine HCl (ALLEGRA ALLERGY PO) Take by mouth, Historical Med      fluticasone (FLONASE) 50 mcg/act nasal spray 1 spray into each nostril daily, Historical Med      multivitamin (THERAGRAN) TABS Take 1 tablet by mouth daily in the early morning, Historical Med               PDMP Review     None           ED Provider  Attending physically available and evaluated Power Huntley I managed the patient along with the ED Attending      Electronically Signed by         Herrera Núñez DO  08/05/22 0900

## 2022-07-30 NOTE — ED ATTENDING ATTESTATION
7/30/2022  IMarisa MD, saw and evaluated the patient  I have discussed the patient with the resident/non-physician practitioner and agree with the resident's/non-physician practitioner's findings, Plan of Care, and MDM as documented in the resident's/non-physician practitioner's note, except where noted  All available labs and Radiology studies were reviewed  I was present for key portions of any procedure(s) performed by the resident/non-physician practitioner and I was immediately available to provide assistance  At this point I agree with the current assessment done in the Emergency Department  I have conducted an independent evaluation of this patient a history and physical is as follows:    38-year-old male presenting for evaluation of lightheadedness and sensation of his heart racing that started while playing golf this afternoon  Lasted for about an hour and 45 minutes  His heart rate was between 160 and 170 on his apple watch and a nurse practitioner who felt his pulse on the golf course stated that it was irregular  He had a similar 5 minute episode last week  No chest pain or shortness of breath  Symptoms have since completely resolved and he is asymptomatic at the time of presentation  Physical Exam  Constitutional:       General: He is not in acute distress  Appearance: He is well-developed  He is not diaphoretic  HENT:      Head: Normocephalic and atraumatic  Right Ear: External ear normal       Left Ear: External ear normal       Nose: Nose normal    Eyes:      Conjunctiva/sclera: Conjunctivae normal    Cardiovascular:      Rate and Rhythm: Normal rate and regular rhythm  Pulses: Normal pulses  Heart sounds: Normal heart sounds  No murmur heard  No friction rub  No gallop  Pulmonary:      Effort: Pulmonary effort is normal  No respiratory distress  Breath sounds: Normal breath sounds  No wheezing or rales     Abdominal:      General: Bowel sounds are normal  There is no distension  Palpations: Abdomen is soft  Tenderness: There is no abdominal tenderness  There is no guarding  Musculoskeletal:         General: No deformity  Normal range of motion  Cervical back: Normal range of motion and neck supple  Right lower leg: No edema  Left lower leg: No edema  Comments: No calf swelling or tenderness   Skin:     General: Skin is warm and dry  Neurological:      Mental Status: He is alert and oriented to person, place, and time  Motor: No abnormal muscle tone  Psychiatric:         Mood and Affect: Mood normal            ED Course  ED Course as of 07/31/22 0132   Sat Jul 30, 2022   1514 I personally interpreted the pt's EKG which reveals normal rate, normal sinus rhythm, normal axis, normal intervals, T wave inversion in lead III unchanged from prior, no ST segment deviation  1707 Initial troponin 77  In the absence of CP suspect elevation in the setting of demand from tachyarrhythmia earlier in the day rather than ACS, especially as the patient is now completely asymptomatic  Will check delta troponin  Repeat troponin up trended to 90  Still suspect that troponin elevation is in the setting of demand rather than ACS but will admit to slim for further evaluation      Critical Care Time  Procedures

## 2022-07-31 ENCOUNTER — HOSPITAL ENCOUNTER (EMERGENCY)
Facility: HOSPITAL | Age: 57
Discharge: HOME | End: 2022-07-31
Attending: EMERGENCY MEDICINE | Admitting: EMERGENCY MEDICINE
Payer: COMMERCIAL

## 2022-07-31 VITALS
SYSTOLIC BLOOD PRESSURE: 119 MMHG | HEART RATE: 60 BPM | TEMPERATURE: 97.7 F | HEIGHT: 71 IN | RESPIRATION RATE: 18 BRPM | OXYGEN SATURATION: 97 % | WEIGHT: 240 LBS | DIASTOLIC BLOOD PRESSURE: 84 MMHG | BODY MASS INDEX: 33.6 KG/M2

## 2022-07-31 VITALS
RESPIRATION RATE: 18 BRPM | DIASTOLIC BLOOD PRESSURE: 81 MMHG | TEMPERATURE: 97.7 F | HEART RATE: 55 BPM | SYSTOLIC BLOOD PRESSURE: 130 MMHG | OXYGEN SATURATION: 97 %

## 2022-07-31 DIAGNOSIS — R07.9 CHEST PAIN: Primary | ICD-10-CM

## 2022-07-31 LAB
2HR DELTA HS TROPONIN: -2 NG/L
ALBUMIN SERPL BCP-MCNC: 4.3 G/DL (ref 3.5–5)
ALP SERPL-CCNC: 45 U/L (ref 34–104)
ALT SERPL W P-5'-P-CCNC: 17 U/L (ref 7–52)
ANION GAP SERPL CALCULATED.3IONS-SCNC: 6 MMOL/L (ref 4–13)
AST SERPL W P-5'-P-CCNC: 20 U/L (ref 13–39)
ATRIAL RATE: 66 BPM
ATRIAL RATE: 90 BPM
BASOPHILS # BLD AUTO: 0.03 THOUSANDS/ΜL (ref 0–0.1)
BASOPHILS NFR BLD AUTO: 0 % (ref 0–1)
BILIRUB SERPL-MCNC: 0.55 MG/DL (ref 0.2–1)
BUN SERPL-MCNC: 17 MG/DL (ref 5–25)
CALCIUM SERPL-MCNC: 9.4 MG/DL (ref 8.4–10.2)
CARDIAC TROPONIN I PNL SERPL HS: 25 NG/L
CARDIAC TROPONIN I PNL SERPL HS: 27 NG/L
CHLORIDE SERPL-SCNC: 103 MMOL/L (ref 96–108)
CO2 SERPL-SCNC: 29 MMOL/L (ref 21–32)
CREAT SERPL-MCNC: 0.91 MG/DL (ref 0.6–1.3)
EOSINOPHIL # BLD AUTO: 0.1 THOUSAND/ΜL (ref 0–0.61)
EOSINOPHIL NFR BLD AUTO: 1 % (ref 0–6)
ERYTHROCYTE [DISTWIDTH] IN BLOOD BY AUTOMATED COUNT: 12.8 % (ref 11.6–15.1)
GFR SERPL CREATININE-BSD FRML MDRD: 91 ML/MIN/1.73SQ M
GLUCOSE SERPL-MCNC: 98 MG/DL (ref 65–140)
HCT VFR BLD AUTO: 41.9 % (ref 36.5–49.3)
HGB BLD-MCNC: 14.6 G/DL (ref 12–17)
IMM GRANULOCYTES # BLD AUTO: 0.02 THOUSAND/UL (ref 0–0.2)
IMM GRANULOCYTES NFR BLD AUTO: 0 % (ref 0–2)
LYMPHOCYTES # BLD AUTO: 2.31 THOUSANDS/ΜL (ref 0.6–4.47)
LYMPHOCYTES NFR BLD AUTO: 33 % (ref 14–44)
MAGNESIUM SERPL-MCNC: 2 MG/DL (ref 1.9–2.7)
MCH RBC QN AUTO: 30.2 PG (ref 26.8–34.3)
MCHC RBC AUTO-ENTMCNC: 34.8 G/DL (ref 31.4–37.4)
MCV RBC AUTO: 87 FL (ref 82–98)
MONOCYTES # BLD AUTO: 0.52 THOUSAND/ΜL (ref 0.17–1.22)
MONOCYTES NFR BLD AUTO: 8 % (ref 4–12)
NEUTROPHILS # BLD AUTO: 3.96 THOUSANDS/ΜL (ref 1.85–7.62)
NEUTS SEG NFR BLD AUTO: 58 % (ref 43–75)
NRBC BLD AUTO-RTO: 0 /100 WBCS
P AXIS: 27 DEGREES
P AXIS: 42 DEGREES
PLATELET # BLD AUTO: 219 THOUSANDS/UL (ref 149–390)
PMV BLD AUTO: 10 FL (ref 8.9–12.7)
POTASSIUM SERPL-SCNC: 4.3 MMOL/L (ref 3.5–5.3)
PR INTERVAL: 166 MS
PR INTERVAL: 196 MS
PROT SERPL-MCNC: 6.7 G/DL (ref 6.4–8.4)
QRS AXIS: 30 DEGREES
QRS AXIS: 43 DEGREES
QRSD INTERVAL: 80 MS
QRSD INTERVAL: 82 MS
QT INTERVAL: 336 MS
QT INTERVAL: 400 MS
QTC INTERVAL: 405 MS
QTC INTERVAL: 413 MS
RBC # BLD AUTO: 4.84 MILLION/UL (ref 3.88–5.62)
SODIUM SERPL-SCNC: 138 MMOL/L (ref 135–147)
T WAVE AXIS: -7 DEGREES
T WAVE AXIS: -9 DEGREES
TSH SERPL DL<=0.05 MIU/L-ACNC: 4.61 UIU/ML (ref 0.45–4.5)
VENTRICULAR RATE: 64 BPM
VENTRICULAR RATE: 87 BPM
WBC # BLD AUTO: 6.94 THOUSAND/UL (ref 4.31–10.16)

## 2022-07-31 PROCEDURE — 36415 COLL VENOUS BLD VENIPUNCTURE: CPT | Performed by: EMERGENCY MEDICINE

## 2022-07-31 PROCEDURE — 99217 PR OBSERVATION CARE DISCHARGE MANAGEMENT: CPT | Performed by: INTERNAL MEDICINE

## 2022-07-31 PROCEDURE — 80053 COMPREHEN METABOLIC PANEL: CPT | Performed by: EMERGENCY MEDICINE

## 2022-07-31 PROCEDURE — 93010 ELECTROCARDIOGRAM REPORT: CPT | Performed by: INTERNAL MEDICINE

## 2022-07-31 PROCEDURE — 93005 ELECTROCARDIOGRAM TRACING: CPT

## 2022-07-31 PROCEDURE — 84443 ASSAY THYROID STIM HORMONE: CPT | Performed by: PHYSICIAN ASSISTANT

## 2022-07-31 PROCEDURE — 99284 EMERGENCY DEPT VISIT MOD MDM: CPT

## 2022-07-31 PROCEDURE — 83735 ASSAY OF MAGNESIUM: CPT | Performed by: EMERGENCY MEDICINE

## 2022-07-31 PROCEDURE — 85025 COMPLETE CBC W/AUTO DIFF WBC: CPT | Performed by: EMERGENCY MEDICINE

## 2022-07-31 PROCEDURE — 84484 ASSAY OF TROPONIN QUANT: CPT | Performed by: EMERGENCY MEDICINE

## 2022-07-31 PROCEDURE — 99285 EMERGENCY DEPT VISIT HI MDM: CPT | Performed by: EMERGENCY MEDICINE

## 2022-07-31 RX ORDER — METOPROLOL SUCCINATE 25 MG/1
25 TABLET, EXTENDED RELEASE ORAL DAILY
Qty: 30 TABLET | Refills: 0 | Status: CANCELLED | OUTPATIENT
Start: 2022-07-31 | End: 2022-08-30

## 2022-07-31 RX ORDER — METOPROLOL SUCCINATE 25 MG/1
25 TABLET, EXTENDED RELEASE ORAL DAILY
Status: DISCONTINUED | OUTPATIENT
Start: 2022-07-31 | End: 2022-07-31 | Stop reason: HOSPADM

## 2022-07-31 RX ADMIN — Medication 1000 UNITS: at 08:30

## 2022-07-31 RX ADMIN — B-COMPLEX W/ C & FOLIC ACID TAB 1 TABLET: TAB at 08:30

## 2022-07-31 RX ADMIN — METOPROLOL SUCCINATE 25 MG: 25 TABLET, EXTENDED RELEASE ORAL at 10:19

## 2022-07-31 NOTE — ASSESSMENT & PLAN NOTE
· Initial troponin elevated to 77 with repeat troponin of 90, 82  · In the setting of palpitations, lightheadedness with suspected arrhythmia/ tachycardia  · EKG without ischemic changes  · Monitor on telemetry and notify of any arrhythmias       Plan:  · outpatient follow-up with Cardiology/electrophysiology

## 2022-07-31 NOTE — DISCHARGE INSTRUCTIONS
Please make sure to follow-up with cardiologist as soon as possible    Return to ER for any new or worsening symptoms including chest pain, trouble breathing, pass out or feeling worse overall

## 2022-07-31 NOTE — ASSESSMENT & PLAN NOTE
· Initial troponin elevated to 77 with repeat troponin of 90, 82  · In the setting of palpitations, lightheadedness with suspected arrhythmia/ tachycardia  · EKG without ischemic changes  · Monitor on telemetry and notify of any arrhythmias  · Consider cardiology consult vs outpatient follow-up

## 2022-07-31 NOTE — ASSESSMENT & PLAN NOTE
· Presented with palpitations and lightheadedness  Prior to presentation, patient noted that his HR was in the 160-170s and his pulse was also reportedly irregular  · R/o arrhythmia  Currently in since rhythm  · Patient had an outpatient cardiac work-up 1 year ago with stress echo negative for ischemia, EF 60% but noted supraventricular tachycardia with peak exercise, therefore, had a Zio Patch which revealed several short runs of SVT  · EKG without ischemic changes  · Troponin elevation noted  · Mag 1 9 and K 4 2 on presentation  · Check TSH level  · Continue to monitor on telemetry  · Outpatient follow-up with cardiology/ EP vs inpatient cardiology consult if any events noted on telemetry

## 2022-07-31 NOTE — H&P
[facility]  H&P- Mahlon Koyanagi 1965, 59 y o  male MRN: [id number]  Unit/Bed#: S -01 Encounter: [id number]  Primary Care Provider: Ekaterina Conde DO   Date and time admitted to hospital: 7/30/2022  2:53 PM    * Palpitations  Assessment & Plan  · Presented with palpitations and lightheadedness  Prior to presentation, patient noted that his HR was in the 160-170s and his pulse was also reportedly irregular  · R/o arrhythmia  Currently in since rhythm  · Patient had an outpatient cardiac work-up 1 year ago with stress echo negative for ischemia, EF 60% but noted supraventricular tachycardia with peak exercise, therefore, had a Zio Patch which revealed several short runs of SVT  · EKG without ischemic changes  · Troponin elevation noted  · Mag 1 9 and K 4 2 on presentation  · Check TSH level  · Continue to monitor on telemetry  · Outpatient follow-up with cardiology/ EP vs inpatient cardiology consult if any events noted on telemetry  Elevated troponin  Assessment & Plan  · Initial troponin elevated to 77 with repeat troponin of 90, 82  · In the setting of palpitations, lightheadedness with suspected arrhythmia/ tachycardia  · EKG without ischemic changes  · Monitor on telemetry and notify of any arrhythmias  · Consider cardiology consult vs outpatient follow-up  VTE Pharmacologic Prophylaxis: VTE Score: 2 Low Risk (Score 0-2) - Encourage Ambulation  Code Status: level 1 - full code  Discussion with family: Patient declined call to   Anticipated Length of Stay: Patient will be admitted on an observation basis with an anticipated length of stay of less than 2 midnights secondary to palpitations, elevated troponin  Total Time for Visit, including Counseling / Coordination of Care: 60 minutes Greater than 50% of this total time spent on direct patient counseling and coordination of care      Chief Complaint: palpitations    History of Present Illness:  Shavon Wheeler is a 59 y o  male with a PMH of  who presents with lightheadedness and palpitations  Patient reports that around 1030am this morning he was outside golfing when he began to feel lightheaded/ off balance  He notes that he continued to golf but his symptoms were persistent and around 11:30am he noticed that his heart was racing and his HR on his watch was up to 169  He reports that a NP at the golf course checked his pulse and noted it to be fast and irregular  He reports a similar, intermittent episodes of lightheadedness, palpitations for the past 2 years but notes that prior episodes have not lasted as long as today's episode  He notes that he had similar symptoms about 3-4 days ago but his symptoms resolved after a few minutes  He has attributed prior events to being dehydrated at times but notes that today he thought he was drinking enough fluid and denies any caffeine intake prior to onset of symptoms today  He notes that he had golfed 36-holes yesterday as well so was outside in the heat the past 2 days  He reports that he has otherwise been in his usual state of health recently and denies chest pain, SOB, cough, fevers/ chills  He denies nausea/ vomiting, diarrhea and denies change in appetite  Review of Systems:  Review of Systems   Constitutional: Negative for appetite change, chills and fever  Respiratory: Negative for cough and shortness of breath  Cardiovascular: Positive for palpitations  Negative for chest pain and leg swelling  Gastrointestinal: Negative for diarrhea, nausea and vomiting  Neurological: Positive for light-headedness  Negative for syncope  All other systems reviewed and are negative        Past Medical and Surgical History:   Past Medical History:   Diagnosis Date    Arthritis     r foot    Infectious diarrhea     LAST ASSESSED: [id number]    Overweight     LAST ASSESSED: [id number]    Panic attacks     last one 4-5 months ago    Paresthesias/numbness     LAST ASSESSED: [id number]    Prediabetes     LAST ASSESSED: [id number]    Prehypertension     LAST ASSESSED: [id number]    Tinea versicolor     LAST ASSESSED: [id number]    Varicose vein of leg     r leg    Vitamin D deficiency     LAST ASSESSED: [id number]    Wears glasses        Past Surgical History:   Procedure Laterality Date    APPENDECTOMY      LAST ASSESSED: [id number]    CHEILECTOMY Right 12/1/2017    Procedure: CHEILECTOMY;  Surgeon: Jung Maradiaga DPM;  Location: [facility];  Service: Podiatry    HIP PINNING Left     as a child    HIP SURGERY      LAST ASSESSED: [id number]       Meds/Allergies:  Prior to Admission medications    Medication Sig Start Date End Date Taking? Authorizing Provider   Cholecalciferol (VITAMIN D) 2000 units CAPS Take 1 capsule by mouth daily in the early morning   Yes Historical Provider, MD   Fexofenadine HCl (ALLEGRA ALLERGY PO) Take by mouth   Yes Historical Provider, MD   fluticasone (FLONASE) 50 mcg/act nasal spray 1 spray into each nostril daily   Yes Historical Provider, MD   multivitamin (THERAGRAN) TABS Take 1 tablet by mouth daily in the early morning   Yes Historical Provider, MD   Turmeric 400 MG CAPS Take by mouth as needed  Patient not taking: No sig reported  7/30/22  Historical Provider, MD     I have reviewed home medications with a medical source (PCP, Pharmacy, other)  Allergies:    Allergies   Allergen Reactions    Seasonal Ic [Cholestatin]        Social History:  Marital Status: /Civil Union   Occupation:   Patient Pre-hospital Living Situation: Home, With spouse  Patient Pre-hospital Level of Mobility: walks  Patient Pre-hospital Diet Restrictions:   Substance Use History:   Social History     Substance and Sexual Activity   Alcohol Use Yes    Alcohol/week: 12 0 standard drinks    Types: 12 Cans of beer per week    Comment: (HISTORY), SOCIAL     Social History     Tobacco Use   Smoking Status Never Smoker   Smokeless Tobacco Never Used     Social History     Substance and Sexual Activity   Drug Use No       Family History:  Family History   Problem Relation Age of Onset    Diabetes Mother     Hypertension Mother     Prostate cancer Father     Heart attack Father     Diabetes Paternal Aunt        Physical Exam:     Vitals:   Blood Pressure: 143/85 (07/30/22 2034)  Pulse: 73 (07/30/22 2034)  Temperature: 98 8 °F (37 1 °C) (07/30/22 2034)  Temp Source: Oral (07/30/22 1452)  Respirations: 18 (07/30/22 1900)  Height: 5' 11" (180 3 cm) (07/30/22 1452)  Weight - Scale: 109 kg (240 lb) (07/30/22 1452)  SpO2: 96 % (07/30/22 2034)    Physical Exam  Vitals and nursing note reviewed  Constitutional:       General: He is not in acute distress  Appearance: He is not ill-appearing or diaphoretic  HENT:      Head: Normocephalic and atraumatic  Eyes:      Conjunctiva/sclera: Conjunctivae normal    Cardiovascular:      Rate and Rhythm: Normal rate and regular rhythm  Heart sounds: No murmur heard  Pulmonary:      Effort: Pulmonary effort is normal  No respiratory distress  Breath sounds: Normal breath sounds  Abdominal:      Palpations: Abdomen is soft  Tenderness: There is no abdominal tenderness  Musculoskeletal:      Right lower leg: No edema  Left lower leg: No edema  Skin:     General: Skin is warm and dry  Coloration: Skin is not pale  Neurological:      Mental Status: He is alert and oriented to person, place, and time     Psychiatric:         Mood and Affect: Mood normal          Behavior: Behavior normal           Additional Data:     Lab Results:  Results from last 7 days   Lab Units 07/30/22  1617   WBC Thousand/uL 9 99   HEMOGLOBIN g/dL 13 9   HEMATOCRIT % 40 1   PLATELETS Thousands/uL 229   NEUTROS PCT % 68   LYMPHS PCT % 24   MONOS PCT % 7   EOS PCT % 1     Results from last 7 days   Lab Units 07/30/22  1617   SODIUM mmol/L 135   POTASSIUM mmol/L 4 2   CHLORIDE mmol/L 101   CO2 mmol/L 29   BUN mg/dL 18   CREATININE mg/dL 0 93   ANION GAP mmol/L 5   CALCIUM mg/dL 9 1   ALBUMIN g/dL 4 2   TOTAL BILIRUBIN mg/dL 0 72   ALK PHOS U/L 46   ALT U/L 18   AST U/L 24   GLUCOSE RANDOM mg/dL 103                       Imaging: Personally reviewed the following imaging: chest xray  XR chest 1 view portable   ED Interpretation by Albaro Phelps DO (07/30 1546)   No obvious cardiopulmonary findings  EKG and Other Studies Reviewed on Admission:   · EKG: NSR  HR 87     ** Please Note: This note has been constructed using a voice recognition system   **

## 2022-07-31 NOTE — DISCHARGE INSTR - AVS FIRST PAGE
Dear Mary Jo Gonzalez,     It was our pleasure to care for you here at [facility], [facility]  It is our hope that we were always able to exceed the expected standards for your care during your stay  You were hospitalized due to heart palpitations  You were cared for on the 3rd floor by Lay Daugherty MD under the service of Carrie Kraft MD with the [facility] who covers for your primary care physician (PCP), Teddy Ruiz DO, while you were hospitalized  If you have any questions or concerns related to this hospitalization, you may contact us at [phone]  For follow up as well as any medication refills, we recommend that you follow up with your primary care physician  A registered nurse will reach out to you by phone within a few days after your discharge to answer any additional questions that you may have after going home  However, at this time we provide for you here, the most important instructions / recommendations at discharge:     Notable Medication Adjustments -   Please start taking Metoprolol tartrate (Lopressor), one half of a tablet every 12 hours  You can not take the evening dose if your heart rate is too low, as we discussed  Testing Required after Discharge -   None  Important follow up information -   Please follow up with your primary care doctor in 1 week  Please follow up with the electrophysiology doctor in about 2 weeks  Other Instructions -   When you feel your heart increase or have fluttering in your chest, please try the valsalva maneuver that we discussed, and is listed in the attached instructions  Please review this entire after visit summary as additional general instructions including medication list, appointments, activity, diet, any pertinent wound care, and other additional recommendations from your care team that may be provided for you        Sincerely,     Lay Daugherty MD

## 2022-07-31 NOTE — DISCHARGE SUMMARY
[facility]  Discharge- Shanae Dueñas 1965, 59 y o  male MRN: [id number]  Unit/Bed#: S -01 Encounter: [id number]  Primary Care Provider: Duyen Vang DO   Date and time admitted to hospital: 7/30/2022  2:53 PM    Elevated troponin  Assessment & Plan  · Initial troponin elevated to 77 with repeat troponin of 90, 82  · In the setting of palpitations, lightheadedness with suspected arrhythmia/ tachycardia  · EKG without ischemic changes  · Monitor on telemetry and notify of any arrhythmias  Plan:  · outpatient follow-up with Cardiology/electrophysiology    * Palpitations  Assessment & Plan  · Presented with palpitations and lightheadedness  Prior to presentation, patient noted that his HR was in the 160-170s and his pulse was also reportedly irregular  · R/o arrhythmia  Currently in since rhythm  · Patient had an outpatient cardiac work-up 1 year ago with stress echo negative for ischemia, EF 60% but noted supraventricular tachycardia with peak exercise, therefore, had a Zio Patch which revealed several short runs of SVT  · EKG without ischemic changes  · Troponin elevation noted  · Mag 1 9 and K 4 2 on presentation  · Check TSH level  · Telemetry demonstrated no events on 07/31    Plan:  · Outpatient follow-up with cardiology/ EP  · Started metoprolol tartrate 12 5 mg q12h, continuing as an outpatient  · Instructed and Valsalva maneuver for termination of AFib            Medical Problems             Resolved Problems  Date Reviewed: 7/30/2022   None               Discharging Resident: Benjamin Mello MD  Discharging Attending: Tara Coker MD  PCP: Duyen Vang DO  Admission Date:   Admission Orders (From admission, onward)     Ordered        07/30/22 1925  Place in Observation  Once                      Discharge Date: 07/31/22    Consultations During Hospital Stay:  · None    Procedures Performed:   · None    Significant Findings / Test Results:   XR chest 1 view portable  Result Date: 7/31/2022  Impression: No acute cardiopulmonary disease  Incidental Findings:   · None    Test Results Pending at Discharge (will require follow up): · None     Outpatient Tests Requested:  · None    Complications:  None    Reason for Admission:     Hospital Course:   Dalton Grace is a 59 y o  male patient who originally presented to the hospital on 7/30/2022 due to lightheadedness and palpitations which he experienced on 07/30 on the morning when he was out golfing  He describes himself as feeling lightheaded or off balance, and he than as noted that his smart watch read 169 beats per minute for his heart rate  He has had similar episodes before for the past 2-3 years he decided to go to the emergency department after a nurse practitioner who happened to be at the golf course told him that his heart rate was irregular and rapid  Upon presentation to the emergency department, he was evaluated for his cardiac syndrome  His EKG did not show ischemic changes, and had normal sinus rhythm  His troponin levels were measured at 77, 90, and 82 at 0, 2, 4 hours, likely secondary to atrial fibrillation or atrial fibrillation with RVR prior to hospital admission  His CBC and CMP were within normal limits  Yes edit chest x-ray, which showed no acute findings  His TSH was noted to be high (measured twice, 4 631, 4 607) but his T4 level was 0 99  He had telemetry, which demonstrated normal sinus rhythm  In short, his the palpitations were not in the present while in the hospital, however he was suspected to be having atrial fibrillation or atrial fibrillation with RVR while he was golfing based on his symptoms  He was prescribed these were 12 5 mg of metoprolol tartrate to be taken twice daily every 12 hours to help control his heart rate  He was also instructed to perform a Valsalva maneuver if he has the symptoms again    He was recommended to follow-up with electrophysiology in the cardiology offices, as well as his primary care physician  Please see above list of diagnoses and related plan for additional information  Condition at Discharge: good    Discharge Day Visit / Exam:   Subjective:  No acute events overnight  Patient continues to be feeling well, in his usual state of health  He has not experienced any palpitations while he was in the hospital   He is not experiencing lightheadedness while in the hospital   He denies any shortness on breath, chest pain, abdominal pain, or nausea  Vitals: Blood Pressure: 119/84 (07/31/22 0806)  Pulse: 60 (07/31/22 0806)  Temperature: 97 7 °F (36 5 °C) (07/31/22 0806)  Temp Source: Oral (07/30/22 1452)  Respirations: 18 (07/31/22 0806)  Height: 5' 11" (180 3 cm) (07/30/22 1452)  Weight - Scale: 109 kg (240 lb) (07/30/22 1452)  SpO2: 97 % (07/31/22 0806)  Exam:   Physical Exam  Constitutional:       General: He is not in acute distress  Appearance: Normal appearance  He is not ill-appearing  HENT:      Head: Normocephalic and atraumatic  Nose: Nose normal  No rhinorrhea  Mouth/Throat:      Mouth: Mucous membranes are moist    Eyes:      General: No scleral icterus  Conjunctiva/sclera: Conjunctivae normal       Pupils: Pupils are equal, round, and reactive to light  Cardiovascular:      Rate and Rhythm: Normal rate and regular rhythm  Pulses: Normal pulses  Heart sounds: Normal heart sounds  No murmur heard  Pulmonary:      Effort: Pulmonary effort is normal       Breath sounds: Normal breath sounds  No wheezing or rales  Abdominal:      General: Abdomen is flat  Bowel sounds are normal       Palpations: Abdomen is soft  Musculoskeletal:         General: No swelling  Cervical back: No rigidity or tenderness  Right lower leg: No edema  Left lower leg: No edema  Neurological:      Mental Status: He is alert            Discussion with Family: Updated  (daughter) via phone  Discharge instructions/Information to patient and family:   See after visit summary for information provided to patient and family  Provisions for Follow-Up Care:  See after visit summary for information related to follow-up care and any pertinent home health orders  Disposition:   Home    Planned Readmission:  No    Discharge Medications:  See after visit summary for reconciled discharge medications provided to patient and/or family        **Please Note: This note may have been constructed using a voice recognition system**

## 2022-07-31 NOTE — UTILIZATION REVIEW
Initial Clinical Review    Admission: Date/Time/Statement:   Admission Orders (From admission, onward)     Ordered        07/30/22 1925  Place in Observation  Once                      Orders Placed This Encounter   Procedures    Place in Observation     Standing Status:   Standing     Number of Occurrences:   1     Order Specific Question:   Level of Care     Answer:   Med Surg [16]     ED Arrival Information     Expected   -    Arrival   7/30/2022 14:20    Acuity   Urgent            Means of arrival   Walk-In    Escorted by   Self    Service   Hospitalist    Admission type   Urgent            Arrival complaint   Rapid Heart rate           Chief Complaint   Patient presents with    Palpitations     Patient reports 10min episode of palpitations and "heart racing" (watch reading 160-170bpm)  Lightheadedness and dizziness at time of palpitations  Reports sensation has now resolved  Denies CP/SOB/HA at present  (  Initial Presentation: 59 y o  male with hx panic attacks, SVT on stress echo and ZIO , who presents to ED from home with lightheadedness and palpitations, noted heart to be racing while golfingtoday(rate 169 per watch ), pulse fast and irreg  Pt has had similar episodes in past that didn't last as long with last episode 3-4 days ago before today's episode   On exam, normal heart rate, rhythm  ECG NSR @87, no ischemic changes   CXR shows nothing acute  Labs -troponin elevated 77-->90-->82, Mag 1 9, K-WNL   Pt admittted as OBS to telemetry with palpitations, elevated troponin  PLan - telemetry, check TSH  Consdider cardio consult vs outpt f/u w/ cardiology       Date: 7/31  TSH 4 607    ED Triage Vitals   Temperature Pulse Respirations Blood Pressure SpO2   07/30/22 1436 07/30/22 1436 07/30/22 1436 07/30/22 1436 07/30/22 1436   98 2 °F (36 8 °C) 103 18 168/64 96 %      Temp Source Heart Rate Source Patient Position - Orthostatic VS BP Location FiO2 (%)   07/30/22 1436 07/30/22 1436 07/30/22 1436 07/30/22 1436 --   Oral Monitor Sitting Left arm       Pain Score       07/30/22 1452       No Pain          Wt Readings from Last 1 Encounters:   07/30/22 109 kg (240 lb)   Additional Vital Signs:   Date/Time Temp Pulse Resp BP MAP (mmHg) SpO2   07/30/22 20:34:20 98 8 °F (37 1 °C) 73 -- 143/85 104 96 %   07/30/22 1900 -- 72 18 123/73 93 98 %   07/30/22 1630 -- 70 18 117/78 90 96 %   07/30/22 1452 98 3 °F (36 8 °C) 90 18 130/67 -- 97 %         Pertinent Labs/Diagnostic Test Results:   7/30 ECG-Interpretation: non-specific    Rate:     ECG rate:  87    ECG rate assessment: normal    Rhythm:     Rhythm: sinus rhythm    Ectopy:     Ectopy: none    QRS:     QRS axis:  Normal    QRS intervals:  Normal  Conduction:     Conduction: normal    ST segments:     ST segments:  Normal  T waves:     T waves: inverted      Inverted:  III and aVF  XR chest 1 view portable   ED Interpretation by Sarah Traore DO (07/30 1546)   No obvious cardiopulmonary findings              Results from last 7 days   Lab Units 07/30/22  1617   WBC Thousand/uL 9 99   HEMOGLOBIN g/dL 13 9   HEMATOCRIT % 40 1   PLATELETS Thousands/uL 229   NEUTROS ABS Thousands/µL 6 77         Results from last 7 days   Lab Units 07/30/22  1617   SODIUM mmol/L 135   POTASSIUM mmol/L 4 2   CHLORIDE mmol/L 101   CO2 mmol/L 29   ANION GAP mmol/L 5   BUN mg/dL 18   CREATININE mg/dL 0 93   EGFR ml/min/1 73sq m 89   CALCIUM mg/dL 9 1   MAGNESIUM mg/dL 1 9     Results from last 7 days   Lab Units 07/30/22  1617   AST U/L 24   ALT U/L 18   ALK PHOS U/L 46   TOTAL PROTEIN g/dL 6 5   ALBUMIN g/dL 4 2   TOTAL BILIRUBIN mg/dL 0 72         Results from last 7 days   Lab Units 07/30/22  1617   GLUCOSE RANDOM mg/dL 103               Results from last 7 days   Lab Units 07/30/22  2031 07/30/22  1818 07/30/22  1617   HS TNI 0HR ng/L  --   --  77*   HS TNI 2HR ng/L  --  90*  --    HSTNI D2 ng/L  --  13  --    HS TNI 4HR ng/L 82*  --   --    HSTNI D4 ng/L 5  --   --              Results from last 7 days   Lab Units 07/31/22  0515   TSH 3RD GENERATON uIU/mL 4 607*               ED Treatment:   Medication Administration from 07/30/2022 1419 to 07/30/2022 2017     None        Past Medical History:   Diagnosis Date    Arthritis     r foot    Infectious diarrhea     LAST ASSESSED: [id number]    Overweight     LAST ASSESSED: [id number]    Panic attacks     last one 4-5 months ago    Paresthesias/numbness     LAST ASSESSED: [id number]    Prediabetes     LAST ASSESSED: [id number]    Prehypertension     LAST ASSESSED: [id number]    Tinea versicolor     LAST ASSESSED: [id number]    Varicose vein of leg     r leg    Vitamin D deficiency     LAST ASSESSED: [id number]    Wears glasses      Present on Admission:  **None**      Admitting Diagnosis: Palpitations [R00 2]  Elevated troponin [R77 8]  Age/Sex: 59 y o  male  Admission Orders:  Scheduled Medications:  cholecalciferol, 1,000 Units, Oral, Daily  multivitamin stress formula, 1 tablet, Oral, Daily      Continuous IV Infusions:     PRN Meds:  acetaminophen, 650 mg, Oral, Q6H PRN    telemetry      Network Utilization Review Department  ATTENTION: Please call with any questions or concerns to [phone] and carefully listen to the prompts so that you are directed to the right person  All voicemails are confidential   Julio Alejandre all requests for admission clinical reviews, approved or denied determinations and any other requests to dedicated fax number below belonging to the campus where the patient is receiving treatment   List of dedicated fax numbers for the Facilities:  [address] DENIALS (Administrative/Medical Necessity) [phone]   [address] (Maternity/NICU/Pediatrics) [phone]   [address] [place] [address] [address] 1415 [facility] Evelia Barthel [phone]   501 [facility] [id number] [place] [address] Kate Pleitez [address] [phone]   [place] [address] [phone]

## 2022-07-31 NOTE — ASSESSMENT & PLAN NOTE
· Presented with palpitations and lightheadedness  Prior to presentation, patient noted that his HR was in the 160-170s and his pulse was also reportedly irregular  · R/o arrhythmia  Currently in since rhythm  · Patient had an outpatient cardiac work-up 1 year ago with stress echo negative for ischemia, EF 60% but noted supraventricular tachycardia with peak exercise, therefore, had a Zio Patch which revealed several short runs of SVT  · EKG without ischemic changes  · Troponin elevation noted  · Mag 1 9 and K 4 2 on presentation  · Check TSH level  · Telemetry demonstrated no events on 07/31    Plan:  · Outpatient follow-up with cardiology/ EP  · Started metoprolol tartrate 12 5 mg q12h, continuing as an outpatient  · Instructed and Valsalva maneuver for termination of AFib

## 2022-08-01 ENCOUNTER — CONSULT (OUTPATIENT)
Dept: CARDIOLOGY CLINIC | Facility: CLINIC | Age: 57
End: 2022-08-01
Payer: COMMERCIAL

## 2022-08-01 VITALS
WEIGHT: 240.74 LBS | SYSTOLIC BLOOD PRESSURE: 104 MMHG | HEART RATE: 67 BPM | HEIGHT: 71 IN | BODY MASS INDEX: 33.7 KG/M2 | OXYGEN SATURATION: 98 % | DIASTOLIC BLOOD PRESSURE: 74 MMHG

## 2022-08-01 DIAGNOSIS — R00.2 PALPITATIONS: ICD-10-CM

## 2022-08-01 DIAGNOSIS — R07.9 CHEST PAIN: ICD-10-CM

## 2022-08-01 DIAGNOSIS — I47.1 SVT (SUPRAVENTRICULAR TACHYCARDIA) (HCC): Primary | ICD-10-CM

## 2022-08-01 PROBLEM — I47.10 SVT (SUPRAVENTRICULAR TACHYCARDIA): Status: ACTIVE | Noted: 2022-08-01

## 2022-08-01 PROCEDURE — 99244 OFF/OP CNSLTJ NEW/EST MOD 40: CPT | Performed by: INTERNAL MEDICINE

## 2022-08-01 NOTE — TELEPHONE ENCOUNTER
Patient forgot to mention if he will be getting an automatic refill on the metoprolol tartrate (LOPRESSOR) 25 mg tablet which was given at the hospital? Please advise and call patient with response

## 2022-08-01 NOTE — PROGRESS NOTES
Nildajohn Marco A  1965  [id number]  [facility] [address] [address] [phone]    1  SVT (supraventricular tachycardia) (HCC)     2  Chest pain  Ambulatory Referral to Cardiology   3  Palpitations         Discussion/Summary:  1  Supraventricular tachycardia/AVNRT  2  Atypical chest pain secondary to 1   3  Calcium score 14  4  Preserved LV systolic function  5  Dyslipidemia     Recommendations: We talked about vagal maneuvers for terminating SVT  We talked about treatment strategies including conservative measures which include good hydration with electrolyte containing products, reduction caffeine and alcohol  He has been started on metoprolol 12 5 mg b i d  From the emergency department will give this a try and see how he does tolerating the medications  If he cannot tolerate the beta-blocker or has breakthrough AVNRT will refer to electrophysiology for ablation  He had an extensive workup last year with executive physical including echo and stress echo  We talked about lipid management as well  I will see him back in 6 months  Interval History:  Very pleasant 78-year-old gentleman who presented last year for an 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 as documented in the chart  He has a history of occasional palpitations for many years  During his stress test he went into an AVNRT for about 2 minutes at peak exercise  He says he gets this problem on occasion  He was recently playing in a golf tournament he might have been little bit dehydrated and had a couple alcoholic beverages the night before  He then went into a rapid rhythm with sustained heart rates around 160 according to his Apple watch  He had a sensation of fullness in his chest and he could feel his heart was racing  He then broke spontaneously and heart rate came down to the 120s  But of time he got to the emergency department he was already in sinus tachycardia    Denies any exertional limitations  There has been no anginal sounding discomfort  Denies any shortness of breath, lightheadedness, dizziness, or syncope  There has been no lower extremity edema    Medical Problems             Problem List     Wellness examination    Impaired fasting glucose    Vitamin D deficiency    Knee pain    Palpitations    Elevated troponin    SVT (supraventricular tachycardia) (HCC)              Past Medical History:   Diagnosis Date    Arthritis     r foot    Infectious diarrhea     LAST ASSESSED: [id number]    Overweight     LAST ASSESSED: [id number]    Panic attacks     last one 4-5 months ago    Paresthesias/numbness     LAST ASSESSED: [id number]    Prediabetes     LAST ASSESSED: [id number]    Prehypertension     LAST ASSESSED: [id number]    Tinea versicolor     LAST ASSESSED: [id number]    Varicose vein of leg     r leg    Vitamin D deficiency     LAST ASSESSED: [id number]    Wears glasses      Social History     Socioeconomic History    Marital status: /Civil Union     Spouse name: Not on file    Number of children: Not on file    Years of education: Not on file    Highest education level: Not on file   Occupational History    Not on file   Tobacco Use    Smoking status: Never Smoker    Smokeless tobacco: Never Used   Vaping Use    Vaping Use: Never used   Substance and Sexual Activity    Alcohol use:  Yes     Alcohol/week: 12 0 standard drinks     Types: 12 Cans of beer per week     Comment: (HISTORY), SOCIAL    Drug use: No    Sexual activity: Not on file   Other Topics Concern    Not on file   Social History Narrative    Not on file     Social Determinants of Health     Financial Resource Strain: Not on file   Food Insecurity: Not on file   Transportation Needs: Not on file   Physical Activity: Not on file   Stress: Not on file   Social Connections: Not on file   Intimate Partner Violence: Not on file   Housing Stability: Not on file      Family History   Problem Relation Age of Onset    Diabetes Mother     Hypertension Mother     Prostate cancer Father     Heart attack Father     Diabetes Paternal Aunt      Past Surgical History:   Procedure Laterality Date    APPENDECTOMY      LAST ASSESSED: [id number]    CHEILECTOMY Right 12/1/2017    Procedure: CHEILECTOMY;  Surgeon: Joe Horvath DPM;  Location: [facility];  Service: Podiatry    HIP PINNING Left     as a child    HIP SURGERY      LAST ASSESSED: [id number]       Current Outpatient Medications:     Cholecalciferol (VITAMIN D) 2000 units CAPS, Take 1 capsule by mouth daily in the early morning, Disp: , Rfl:     Fexofenadine HCl (ALLEGRA ALLERGY PO), Take by mouth, Disp: , Rfl:     fluticasone (FLONASE) 50 mcg/act nasal spray, 1 spray into each nostril daily, Disp: , Rfl:     metoprolol tartrate (LOPRESSOR) 25 mg tablet, Take 0 5 tablets (12 5 mg total) by mouth every 12 (twelve) hours, Disp: 30 tablet, Rfl: 0    multivitamin (THERAGRAN) TABS, Take 1 tablet by mouth daily in the early morning, Disp: , Rfl:   Allergies   Allergen Reactions    Seasonal Ic [Cholestatin]        Labs:     Chemistry        Component Value Date/Time     03/22/2017 0917    K 4 3 07/31/2022 1406    K 4 3 03/22/2017 0917     07/31/2022 1406     03/22/2017 0917    CO2 29 07/31/2022 1406    CO2 29 03/22/2017 0917    BUN 17 07/31/2022 1406    BUN 17 03/22/2017 0917    CREATININE 0 91 07/31/2022 1406    CREATININE 0 99 03/22/2017 0917        Component Value Date/Time    CALCIUM 9 4 07/31/2022 1406    CALCIUM 9 2 03/22/2017 0917    ALKPHOS 45 07/31/2022 1406    ALKPHOS 56 03/22/2017 0917    AST 20 07/31/2022 1406    AST 22 03/22/2017 0917    ALT 17 07/31/2022 1406    ALT 21 03/22/2017 0917    BILITOT 0 7 03/22/2017 0917            Lab Results   Component Value Date    CHOL 159 03/22/2017    CHOL 174 12/22/2014     Lab Results   Component Value Date    HDL 44 06/08/2022    HDL 55 06/24/2021    HDL 51 05/14/2020     Lab Results   Component Value Date    LDLCALC 135 (H) 06/08/2022    LDLCALC 121 (H) 06/24/2021    LDLCALC 107 (H) 05/14/2020     Lab Results   Component Value Date    TRIG 71 06/08/2022    TRIG 54 06/24/2021    TRIG 71 05/14/2020     No results found for: CHOLHDL    Imaging: XR chest 1 view portable    Result Date: 7/31/2022  Narrative: CHEST INDICATION:   palpitations  COMPARISON:  Chest radiograph from 6/9/2009, calcium scoring CT from 6/24/2021  EXAM PERFORMED/VIEWS:  XR CHEST PORTABLE FINDINGS: Cardiomediastinal silhouette appears unremarkable  The lungs are clear  No pneumothorax or pleural effusion  Osseous structures appear within normal limits for patient age  Impression: No acute cardiopulmonary disease  Workstation performed: [id number]       ECG:  , PND, orthopnea  Review of Systems   Constitutional: Negative  HENT: Negative  Eyes: Negative  Cardiovascular: Positive for chest pain and palpitations  Respiratory: Negative  Endocrine: Negative  Hematologic/Lymphatic: Negative  Skin: Negative  Musculoskeletal: Negative  Gastrointestinal: Negative  Genitourinary: Negative  Neurological: Negative  Psychiatric/Behavioral: Negative  All other systems reviewed and are negative  Vitals:    08/01/22 1114   BP: 104/74   Pulse: 67   SpO2: 98%     Vitals:    08/01/22 1114   Weight: 109 kg (240 lb 11 9 oz)     Height: 5' 11" (180 3 cm)   Body mass index is 33 58 kg/m²  Physical Exam:  Vital signs reviewed    General appearance:  Appears stated age, alert, well appearing and in no distress  HEENT:  PERRLA, EOMI, no scleral icterus, no conjunctival pallor  NECK:  Supple, No elevated JVP, no thyromegaly, no carotid bruits, no JVD  HEART:  Regular rate and rhythm, normal S1/S2, no S3/S4, no murmur or rub, PMI nondisplaced  LUNGS:  Clear to auscultation bilaterally, no wheezes rales or rhonchi  ABDOMEN:  Soft, non-tender, positive bowel sounds, no rebound or guarding, no organomegaly   EXTREMITIES:  Normal range of motion  No clubbing or cyanosis   No edema  VASCULAR:  Normal pedal pulses   SKIN: No lesions or rashes on exposed skin  NEURO:  CN II-XII intact, no focal deficits

## 2022-08-01 NOTE — ED PROVIDER NOTES
History  Chief Complaint   Patient presents with    Dizziness     Pt reports being admitted yesterday for heart palpitations, d/joanna today and given beta blocker prior to d/c, pt reports feeling dizzy and winded when walking to car       History provided by:  Patient   used: No    Dizziness  Associated symptoms: chest pain    Associated symptoms: no blood in stool, no diarrhea, no headaches, no nausea, no palpitations, no shortness of breath, no vomiting and no weakness      Patient is a 52-year-old male presenting to emergency department due to heart palpitations and chest pain described as pressure  He was admitted yesterday to the hospital due to elevated troponin with palpitations  So to be due to SVT as he has a history of it  Was discharged after given dose of metoprolol today morning, while walking to the car he felt lightheaded, chest pressure, palpitations  Return back to the ER  Currently feels back to normal   States he was given prescription for half a dose of metoprolol to be taking at home compared to what he was given in the hospital due to not feeling well after the initial dose given today morning  MDM will do cardiac evaluation, discuss with Cardiology    Prior to Admission Medications   Prescriptions Last Dose Informant Patient Reported? Taking?    Cholecalciferol (VITAMIN D) 2000 units CAPS   Yes No   Sig: Take 1 capsule by mouth daily in the early morning   Fexofenadine HCl (ALLEGRA ALLERGY PO)   Yes No   Sig: Take by mouth   fluticasone (FLONASE) 50 mcg/act nasal spray   Yes No   Si spray into each nostril daily   metoprolol tartrate (LOPRESSOR) 25 mg tablet   No No   Sig: Take 0 5 tablets (12 5 mg total) by mouth every 12 (twelve) hours   multivitamin (THERAGRAN) TABS   Yes No   Sig: Take 1 tablet by mouth daily in the early morning      Facility-Administered Medications: None       Past Medical History:   Diagnosis Date    Arthritis     r foot    Infectious diarrhea     LAST ASSESSED: [id number]    Overweight     LAST ASSESSED: [id number]    Panic attacks     last one 4-5 months ago    Paresthesias/numbness     LAST ASSESSED: [id number]    Prediabetes     LAST ASSESSED: [id number]    Prehypertension     LAST ASSESSED: [id number]    Tinea versicolor     LAST ASSESSED: [id number]    Varicose vein of leg     r leg    Vitamin D deficiency     LAST ASSESSED: [id number]    Wears glasses        Past Surgical History:   Procedure Laterality Date    APPENDECTOMY      LAST ASSESSED: [id number]    CHEILECTOMY Right 12/1/2017    Procedure: CHEILECTOMY;  Surgeon: Maryellen Velasquez DPM;  [facility]: [facility];  Service: Podiatry    HIP PINNING Left     as a child    HIP SURGERY      LAST ASSESSED: [id number]       Family History   Problem Relation Age of Onset    Diabetes Mother     Hypertension Mother     Prostate cancer Father     Heart attack Father     Diabetes Paternal Aunt      I have reviewed and agree with the history as documented  E-Cigarette/Vaping    E-Cigarette Use Never User      E-Cigarette/Vaping Substances    Nicotine No     THC No     CBD No     Flavoring No     Other No     Unknown No      Social History     Tobacco Use    Smoking status: Never Smoker    Smokeless tobacco: Never Used   Vaping Use    Vaping Use: Never used   Substance Use Topics    Alcohol use: Yes     Alcohol/week: 12 0 standard drinks     Types: 12 Cans of beer per week     Comment: (HISTORY), SOCIAL    Drug use: No       Review of Systems   Constitutional: Negative for chills, diaphoresis and fever  HENT: Negative for congestion and sore throat  Respiratory: Negative for cough, shortness of breath, wheezing and stridor  Cardiovascular: Positive for chest pain  Negative for palpitations and leg swelling  Gastrointestinal: Negative for abdominal pain, blood in stool, diarrhea, nausea and vomiting  Genitourinary: Negative for dysuria, frequency and urgency  Musculoskeletal: Negative for neck stiffness  Skin: Negative for pallor and rash  Neurological: Positive for light-headedness  Negative for dizziness, syncope, weakness and headaches  All other systems reviewed and are negative  Physical Exam  Physical Exam  Vitals reviewed  Constitutional:       Appearance: Normal appearance  He is well-developed  HENT:      Head: Normocephalic and atraumatic  Eyes:      Extraocular Movements: Extraocular movements intact  Pupils: Pupils are equal, round, and reactive to light  Cardiovascular:      Rate and Rhythm: Normal rate and regular rhythm  Heart sounds: Normal heart sounds  Pulmonary:      Effort: Pulmonary effort is normal  No respiratory distress  Breath sounds: Normal breath sounds  Abdominal:      General: Bowel sounds are normal       Palpations: Abdomen is soft  Tenderness: There is no abdominal tenderness  Musculoskeletal:         General: No swelling or tenderness  Normal range of motion  Cervical back: Normal range of motion and neck supple  Skin:     General: Skin is warm and dry  Capillary Refill: Capillary refill takes less than 2 seconds  Neurological:      General: No focal deficit present  Mental Status: He is alert and oriented to person, place, and time           Vital Signs  ED Triage Vitals   Temperature Pulse Respirations Blood Pressure SpO2   07/31/22 1248 07/31/22 1246 07/31/22 1246 07/31/22 1246 07/31/22 1246   97 7 °F (36 5 °C) 76 18 143/85 95 %      Temp Source Heart Rate Source Patient Position - Orthostatic VS BP Location FiO2 (%)   07/31/22 1248 07/31/22 1246 07/31/22 1246 07/31/22 1246 --   Oral Monitor Sitting Left arm       Pain Score       07/31/22 1538       No Pain           Vitals:    07/31/22 1246 07/31/22 1343 07/31/22 1538   BP: 143/85 141/81 130/81   Pulse: 76 60 55   Patient Position - Orthostatic VS: Sitting Sitting Sitting         Visual Acuity      ED Medications  Medications - No data to display    Diagnostic Studies  Results Reviewed     Procedure Component Value Units Date/Time    HS Troponin I 2hr [[id number]]  (Normal) Collected: 07/31/22 1609    Lab Status: Final result Specimen: Blood from Arm, Right Updated: 07/31/22 1654     hs TnI 2hr 25 ng/L      Delta 2hr hsTnI -2 ng/L     HS Troponin 0hr (reflex protocol) [[id number]]  (Normal) Collected: 07/31/22 1406    Lab Status: Final result Specimen: Blood from Arm, Right Updated: 07/31/22 1452     hs TnI 0hr 27 ng/L     Comprehensive metabolic panel [[id number]] Collected: 07/31/22 1406    Lab Status: Final result Specimen: Blood from Arm, Right Updated: 07/31/22 1449     Sodium 138 mmol/L      Potassium 4 3 mmol/L      Chloride 103 mmol/L      CO2 29 mmol/L      ANION GAP 6 mmol/L      BUN 17 mg/dL      Creatinine 0 91 mg/dL      Glucose 98 mg/dL      Calcium 9 4 mg/dL      AST 20 U/L      ALT 17 U/L      Alkaline Phosphatase 45 U/L      Total Protein 6 7 g/dL      Albumin 4 3 g/dL      Total Bilirubin 0 55 mg/dL      eGFR 91 ml/min/1 73sq m     Narrative:      [place] guidelines for Chronic Kidney Disease (CKD):     Stage 1 with normal or high GFR (GFR > 90 mL/min/1 73 square meters)    Stage 2 Mild CKD (GFR = 60-89 mL/min/1 73 square meters)    Stage 3A Moderate CKD (GFR = 45-59 mL/min/1 73 square meters)    Stage 3B Moderate CKD (GFR = 30-44 mL/min/1 73 square meters)    Stage 4 Severe CKD (GFR = 15-29 mL/min/1 73 square meters)    Stage 5 End Stage CKD (GFR <15 mL/min/1 73 square meters)  Note: GFR calculation is accurate only with a steady state creatinine    Magnesium [[id number]]  (Normal) Collected: 07/31/22 1406    Lab Status: Final result Specimen: Blood from Arm, Right Updated: 07/31/22 1449     Magnesium 2 0 mg/dL     CBC and differential [[id number]] Collected: 07/31/22 1406    Lab Status: Final result Specimen: Blood from Arm, Right Updated: 07/31/22 1418     WBC 6 94 Thousand/uL      RBC 4 84 Million/uL      Hemoglobin 14 6 g/dL      Hematocrit 41 9 %      MCV 87 fL      MCH 30 2 pg      MCHC 34 8 g/dL      RDW 12 8 %      MPV 10 0 fL      Platelets 038 Thousands/uL      nRBC 0 /100 WBCs      Neutrophils Relative 58 %      Immat GRANS % 0 %      Lymphocytes Relative 33 %      Monocytes Relative 8 %      Eosinophils Relative 1 %      Basophils Relative 0 %      Neutrophils Absolute 3 96 Thousands/µL      Immature Grans Absolute 0 02 Thousand/uL      Lymphocytes Absolute 2 31 Thousands/µL      Monocytes Absolute 0 52 Thousand/µL      Eosinophils Absolute 0 10 Thousand/µL      Basophils Absolute 0 03 Thousands/µL                  No orders to display              Procedures  Procedures         ED Course  ED Course as of 07/31/22 2230   Sun Jul 31, 2022   1331 ECG shows it was 64, sinus, normal axis, normal QRS, inverted T-wave in lead 3 which is old, biphasic T-wave in lead AVF, this is new from the EKG from yesterday  No ST elevation depressions, normal intervals, independently interpreted by me   1707 Discussed case with Dr Barbee Frankel with cardiology, as long as delta trop is normal pt should follow up as outpatient  No intervention or evaluation needed in the hospital    1708 Pt delta troponin is normal  He is ambulating in the ED without any complaints  Feels back to normal  Will discharge with cardiology follow up  HEART Risk Score    Flowsheet Row Most Recent Value   Heart Score Risk Calculator    History 1 Filed at: 07/31/2022 1708   ECG 0 Filed at: 07/31/2022 1708   Age 1 Filed at: 07/31/2022 1708   Risk Factors 1 Filed at: 07/31/2022 1708   Troponin 1 Filed at: 07/31/2022 1708   HEART Score 4 Filed at: 07/31/2022 1708                                      [facility]    Cardiology believes EKG is without significant changes  As long as delta troponin is normal patient should follow up as outpatient and does not to be admitted per Cardiology      Disposition  Final diagnoses:   Chest pain     Time reflects when diagnosis was documented in both MDM as applicable and the Disposition within this note     Time User Action Codes Description Comment    7/31/2022  5:09 PM Lexx  Add [R07 9] Chest pain       ED Disposition     ED Disposition   Discharge    Condition   Stable    Date/Time   Sun Jul 31, 2022  5:09 PM    Comment   Raul Lowe discharge to home/self care                 Follow-up Information     Follow up With Specialties Details Why Contact Info Additional Information    Lashae Coulter, DO Family Medicine In 3 days Re-evaluation [address] [facility]  [address] [id number]       [address] Emergency Department Emergency Medicine  As needed, If symptoms worsen [address] [facility] [address] [facility] Emergency Department, [address], [place], [place], [address] Cardiology Schedule an appointment as soon as possible for a visit  Please follow-up with cardiologist as soon as possible [address] [phone] [address] Cardiology [address] [facility], [address] [place], [place], [address]          Discharge Medication List as of 7/31/2022  5:10 PM      CONTINUE these medications which have NOT CHANGED    Details   Cholecalciferol (VITAMIN D) 2000 units CAPS Take 1 capsule by mouth daily in the early morning, Historical Med      Fexofenadine HCl (ALLEGRA ALLERGY PO) Take by mouth, Historical Med      fluticasone (FLONASE) 50 mcg/act nasal spray 1 spray into each nostril daily, Historical Med      metoprolol tartrate (LOPRESSOR) 25 mg tablet Take 0 5 tablets (12 5 mg total) by mouth every 12 (twelve) hours, Starting Sun 7/31/2022, Until Tue 8/30/2022, Normal      multivitamin (THERAGRAN) TABS Take 1 tablet by mouth daily in the early morning, Historical Med                 PDMP Review     None          ED Provider  Electronically Signed by           Anastacia Navas MD  07/31/22 3469

## 2022-09-09 ENCOUNTER — APPOINTMENT (OUTPATIENT)
Dept: LAB | Facility: CLINIC | Age: 57
End: 2022-09-09
Payer: COMMERCIAL

## 2022-09-09 DIAGNOSIS — R79.89 ABNORMAL TSH: ICD-10-CM

## 2022-09-09 DIAGNOSIS — R97.20 RISING PSA LEVEL: ICD-10-CM

## 2022-09-09 DIAGNOSIS — E78.5 MILD HYPERLIPIDEMIA: ICD-10-CM

## 2022-09-09 DIAGNOSIS — R73.01 IMPAIRED FASTING GLUCOSE: ICD-10-CM

## 2022-09-09 LAB
ALBUMIN SERPL BCP-MCNC: 3.8 G/DL (ref 3.5–5)
ALP SERPL-CCNC: 61 U/L (ref 46–116)
ALT SERPL W P-5'-P-CCNC: 43 U/L (ref 12–78)
ANION GAP SERPL CALCULATED.3IONS-SCNC: 4 MMOL/L (ref 4–13)
AST SERPL W P-5'-P-CCNC: 27 U/L (ref 5–45)
BILIRUB SERPL-MCNC: 0.68 MG/DL (ref 0.2–1)
BUN SERPL-MCNC: 12 MG/DL (ref 5–25)
CALCIUM SERPL-MCNC: 9.2 MG/DL (ref 8.3–10.1)
CHLORIDE SERPL-SCNC: 105 MMOL/L (ref 96–108)
CHOLEST SERPL-MCNC: 157 MG/DL
CO2 SERPL-SCNC: 29 MMOL/L (ref 21–32)
CREAT SERPL-MCNC: 0.94 MG/DL (ref 0.6–1.3)
EST. AVERAGE GLUCOSE BLD GHB EST-MCNC: 126 MG/DL
GFR SERPL CREATININE-BSD FRML MDRD: 88 ML/MIN/1.73SQ M
GLUCOSE P FAST SERPL-MCNC: 90 MG/DL (ref 65–99)
HBA1C MFR BLD: 6 %
HDLC SERPL-MCNC: 40 MG/DL
LDLC SERPL CALC-MCNC: 95 MG/DL (ref 0–100)
POTASSIUM SERPL-SCNC: 4.1 MMOL/L (ref 3.5–5.3)
PROT SERPL-MCNC: 7.2 G/DL (ref 6.4–8.4)
SODIUM SERPL-SCNC: 138 MMOL/L (ref 135–147)
T3FREE SERPL-MCNC: 3.18 PG/ML (ref 2.3–4.2)
T4 FREE SERPL-MCNC: 1.01 NG/DL (ref 0.76–1.46)
TRIGL SERPL-MCNC: 108 MG/DL
TSH SERPL DL<=0.05 MIU/L-ACNC: 4.57 UIU/ML (ref 0.45–4.5)

## 2022-09-09 PROCEDURE — 84439 ASSAY OF FREE THYROXINE: CPT

## 2022-09-09 PROCEDURE — 84443 ASSAY THYROID STIM HORMONE: CPT

## 2022-09-09 PROCEDURE — 86376 MICROSOMAL ANTIBODY EACH: CPT

## 2022-09-09 PROCEDURE — 83036 HEMOGLOBIN GLYCOSYLATED A1C: CPT

## 2022-09-09 PROCEDURE — 36415 COLL VENOUS BLD VENIPUNCTURE: CPT

## 2022-09-09 PROCEDURE — 80053 COMPREHEN METABOLIC PANEL: CPT

## 2022-09-09 PROCEDURE — 84154 ASSAY OF PSA FREE: CPT

## 2022-09-09 PROCEDURE — 80061 LIPID PANEL: CPT

## 2022-09-09 PROCEDURE — 86800 THYROGLOBULIN ANTIBODY: CPT

## 2022-09-09 PROCEDURE — 84481 FREE ASSAY (FT-3): CPT

## 2022-09-09 PROCEDURE — 84153 ASSAY OF PSA TOTAL: CPT

## 2022-09-10 LAB
THYROGLOB AB SERPL-ACNC: 20.7 IU/ML (ref 0–0.9)
THYROPEROXIDASE AB SERPL-ACNC: 192 IU/ML (ref 0–34)

## 2022-09-12 LAB
PSA FREE MFR SERPL: 22 %
PSA FREE SERPL-MCNC: 0.66 NG/ML
PSA SERPL-MCNC: 3 NG/ML (ref 0–4)

## 2022-09-14 ENCOUNTER — OFFICE VISIT (OUTPATIENT)
Dept: FAMILY MEDICINE CLINIC | Facility: CLINIC | Age: 57
End: 2022-09-14
Payer: COMMERCIAL

## 2022-09-14 VITALS
RESPIRATION RATE: 14 BRPM | HEART RATE: 72 BPM | SYSTOLIC BLOOD PRESSURE: 110 MMHG | DIASTOLIC BLOOD PRESSURE: 84 MMHG | TEMPERATURE: 97.4 F | HEIGHT: 70 IN | BODY MASS INDEX: 33.93 KG/M2 | WEIGHT: 237 LBS | OXYGEN SATURATION: 97 %

## 2022-09-14 DIAGNOSIS — I47.1 SVT (SUPRAVENTRICULAR TACHYCARDIA) (HCC): Primary | ICD-10-CM

## 2022-09-14 DIAGNOSIS — R73.01 IMPAIRED FASTING GLUCOSE: ICD-10-CM

## 2022-09-14 DIAGNOSIS — R97.20 RISING PSA LEVEL: ICD-10-CM

## 2022-09-14 DIAGNOSIS — R79.89 ABNORMAL TSH: ICD-10-CM

## 2022-09-14 DIAGNOSIS — J32.9 RECURRENT SINUSITIS: ICD-10-CM

## 2022-09-14 PROCEDURE — 99214 OFFICE O/P EST MOD 30 MIN: CPT | Performed by: FAMILY MEDICINE

## 2022-09-14 RX ORDER — LEVOTHYROXINE SODIUM 0.03 MG/1
25 TABLET ORAL
Qty: 30 TABLET | Refills: 5 | Status: SHIPPED | OUTPATIENT
Start: 2022-09-14 | End: 2022-10-04 | Stop reason: SDUPTHER

## 2022-09-14 NOTE — PROGRESS NOTES
FAMILY PRACTICE OFFICE VISIT       NAME: Dalton Grace  AGE: 59 y o  SEX: male       : 1965        MRN: [id number]    DATE: 2022  TIME: 11:35 AM    Assessment and Plan   1  SVT (supraventricular tachycardia) (HCC)  Comments:  Pt stable - only on Metoprolol daily right now  Continues f/u with Cardiology  2  Impaired fasting glucose  Comments:  Ronit Catherine is to continue a healthy, lower carb diet, and to remain active  3  Abnormal TSH  Comments:  Pt appears to have early Hashimoto's Disease  Start low dose Leothyroxine  Precautions given  Recheck TSH in approx 2 months  Orders:  -     US thyroid; Future; Expected date: 2022  -     levothyroxine (Euthyrox) 25 mcg tablet; Take 1 tablet (25 mcg total) by mouth daily in the early morning  -     TSH, 3rd generation with Free T4 reflex; Future    4  Rising PSA level  Comments:  Pt with reassuring exam prior, and Free PSA, but PSA rise has been accelerated, and has famhx for prostate cancer - refer to Urology  Orders:  -     Ambulatory Referral to Urology; Future    5  Recurrent sinusitis  Comments:  Referring to ENT  Orders:  -     Ambulatory Referral to Otolaryngology; Future         There are no Patient Instructions on file for this visit  Chief Complaint     Chief Complaint   Patient presents with    Follow-up     Patient being seen for follow up        History of Present Illness   Dalton Grace is a 59y o -year-old male who presents in f/u today  Ronit Catherine had a flare of SVT in 2022 for which he was hospitalized overnight  He has since seen Dr Enrico Charles of Cardiology - they are discussing a possible future ablation  Discussing recent labs - A1c 6 0, TSH 4 59, Thyroid Ab +, PSA up to 3 0, Free PSA 22  Recurrent sinus infections  Review of Systems   Review of Systems   Constitutional: Negative for activity change  HENT: Positive for congestion  Respiratory: Negative for shortness of breath      Cardiovascular: Negative for chest pain and palpitations  Endocrine:        Trouble losing weight  Genitourinary: Positive for frequency  Negative for decreased urine volume and difficulty urinating         Active Problem List     Patient Active Problem List   Diagnosis    Wellness examination    Impaired fasting glucose    Vitamin D deficiency    Knee pain    Palpitations    Elevated troponin    SVT (supraventricular tachycardia) (HCC)         Past Medical History:  Past Medical History:   Diagnosis Date    Allergic 2000    Arthritis     r foot    Infectious diarrhea     LAST ASSESSED: [id number]    Overweight     LAST ASSESSED: [id number]    Panic attacks     last one 4-5 months ago    Paresthesias/numbness     LAST ASSESSED: [id number]    Prediabetes     LAST ASSESSED: [id number]    Prehypertension     LAST ASSESSED: [id number]    Tinea versicolor     LAST ASSESSED: [id number]    Varicose vein of leg     r leg    Vitamin D deficiency     LAST ASSESSED: [id number]    Wears glasses        Past Surgical History:  Past Surgical History:   Procedure Laterality Date    APPENDECTOMY      LAST ASSESSED: [id number]    CHEILECTOMY Right 12/1/2017    Procedure: CHEILECTOMY;  Surgeon: Kenyetta Dorsey DPM;  Location: [facility];  Service: Podiatry    HIP PINNING Left     as a child    HIP SURGERY      LAST ASSESSED: [id number]       Family History:  Family History   Problem Relation Age of Onset    Diabetes Mother     Hypertension Mother     Dementia Mother     Arthritis Mother     Prostate cancer Father     Heart attack Father     Stroke Father         Late [de-identified]    Cancer Father         Prostate    Diabetes Paternal Aunt        Social History:  Social History     Socioeconomic History    Marital status: /Civil Union     Spouse name: Not on file    Number of children: Not on file    Years of education: Not on file    Highest education level: Not on file   Occupational History    Not on file   Tobacco Use    Smoking status: Never Smoker    Smokeless tobacco: Never Used   Vaping Use    Vaping Use: Never used   Substance and Sexual Activity    Alcohol use: Yes     Alcohol/week: 13 0 standard drinks     Types: 10 Cans of beer, 3 Standard drinks or equivalent per week     Comment: (HISTORY), SOCIAL    Drug use: No    Sexual activity: Yes     Partners: Female   Other Topics Concern    Not on file   Social History Narrative    Not on file     Social Determinants of Health     Financial Resource Strain: Not on file   Food Insecurity: Not on file   Transportation Needs: Not on file   Physical Activity: Not on file   Stress: Not on file   Social Connections: Not on file   Intimate Partner Violence: Not on file   Housing Stability: Not on file       Objective     Vitals:    09/14/22 0953   BP: 110/84   Pulse: 72   Resp: 14   Temp: (!) 97 4 °F (36 3 °C)   SpO2: 97%     Wt Readings from Last 3 Encounters:   09/14/22 108 kg (237 lb)   08/01/22 109 kg (240 lb 11 9 oz)   07/30/22 109 kg (240 lb)       Physical Exam  Vitals and nursing note reviewed  Constitutional:       General: He is not in acute distress  Appearance: Normal appearance  He is not ill-appearing, toxic-appearing or diaphoretic  HENT:      Head: Normocephalic and atraumatic  Eyes:      General: No scleral icterus  Conjunctiva/sclera: Conjunctivae normal    Cardiovascular:      Rate and Rhythm: Normal rate and regular rhythm  Heart sounds: Normal heart sounds  No murmur heard  No friction rub  No gallop  Pulmonary:      Effort: Pulmonary effort is normal  No respiratory distress  Breath sounds: Normal breath sounds  No stridor  No wheezing, rhonchi or rales  Musculoskeletal:      Cervical back: Normal range of motion and neck supple  No rigidity or tenderness  Lymphadenopathy:      Cervical: No cervical adenopathy  Neurological:      Mental Status: He is alert and oriented to person, place, and time     Psychiatric:         Mood and Affect: Mood normal          Behavior: Behavior normal          Thought Content: Thought content normal          Judgment: Judgment normal          Pertinent Laboratory/Diagnostic Studies:  Lab Results   Component Value Date    BUN 12 09/09/2022    CREATININE 0 94 09/09/2022    CALCIUM 9 2 09/09/2022     03/22/2017    K 4 1 09/09/2022    CO2 29 09/09/2022     09/09/2022     Lab Results   Component Value Date    ALT 43 09/09/2022    AST 27 09/09/2022    ALKPHOS 61 09/09/2022    BILITOT 0 7 03/22/2017       Lab Results   Component Value Date    WBC 6 94 07/31/2022    HGB 14 6 07/31/2022    HCT 41 9 07/31/2022    MCV 87 07/31/2022     07/31/2022       No results found for: TSH    Lab Results   Component Value Date    CHOL 159 03/22/2017     Lab Results   Component Value Date    TRIG 108 09/09/2022     Lab Results   Component Value Date    HDL 40 09/09/2022     Lab Results   Component Value Date    LDLCALC 95 09/09/2022     Lab Results   Component Value Date    HGBA1C 6 0 (H) 09/09/2022       Results for orders placed or performed in visit on 09/09/22   Comprehensive metabolic panel   Result Value Ref Range    Sodium 138 135 - 147 mmol/L    Potassium 4 1 3 5 - 5 3 mmol/L    Chloride 105 96 - 108 mmol/L    CO2 29 21 - 32 mmol/L    ANION GAP 4 4 - 13 mmol/L    BUN 12 5 - 25 mg/dL    Creatinine 0 94 0 60 - 1 30 mg/dL    Glucose, Fasting 90 65 - 99 mg/dL    Calcium 9 2 8 3 - 10 1 mg/dL    AST 27 5 - 45 U/L    ALT 43 12 - 78 U/L    Alkaline Phosphatase 61 46 - 116 U/L    Total Protein 7 2 6 4 - 8 4 g/dL    Albumin 3 8 3 5 - 5 0 g/dL    Total Bilirubin 0 68 0 20 - 1 00 mg/dL    eGFR 88 ml/min/1 73sq m   HEMOGLOBIN A1C W/ EAG ESTIMATION   Result Value Ref Range    Hemoglobin A1C 6 0 (H) Normal 3 8-5 6%; PreDiabetic 5 7-6 4%;  Diabetic >=6 5%; Glycemic control for adults with diabetes <7 0% %     mg/dl   Lipid Panel with Direct LDL reflex   Result Value Ref Range    Cholesterol 157 See Comment mg/dL Triglycerides 108 See Comment mg/dL    HDL, Direct 40 >=40 mg/dL    LDL Calculated 95 0 - 100 mg/dL   TSH, 3rd generation with Free T4 reflex   Result Value Ref Range    TSH 3RD GENERATON 4 570 (H) 0 450 - 4 500 uIU/mL   T3, free   Result Value Ref Range    T3, Free 3 18 2 30 - 4 20 pg/mL   PSA, total and free   Result Value Ref Range    Prostate Specific Antigen Total 3 0 0 0 - 4 0 ng/mL    PSA, Free 0 66 N/A ng/mL    PSA, Free Pct 22 0 %   Anti-microsomal antibody   Result Value Ref Range    THYROID MICROSOMAL ANTIBODY 192 (H) 0 - 34 IU/mL   Anti-thyroglobulin antibody   Result Value Ref Range    Thyroglobulin Ab 20 7 (H) 0 0 - 0 9 IU/mL   T4, free   Result Value Ref Range    Free T4 1 01 0 76 - 1 46 ng/dL       Orders Placed This Encounter   Procedures    US thyroid    TSH, 3rd generation with Free T4 reflex    Ambulatory Referral to Urology    Ambulatory Referral to Otolaryngology       ALLERGIES:  No Known Allergies    Current Medications     Current Outpatient Medications   Medication Sig Dispense Refill    Cholecalciferol (VITAMIN D) 2000 units CAPS Take 1 capsule by mouth daily in the early morning      fluticasone (FLONASE) 50 mcg/act nasal spray 1 spray into each nostril daily      levothyroxine (Euthyrox) 25 mcg tablet Take 1 tablet (25 mcg total) by mouth daily in the early morning 30 tablet 5    metoprolol tartrate (LOPRESSOR) 25 mg tablet Take 0 5 tablets (12 5 mg total) by mouth every 12 (twelve) hours (Patient taking differently: Take 12 5 mg by mouth daily) 30 tablet 6    multivitamin (THERAGRAN) TABS Take 1 tablet by mouth daily in the early morning      Fexofenadine HCl (ALLEGRA ALLERGY PO) Take by mouth       No current facility-administered medications for this visit           Health Maintenance     Health Maintenance   Topic Date Due    COVID-19 Vaccine (1) Never done    Influenza Vaccine (1) 09/01/2022    Depression Screening  06/08/2023    BMI: Followup Plan  06/08/2023    Annual Physical  06/08/2023    BMI: Adult  09/14/2023    DTaP,Tdap,and Td Vaccines (2 - Td or Tdap) 12/04/2024    Colorectal Cancer Screening  02/12/2025    HIV Screening  Completed    Hepatitis C Screening  Completed    Pneumococcal Vaccine: Pediatrics (0 to 5 Years) and At-Risk Patients (6 to 59 Years)  Aged Out    HIB Vaccine  Aged Out    Hepatitis B Vaccine  Aged Out    IPV Vaccine  Aged Out    Hepatitis A Vaccine  Aged Out    Meningococcal ACWY Vaccine  Aged Out    HPV Vaccine  Aged [employer] History   Administered Date(s) Administered    INFLUENZA 10/14/2021    Influenza Injectable, MDCK, Preservative Free, Quadrivalent, 0 5 mL 11/14/2019    Influenza, injectable, quadrivalent, preservative free 0 5 mL 11/05/2018, 10/26/2020    Tdap 12/04/2014    Zoster Vaccine Recombinant 10/26/2020          Carlos Barron DO

## 2022-09-19 ENCOUNTER — HOSPITAL ENCOUNTER (OUTPATIENT)
Dept: ULTRASOUND IMAGING | Facility: HOSPITAL | Age: 57
Discharge: HOME | End: 2022-09-19
Payer: COMMERCIAL

## 2022-09-19 DIAGNOSIS — R79.89 ABNORMAL TSH: ICD-10-CM

## 2022-09-19 PROCEDURE — 76536 US EXAM OF HEAD AND NECK: CPT

## 2022-10-04 DIAGNOSIS — R79.89 ABNORMAL TSH: ICD-10-CM

## 2022-10-04 RX ORDER — LEVOTHYROXINE SODIUM 0.03 MG/1
25 TABLET ORAL
Qty: 30 TABLET | Refills: 0 | Status: SHIPPED | OUTPATIENT
Start: 2022-10-04 | End: 2022-10-28

## 2022-10-04 NOTE — TELEPHONE ENCOUNTER
Medication Refill Request     Name Levothyroxine  Dose/Frequency 25mcg daily  Quantity 30  Verified pharmacy   [x]  Verified ordering Provider   [x]  Does patient have enough for the next 3 days? Yes [x] No []    PT SAYS HE'S GOING ON VACATION ON FRIDAY AND NEEDS THIS REFILL BEFORE HE LEAVES

## 2022-10-06 ENCOUNTER — TELEPHONE (OUTPATIENT)
Dept: FAMILY MEDICINE CLINIC | Facility: CLINIC | Age: 57
End: 2022-10-06

## 2022-10-06 NOTE — TELEPHONE ENCOUNTER
Please call the pharmacy, and ask them to release  He just started the med, will be going away - and we want him to continue, and it is not controlled! If they still do not want to due to insurance  Pt unfortunately could pay cash - but would not be that expensive, as the med is completely generic  Thanks    Elsa

## 2022-10-06 NOTE — TELEPHONE ENCOUNTER
Called the pharmacy, they are not able to release it through insurance until tomorrow   They did run it through with a discount care for patient to  today it would cost him $7 99 and they can have it ready by 2 PM  Patient made aware

## 2022-10-06 NOTE — TELEPHONE ENCOUNTER
Pt is leaving on vacation in four hours and the pharmacy won't release his levothyroxine b/c it's too soon to fill       Please advise

## 2022-10-20 ENCOUNTER — OFFICE VISIT (OUTPATIENT)
Dept: UROLOGY | Facility: CLINIC | Age: 57
End: 2022-10-20
Payer: COMMERCIAL

## 2022-10-20 VITALS
BODY MASS INDEX: 35.05 KG/M2 | HEIGHT: 70 IN | DIASTOLIC BLOOD PRESSURE: 80 MMHG | SYSTOLIC BLOOD PRESSURE: 110 MMHG | WEIGHT: 244.8 LBS | HEART RATE: 68 BPM | OXYGEN SATURATION: 96 %

## 2022-10-20 DIAGNOSIS — R97.20 RISING PSA LEVEL: ICD-10-CM

## 2022-10-20 PROCEDURE — 99203 OFFICE O/P NEW LOW 30 MIN: CPT | Performed by: PHYSICIAN ASSISTANT

## 2022-10-20 NOTE — PROGRESS NOTES
1  Rising PSA level  Ambulatory Referral to Urology    PSA Total, Diagnostic    Pt with reassuring exam prior, and Free PSA, but PSA rise has been accelerated, and has famhx for prostate cancer - refer to Urology  Assessment and plan:       1  Prostate cancer screening  2  Family history of prostate cancer  - PSA slightly rising  - normal DORINA  - repeat PSA in 6 months  If ongoing progression, will obtain an MRI and possible biopsy    Pro Lacy      Chief Complaint     Chief Complaint   Patient presents with   • Elevated PSA     New PT          History of Present Illness     Navdeep Jameson is a 59 y o  male presenting today for consultation of elevated PSA  Patient was undergoing routine prostate cancer screening with his primary care provider  PSA 3 0 (9/9/22), 2 5 (6/8/22), 2 0 (6/24/21)  He endorses some weakening of his urinary stream and nocturia 2-3 times nightly  He is overall not bothered by this and wishes to defer pharmacotherapy at this time  He does have a family history of prostate cancer in his father who was diagnosed in his 62s and treated with prostatectomy  Medical comorbidities include vitamin D deficiency and SVT  Laboratory     Lab Results   Component Value Date    CREATININE 0 94 09/09/2022       Lab Results   Component Value Date    PSA 3 0 09/09/2022    PSA 2 5 06/08/2022    PSA 2 0 06/24/2021       Review of Systems     Review of Systems   Constitutional: Negative for activity change, appetite change, chills, diaphoresis, fatigue, fever and unexpected weight change  Respiratory: Negative for chest tightness and shortness of breath  Cardiovascular: Negative for chest pain, palpitations and leg swelling  Gastrointestinal: Negative for abdominal distention, abdominal pain, constipation, diarrhea, nausea and vomiting     Genitourinary: Negative for decreased urine volume, difficulty urinating, dysuria, enuresis, flank pain, frequency, genital sores, hematuria and urgency  Musculoskeletal: Negative for back pain, gait problem and myalgias  Skin: Negative for color change, pallor, rash and wound  Psychiatric/Behavioral: Negative for behavioral problems  The patient is not nervous/anxious  Allergies     No Known Allergies    Physical Exam     Physical Exam  Constitutional:       General: He is not in acute distress  Appearance: Normal appearance  He is normal weight  He is not ill-appearing, toxic-appearing or diaphoretic  HENT:      Head: Normocephalic and atraumatic  Eyes:      General:         Right eye: No discharge  Left eye: No discharge  Conjunctiva/sclera: Conjunctivae normal    Pulmonary:      Effort: Pulmonary effort is normal  No respiratory distress  Genitourinary:     Comments: Good rectal tone  Prostate 35g, no nodules  Musculoskeletal:         General: No swelling or tenderness  Normal range of motion  Skin:     General: Skin is warm and dry  Coloration: Skin is not jaundiced or pale  Neurological:      General: No focal deficit present  Mental Status: He is alert and oriented to person, place, and time  Psychiatric:         Mood and Affect: Mood normal          Behavior: Behavior normal          Thought Content:  Thought content normal          Vital Signs     Vitals:    10/20/22 0808   BP: 110/80   BP Location: Left arm   Patient Position: Sitting   Cuff Size: Standard   Pulse: 68   SpO2: 96%   Weight: 111 kg (244 lb 12 8 oz)   Height: 5' 10 35" (1 787 m)         Current Medications       Current Outpatient Medications:   •  Cholecalciferol (VITAMIN D) 2000 units CAPS, Take 1 capsule by mouth daily in the early morning, Disp: , Rfl:   •  Fexofenadine HCl (ALLEGRA ALLERGY PO), Take by mouth, Disp: , Rfl:   •  fluticasone (FLONASE) 50 mcg/act nasal spray, 1 spray into each nostril daily, Disp: , Rfl:   •  levothyroxine (Euthyrox) 25 mcg tablet, Take 1 tablet (25 mcg total) by mouth daily in the early morning, Disp: 30 tablet, Rfl: 0  •  multivitamin (THERAGRAN) TABS, Take 1 tablet by mouth daily in the early morning, Disp: , Rfl:   •  metoprolol tartrate (LOPRESSOR) 25 mg tablet, Take 0 5 tablets (12 5 mg total) by mouth every 12 (twelve) hours (Patient taking differently: Take 12 5 mg by mouth daily), Disp: 30 tablet, Rfl: 6      Active Problems     Patient Active Problem List   Diagnosis   • Wellness examination   • Impaired fasting glucose   • Vitamin D deficiency   • Knee pain   • Palpitations   • Elevated troponin   • SVT (supraventricular tachycardia) ([facility])         Past Medical History     Past Medical History:   Diagnosis Date   • Allergic 2000   • Arthritis     r foot   • Infectious diarrhea     LAST ASSESSED: [id number]   • Overweight     LAST ASSESSED: 66IMZ0208   • Panic attacks     last one 4-5 months ago   • Paresthesias/numbness     LAST ASSESSED: [id number]   • Prediabetes     LAST ASSESSED: 49NAF5860   • Prehypertension     LAST ASSESSED: [id number]   • Tinea versicolor     LAST ASSESSED: 81NPK7701   • Varicose vein of leg     r leg   • Vitamin D deficiency     LAST ASSESSED: [id number]   • Wears glasses          Surgical History     Past Surgical History:   Procedure Laterality Date   • APPENDECTOMY      LAST ASSESSED: [id number]   • CHEILECTOMY Right 12/1/2017    Procedure: CHEILECTOMY;  Surgeon: Kostas Arriaga DPM;  Location: [facility];  Service: Podiatry   • HIP PINNING Left     as a child   • HIP SURGERY      LAST ASSESSED: [id number]         Family History     Family History   Problem Relation Age of Onset   • Diabetes Mother    • Hypertension Mother    • Dementia Mother    • Arthritis Mother    • Prostate cancer Father    • Heart attack Father    • Stroke Father         Late [de-identified]   • Cancer Father         Prostate   • Diabetes Paternal Aunt          Social History     Social History       Radiology

## 2022-10-28 DIAGNOSIS — R79.89 ABNORMAL TSH: ICD-10-CM

## 2022-10-28 RX ORDER — LEVOTHYROXINE SODIUM 0.03 MG/1
25 TABLET ORAL
Qty: 30 TABLET | Refills: 0 | Status: SHIPPED | OUTPATIENT
Start: 2022-10-28

## 2022-11-25 DIAGNOSIS — R79.89 ABNORMAL TSH: ICD-10-CM

## 2022-11-25 RX ORDER — LEVOTHYROXINE SODIUM 0.03 MG/1
25 TABLET ORAL
Qty: 30 TABLET | Refills: 0 | Status: SHIPPED | OUTPATIENT
Start: 2022-11-25

## 2022-12-05 NOTE — OP NOTE
OPERATIVE REPORT  PATIENT NAME: Marysol Perez    :  1965  MRN: [id number]  Pt Location: [facility] OR ROOM 03    SURGERY DATE: 2017    Surgeon(s) and Role:     * Daksha Calhoun DPM - Primary     * Delmis Montoya - Aruna    Preop Diagnosis:  Hallux rigidus of right foot [M20 21]    Post-Op Diagnosis Codes:     * Hallux rigidus of right foot [M20 21]    Procedure(s) (LRB):  CHEILECTOMY (Right)    Specimen(s):  * No specimens in log *    Estimated Blood Loss:   Minimal    Drains:  None    Hemostasis:  Pneumatic ankle tourniquet at 250 mm of mercury for 32 minutes    Materials:  Vicryl, nylon       Anesthesia Type:   IV Sedation with Anesthesia with 10 cc one-to-one mix 1% lidocaine plain and 0 5% Marcaine plain    Operative Indications:  Hallux rigidus of right foot [M20 21]    Operative Findings:  C/w diagnosis  Prominent osteophytes circumferentially around 1st MPJ    Complications:   None    Procedure and Technique:  Under mild sedation, the patient was brought into the operating room and placed on the operating room table in the supine position  A pneumatic ankle tourniquet was then placed around the patient's right ankle with ample webril padding  A time out was performed to confirm the correct patient, procedure and site with all parties in agreement  Following IV sedation, local anesthetic was obtained about the patient's right foot was performed consisting of 10 ml of 1% Lidocaine and 0 5% Bupivacaine in a 1:1 mixture  The foot was then scrubbed, prepped and draped in the usual aseptic manner  An esmarch bandage was utilized to exsangunate the patients foot and the pneumatic ankle tourniquet was then inflated  The esmarch bandage was removed and the foot was placed on the operating room table  Attention was then directed to the dorsomedial aspect of the first ray, where a linear longitudinal incision was made  The incision was deepened through the subcutaneous tissues using sharp and blunt dissection  Care was taken to identify and retract all vital neurovascular structures  All bleeders were ligated and cauterized as necessary  At this time, a linear longitudinal capsular and periosteal incision was made at the first metatarsophalangeal joint  The capsule was reflected from the medial and lateral aspect of the first metatarsal head  There are prominent osteophytes circumferentially around the 1st MPJ, most notably to the dorsal aspect of the metatarsal head and the base of the proximal phalanx  The cartilage was inspected and noted to be grossly intact  At this time, a sagittal saw was utilized to resect the prominent dorsal eminence on the first metatarsal head  Utilizing a combination of sagittal saw, rongeur, and sofia all prominent osteophytes were removed from the dorsal, medial and lateral surfaces of the joint  The ROM of the 1st MTPJ was then tested  This was noted to be slightly improved  The wound was then flushed with copious amounts of sterile saline  Capsular closure was then obtained utilizing 3-0 Vicryl  Subcutaneous tissue was closed utilizing 4-0 Vicryl  Skin closure was obtained utilizing 4-0 nylon placed in a horizontal mattress type of fashion      Patient Disposition:  PACU     SIGNATURE: Romi Thornton  DATE: December 1, 2017  TIME: 8:30 AM Oriented to time, place, person, situation

## 2022-12-07 ENCOUNTER — RA CDI HCC (OUTPATIENT)
Dept: OTHER | Facility: HOSPITAL | Age: 57
End: 2022-12-07

## 2022-12-07 NOTE — PROGRESS NOTES
[facility]  coding opportunities       Chart reviewed, no opportunity found: CHART REVIEWED, NO OPPORTUNITY FOUND        Patients Insurance        Commercial Insurance: [address] [place] [address]

## 2022-12-13 ENCOUNTER — APPOINTMENT (OUTPATIENT)
Dept: LAB | Facility: CLINIC | Age: 57
End: 2022-12-13

## 2022-12-13 DIAGNOSIS — R79.89 ABNORMAL TSH: ICD-10-CM

## 2022-12-13 LAB
T4 FREE SERPL-MCNC: 0.95 NG/DL (ref 0.76–1.46)
TSH SERPL DL<=0.05 MIU/L-ACNC: 5.73 UIU/ML (ref 0.45–4.5)

## 2022-12-15 ENCOUNTER — OFFICE VISIT (OUTPATIENT)
Dept: FAMILY MEDICINE CLINIC | Facility: CLINIC | Age: 57
End: 2022-12-15

## 2022-12-15 VITALS
DIASTOLIC BLOOD PRESSURE: 84 MMHG | HEART RATE: 78 BPM | RESPIRATION RATE: 14 BRPM | HEIGHT: 70 IN | SYSTOLIC BLOOD PRESSURE: 120 MMHG | TEMPERATURE: 97.2 F | WEIGHT: 245.8 LBS | BODY MASS INDEX: 35.19 KG/M2 | OXYGEN SATURATION: 99 %

## 2022-12-15 DIAGNOSIS — E06.3 HYPOTHYROIDISM DUE TO HASHIMOTO'S THYROIDITIS: ICD-10-CM

## 2022-12-15 DIAGNOSIS — R97.20 RISING PSA LEVEL: ICD-10-CM

## 2022-12-15 DIAGNOSIS — I47.1 SVT (SUPRAVENTRICULAR TACHYCARDIA) (HCC): Primary | ICD-10-CM

## 2022-12-15 DIAGNOSIS — Z13.6 SCREENING FOR CARDIOVASCULAR CONDITION: ICD-10-CM

## 2022-12-15 DIAGNOSIS — E03.8 HYPOTHYROIDISM DUE TO HASHIMOTO'S THYROIDITIS: ICD-10-CM

## 2022-12-15 DIAGNOSIS — R73.01 IMPAIRED FASTING GLUCOSE: ICD-10-CM

## 2022-12-15 RX ORDER — LEVOTHYROXINE SODIUM 0.05 MG/1
50 TABLET ORAL
Qty: 90 TABLET | Refills: 3 | Status: SHIPPED | OUTPATIENT
Start: 2022-12-15

## 2022-12-15 NOTE — PROGRESS NOTES
FAMILY PRACTICE OFFICE VISIT       NAME: Annika Hale  AGE: 59 y o  SEX: male       : 1965        MRN: [id number]    DATE: 12/15/2022  TIME: 4:06 PM    Assessment and Plan   1  SVT (supraventricular tachycardia) ([facility] [address] )  Comments:  Stable on present management - continues f/u with Cardiology  Considering ablation  2  Rising PSA level  Comments:  Stable - followed by Urology  3  Hypothyroidism due to Hashimoto's thyroiditis  Comments:  Increasing Levothyroxine dose - recheck TSH with other labs, prior to next OV  Orders:  -     TSH, 3rd generation with Free T4 reflex; Future  -     levothyroxine (Euthyrox) 50 mcg tablet; Take 1 tablet (50 mcg total) by mouth daily in the early morning    4  Impaired fasting glucose  Comments:  Pt t continue a lower carb diet, and remain active  Orders:  -     Comprehensive metabolic panel; Future  -     HEMOGLOBIN A1C W/ EAG ESTIMATION; Future    5  Screening for cardiovascular condition  -     Lipid Panel with Direct LDL reflex; Future         There are no Patient Instructions on file for this visit  Chief Complaint     Chief Complaint   Patient presents with   • Follow-up     Patient being seen for 3 month follow up        History of Present Illness   Annika Hale is a 59y o -year-old male who presents in f/u today  F/u labs done - TSH 5 730, FT4 0 95  Saw Urology - they are repeating his PSA in 6 months  Will be seeing Cardiology when he returns from [place]  Review of Systems   Review of Systems   Constitutional: Negative for activity change  Respiratory: Negative for shortness of breath  Cardiovascular: Negative for chest pain  Rare palpitations, short-lived  Genitourinary: Negative for decreased urine volume and difficulty urinating         Active Problem List     Patient Active Problem List   Diagnosis   • Wellness examination   • Impaired fasting glucose   • Vitamin D deficiency   • Knee pain   • Palpitations   • Elevated troponin   • SVT (supraventricular tachycardia) ([facility])         Past Medical History:  Past Medical History:   Diagnosis Date   • Allergic 2000   • Arthritis     r foot   • Infectious diarrhea     LAST ASSESSED: [id number]   • Overweight     LAST ASSESSED: 54OYE3791   • Panic attacks     last one 4-5 months ago   • Paresthesias/numbness     LAST ASSESSED: [id number]   • Prediabetes     LAST ASSESSED: 06LKX1393   • Prehypertension     LAST ASSESSED: [id number]   • Tinea versicolor     LAST ASSESSED: 18OII7318   • Varicose vein of leg     r leg   • Vitamin D deficiency     LAST ASSESSED: 95GUQ0757   • Wears glasses        Past Surgical History:  Past Surgical History:   Procedure Laterality Date   • APPENDECTOMY      LAST ASSESSED: [id number]   • CHEILECTOMY Right 12/1/2017    Procedure: CHEILECTOMY;  Surgeon: Falguni Marion DPM;  Location: [facility];  Service: Podiatry   • HIP PINNING Left     as a child   • HIP SURGERY      LAST ASSESSED: [id number]       Family History:  Family History   Problem Relation Age of Onset   • Diabetes Mother    • Hypertension Mother    • Dementia Mother    • Arthritis Mother    • Prostate cancer Father    • Heart attack Father    • Stroke Father         Late [de-identified]   • Cancer Father         Prostate   • Diabetes Paternal Aunt        Social History:  Social History     Socioeconomic History   • Marital status: /Civil Union     Spouse name: Not on file   • Number of children: Not on file   • Years of education: Not on file   • Highest education level: Not on file   Occupational History   • Not on file   Tobacco Use   • Smoking status: Never   • Smokeless tobacco: Never   Vaping Use   • Vaping Use: Never used   Substance and Sexual Activity   • Alcohol use:  Yes     Alcohol/week: 13 0 standard drinks     Types: 10 Cans of beer, 3 Standard drinks or equivalent per week     Comment: (HISTORY), SOCIAL   • Drug use: No   • Sexual activity: Yes     Partners: Female   Other Topics Concern   • Not on file Social History Narrative   • Not on file     Social Determinants of Health     Financial Resource Strain: Not on file   Food Insecurity: Not on file   Transportation Needs: Not on file   Physical Activity: Not on file   Stress: Not on file   Social Connections: Not on file   Intimate Partner Violence: Not on file   Housing Stability: Not on file       Objective     Vitals:    12/15/22 1543   BP: 120/84   Pulse: 78   Resp: 14   Temp: (!) 97 2 °F (36 2 °C)   SpO2: 99%     Wt Readings from Last 3 Encounters:   12/15/22 111 kg (245 lb 12 8 oz)   10/20/22 111 kg (244 lb 12 8 oz)   10/17/22 108 kg (237 lb)       Physical Exam  Vitals and nursing note reviewed  Constitutional:       General: He is not in acute distress  Appearance: Normal appearance  He is not ill-appearing, toxic-appearing or diaphoretic  HENT:      Head: Normocephalic and atraumatic  Eyes:      General: No scleral icterus  Conjunctiva/sclera: Conjunctivae normal    Cardiovascular:      Rate and Rhythm: Normal rate and regular rhythm  Heart sounds: Normal heart sounds  No murmur heard  No friction rub  No gallop  Pulmonary:      Effort: Pulmonary effort is normal  No respiratory distress  Breath sounds: Normal breath sounds  No stridor  No wheezing, rhonchi or rales  Musculoskeletal:      Cervical back: Normal range of motion and neck supple  No rigidity or tenderness  Lymphadenopathy:      Cervical: No cervical adenopathy  Neurological:      Mental Status: He is alert and oriented to person, place, and time  Psychiatric:         Mood and Affect: Mood normal          Behavior: Behavior normal          Thought Content:  Thought content normal          Judgment: Judgment normal          Pertinent Laboratory/Diagnostic Studies:  Lab Results   Component Value Date    BUN 12 09/09/2022    CREATININE 0 94 09/09/2022    CALCIUM 9 2 09/09/2022     03/22/2017    K 4 1 09/09/2022    CO2 29 09/09/2022     09/09/2022 Lab Results   Component Value Date    ALT 43 09/09/2022    AST 27 09/09/2022    ALKPHOS 61 09/09/2022    BILITOT 0 7 03/22/2017       Lab Results   Component Value Date    WBC 6 94 07/31/2022    HGB 14 6 07/31/2022    HCT 41 9 07/31/2022    MCV 87 07/31/2022     07/31/2022       No results found for: TSH    Lab Results   Component Value Date    CHOL 159 03/22/2017     Lab Results   Component Value Date    TRIG 108 09/09/2022     Lab Results   Component Value Date    HDL 40 09/09/2022     Lab Results   Component Value Date    LDLCALC 95 09/09/2022     Lab Results   Component Value Date    HGBA1C 6 0 (H) 09/09/2022       Results for orders placed or performed in visit on 12/13/22   TSH, 3rd generation with Free T4 reflex   Result Value Ref Range    TSH 3RD GENERATON 5 730 (H) 0 450 - 4 500 uIU/mL   T4, free   Result Value Ref Range    Free T4 0 95 0 76 - 1 46 ng/dL       Orders Placed This Encounter   Procedures   • TSH, 3rd generation with Free T4 reflex   • Comprehensive metabolic panel   • HEMOGLOBIN A1C W/ EAG ESTIMATION   • Lipid Panel with Direct LDL reflex       ALLERGIES:  No Known Allergies    Current Medications     Current Outpatient Medications   Medication Sig Dispense Refill   • Cholecalciferol (VITAMIN D) 2000 units CAPS Take 1 capsule by mouth daily in the early morning     • Fexofenadine HCl (ALLEGRA ALLERGY PO) Take by mouth     • fluticasone (FLONASE) 50 mcg/act nasal spray 1 spray into each nostril daily     • levothyroxine (Euthyrox) 50 mcg tablet Take 1 tablet (50 mcg total) by mouth daily in the early morning 90 tablet 3   • metoprolol tartrate (LOPRESSOR) 25 mg tablet Take 0 5 tablets (12 5 mg total) by mouth every 12 (twelve) hours (Patient taking differently: Take 12 5 mg by mouth daily) 30 tablet 6   • multivitamin (THERAGRAN) TABS Take 1 tablet by mouth daily in the early morning       No current facility-administered medications for this visit           [facility] Maintenance Health Maintenance   Topic Date Due   • Hepatitis B Vaccine (1 of 3 - 3-dose series) Never done   • COVID-19 Vaccine (1) Never done   • BMI: Followup Plan  06/08/2023   • Annual Physical  06/08/2023   • Depression Screening  12/15/2023   • BMI: Adult  12/15/2023   • DTaP,Tdap,and Td Vaccines (2 - Td or Tdap) 12/04/2024   • Colorectal Cancer Screening  02/12/2025   • HIV Screening  Completed   • Hepatitis C Screening  Completed   • Influenza Vaccine  Completed   • Pneumococcal Vaccine: Pediatrics (0 to 5 Years) and At-Risk Patients (6 to 59 Years)  Aged Out   • HIB Vaccine  Aged Out   • IPV Vaccine  Aged Out   • Hepatitis A Vaccine  Aged Out   • Meningococcal ACWY Vaccine  Aged Out   • HPV Vaccine  Aged [employer] History   Administered Date(s) Administered   • INFLUENZA 10/14/2021, 10/20/2022   • Influenza Injectable, MDCK, Preservative Free, Quadrivalent, 0 5 mL 11/14/2019   • Influenza, injectable, quadrivalent, preservative free 0 5 mL 11/05/2018, 10/26/2020   • Tdap 12/04/2014   • Zoster Vaccine Recombinant 10/26/2020          Carlos Barron DO

## 2023-01-27 ENCOUNTER — TELEPHONE (OUTPATIENT)
Dept: FAMILY MEDICINE CLINIC | Facility: CLINIC | Age: 58
End: 2023-01-27

## 2023-01-27 NOTE — TELEPHONE ENCOUNTER
Sent [employer]ert[employer] message for pt to call office and schedule appt per his [employer] request  Phone number not in service

## 2023-03-22 ENCOUNTER — APPOINTMENT (OUTPATIENT)
Dept: LAB | Facility: CLINIC | Age: 58
End: 2023-03-22

## 2023-03-22 DIAGNOSIS — R73.01 IMPAIRED FASTING GLUCOSE: ICD-10-CM

## 2023-03-22 DIAGNOSIS — Z13.6 SCREENING FOR CARDIOVASCULAR CONDITION: ICD-10-CM

## 2023-03-22 DIAGNOSIS — E06.3 HYPOTHYROIDISM DUE TO HASHIMOTO'S THYROIDITIS: ICD-10-CM

## 2023-03-22 DIAGNOSIS — E03.8 HYPOTHYROIDISM DUE TO HASHIMOTO'S THYROIDITIS: ICD-10-CM

## 2023-03-22 LAB
ALBUMIN SERPL BCP-MCNC: 4 G/DL (ref 3.5–5)
ALP SERPL-CCNC: 53 U/L (ref 46–116)
ALT SERPL W P-5'-P-CCNC: 28 U/L (ref 12–78)
ANION GAP SERPL CALCULATED.3IONS-SCNC: 4 MMOL/L (ref 4–13)
AST SERPL W P-5'-P-CCNC: 22 U/L (ref 5–45)
BILIRUB SERPL-MCNC: 0.76 MG/DL (ref 0.2–1)
BUN SERPL-MCNC: 19 MG/DL (ref 5–25)
CALCIUM SERPL-MCNC: 9.2 MG/DL (ref 8.3–10.1)
CHLORIDE SERPL-SCNC: 107 MMOL/L (ref 96–108)
CHOLEST SERPL-MCNC: 188 MG/DL
CO2 SERPL-SCNC: 26 MMOL/L (ref 21–32)
CREAT SERPL-MCNC: 1.02 MG/DL (ref 0.6–1.3)
EST. AVERAGE GLUCOSE BLD GHB EST-MCNC: 117 MG/DL
GFR SERPL CREATININE-BSD FRML MDRD: 80 ML/MIN/1.73SQ M
GLUCOSE P FAST SERPL-MCNC: 125 MG/DL (ref 65–99)
HBA1C MFR BLD: 5.7 %
HDLC SERPL-MCNC: 49 MG/DL
LDLC SERPL CALC-MCNC: 119 MG/DL (ref 0–100)
POTASSIUM SERPL-SCNC: 4.4 MMOL/L (ref 3.5–5.3)
PROT SERPL-MCNC: 7.1 G/DL (ref 6.4–8.4)
SODIUM SERPL-SCNC: 137 MMOL/L (ref 135–147)
TRIGL SERPL-MCNC: 102 MG/DL
TSH SERPL DL<=0.05 MIU/L-ACNC: 4.12 UIU/ML (ref 0.45–4.5)

## 2023-03-23 ENCOUNTER — OFFICE VISIT (OUTPATIENT)
Dept: FAMILY MEDICINE CLINIC | Facility: CLINIC | Age: 58
End: 2023-03-23

## 2023-03-23 VITALS
TEMPERATURE: 97.5 F | BODY MASS INDEX: 35.07 KG/M2 | SYSTOLIC BLOOD PRESSURE: 110 MMHG | HEART RATE: 94 BPM | HEIGHT: 70 IN | OXYGEN SATURATION: 98 % | DIASTOLIC BLOOD PRESSURE: 82 MMHG | WEIGHT: 245 LBS | RESPIRATION RATE: 14 BRPM

## 2023-03-23 DIAGNOSIS — E03.8 HYPOTHYROIDISM DUE TO HASHIMOTO'S THYROIDITIS: Primary | ICD-10-CM

## 2023-03-23 DIAGNOSIS — I47.1 SVT (SUPRAVENTRICULAR TACHYCARDIA) (HCC): ICD-10-CM

## 2023-03-23 DIAGNOSIS — R73.03 PREDIABETES: ICD-10-CM

## 2023-03-23 DIAGNOSIS — E06.3 HYPOTHYROIDISM DUE TO HASHIMOTO'S THYROIDITIS: Primary | ICD-10-CM

## 2023-03-23 DIAGNOSIS — I10 PRIMARY HYPERTENSION: ICD-10-CM

## 2023-03-23 RX ORDER — LEVOTHYROXINE SODIUM 0.07 MG/1
75 TABLET ORAL
Qty: 90 TABLET | Refills: 3 | Status: SHIPPED | OUTPATIENT
Start: 2023-03-23

## 2023-03-24 ENCOUNTER — OFFICE VISIT (OUTPATIENT)
Dept: CARDIOLOGY CLINIC | Facility: CLINIC | Age: 58
End: 2023-03-24

## 2023-03-24 VITALS
WEIGHT: 248 LBS | RESPIRATION RATE: 16 BRPM | BODY MASS INDEX: 35.5 KG/M2 | HEIGHT: 70 IN | DIASTOLIC BLOOD PRESSURE: 70 MMHG | HEART RATE: 70 BPM | OXYGEN SATURATION: 98 % | SYSTOLIC BLOOD PRESSURE: 126 MMHG

## 2023-03-24 DIAGNOSIS — I47.1 SVT (SUPRAVENTRICULAR TACHYCARDIA) (HCC): Primary | ICD-10-CM

## 2023-03-24 DIAGNOSIS — R73.01 IMPAIRED FASTING GLUCOSE: ICD-10-CM

## 2023-03-24 NOTE — PROGRESS NOTES
Matthew Garvey  1965  [id number]  [facility] [address] [address] [phone]    1  SVT (supraventricular tachycardia) ([facility])  POCT ECG      2  Impaired fasting glucose            Discussion/Summary:  1  Supraventricular tachycardia/AVNRT  2  Atypical chest pain secondary to 1   3  Calcium score 14  4  Preserved LV systolic function  5  Dyslipidemia     Recommendations: Overall recuperating well from her last appointment  Been minimal recurrent supraventricular tachycardia  Continue current dose of beta-blocker  Blood pressure is well controlled  We talked about his elevated LDL and prior minimal elevated coronary calcium score  He is going to try diet and exercise if no improvement will start low-dose statin a couple days a week  Interval History:  Very pleasant 22-year-old gentleman who presented last year for an 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 as documented in the chart  He has a history of occasional palpitations for many years  During his stress test he went into an AVNRT for about 2 minutes at peak exercise  He says he gets this problem on occasion  He was recently playing in a golf tournament he might have been little bit dehydrated and had a couple alcoholic beverages the night before  He then went into a rapid rhythm with sustained heart rates around 160 according to his Apple watch  He had a sensation of fullness in his chest and he could feel his heart was racing  He then broke spontaneously and heart rate came down to the 120s  But of time he got to the emergency department he was already in sinus tachycardia  Denies any exertional limitations  There has been no anginal sounding discomfort  Denies any shortness of breath, lightheadedness, dizziness, or syncope    There has been no lower extremity edema    Since her last visit he has been doing well he remains quite physically active denies any chest pain, shortness of breath, lightheadedness, dizziness, or syncope  He has had a few episodes of palpitation usually these last 1 to 2 minutes and then resolved  He thinks that dehydration may be a trigger  Medical Problems             Problem List     Wellness examination    Impaired fasting glucose    Vitamin D deficiency    Knee pain    Palpitations    Elevated troponin    SVT [facility]supra[facility]ricular tachycardia) ([facility])              Past Medical History:   Diagnosis Date   • Allergic 2000   • Arthritis     r foot   • Hypothyroidism    • Infectious diarrhea     LAST ASSESSED: [id number]   • Overweight     LAST ASSESSED: 75UYP3097   • Panic attacks     last one 4-5 months ago   • Paresthesias/numbness     LAST ASSESSED: 99NSB5663   • Prediabetes     LAST ASSESSED: [id number]   • Prehypertension     LAST ASSESSED: [id number]   • Tinea versicolor     LAST ASSESSED: [id number]   • Varicose vein of leg     r leg   • Vitamin D deficiency     LAST ASSESSED: 30AUG2017   • Wears glasses      Social History     Socioeconomic History   • Marital status: /Civil Union     Spouse name: Not on file   • Number of children: Not on file   • Years of education: Not on file   • Highest education level: Not on file   Occupational History   • Not on file   Tobacco Use   • Smoking status: Never   • Smokeless tobacco: Never   Vaping Use   • Vaping Use: Never used   Substance and Sexual Activity   • Alcohol use:  Yes     Alcohol/week: 13 0 standard drinks     Types: 10 Cans of beer, 3 Standard drinks or equivalent per week     Comment: (HISTORY), SOCIAL   • Drug use: No   • Sexual activity: Yes     Partners: Female   Other Topics Concern   • Not on file   Social History Narrative   • Not on file     Social Determinants of Health     Financial Resource Strain: Not on file   Food Insecurity: Not on file   Transportation Needs: Not on file   Physical Activity: Not on file   Stress: Not on file   Social Connections: Not on file   Intimate Partner Violence: Not on file   Housing Stability: Not on file      Family History   Problem Relation Age of Onset   • Diabetes Mother    • Hypertension Mother    • Dementia Mother    • Arthritis Mother    • Prostate cancer Father    • Heart attack Father    • Stroke Father         Late [de-identified]   • Cancer Father         Prostate   • Diabetes Paternal Aunt      Past Surgical History:   Procedure Laterality Date   • APPENDECTOMY      LAST ASSESSED: [id number]   • CHEILECTOMY Right 12/1/2017    Procedure: CHEILECTOMY;  Surgeon: Shahida Newberry DPM;  Location: [facility];  Service: Podiatry   • HIP PINNING Left     as a child   • HIP SURGERY      LAST ASSESSED: [id number]       Current Outpatient Medications:   •  Cholecalciferol (VITAMIN D) 2000 units CAPS, Take 1 capsule by mouth daily in the early morning, Disp: , Rfl:   •  fluticasone (FLONASE) 50 mcg/act nasal spray, 1 spray into each nostril daily, Disp: , Rfl:   •  levothyroxine (Euthyrox) 75 mcg tablet, Take 1 tablet (75 mcg total) by mouth daily in the early morning, Disp: 90 tablet, Rfl: 3  •  metoprolol tartrate (LOPRESSOR) 25 mg tablet, Take 0 5 tablets (12 5 mg total) by mouth every 12 (twelve) hours (Patient taking differently: Take 12 5 mg by mouth daily), Disp: 30 tablet, Rfl: 6  •  multivitamin (THERAGRAN) TABS, Take 1 tablet by mouth daily in the early morning, Disp: , Rfl:   •  Fexofenadine HCl (ALLEGRA ALLERGY PO), Take by mouth (Patient not taking: Reported on 3/24/2023), Disp: , Rfl:   No Known Allergies    Labs:     Chemistry        Component Value Date/Time     03/22/2017 0917    K 4 4 03/22/2023 0800    K 4 3 03/22/2017 0917     03/22/2023 0800     03/22/2017 0917    CO2 26 03/22/2023 0800    CO2 29 03/22/2017 0917    BUN 19 03/22/2023 0800    BUN 17 03/22/2017 0917    CREATININE 1 02 03/22/2023 0800    CREATININE 0 99 03/22/2017 0917        Component Value Date/Time    CALCIUM 9 2 03/22/2023 0800    CALCIUM 9 2 03/22/2017 0917    ALKPHOS 53 03/22/2023 0800    ALKPHOS 56 03/22/2017 0917    AST 22 03/22/2023 0800    AST 22 03/22/2017 0917    ALT 28 03/22/2023 0800    ALT 21 03/22/2017 0917    BILITOT 0 7 03/22/2017 0917            Lab Results   Component Value Date    CHOL 159 03/22/2017    CHOL 174 12/22/2014     Lab Results   Component Value Date    HDL 49 03/22/2023    HDL 40 09/09/2022    HDL 44 06/08/2022     Lab Results   Component Value Date    LDLCALC 119 (H) 03/22/2023    LDLCALC 95 09/09/2022    LDLCALC 135 (H) 06/08/2022     Lab Results   Component Value Date    TRIG 102 03/22/2023    TRIG 108 09/09/2022    TRIG 71 06/08/2022     No results found for: CHOLHDL    Imaging: XR chest 1 view portable    Result Date: 7/31/2022  Narrative: CHEST INDICATION:   palpitations  COMPARISON:  Chest radiograph from 6/9/2009, calcium scoring CT from 6/24/2021  EXAM PERFORMED/VIEWS:  XR CHEST PORTABLE FINDINGS: Cardiomediastinal silhouette appears unremarkable  The lungs are clear  No pneumothorax or pleural effusion  Osseous structures appear within normal limits for patient age  Impression: No acute cardiopulmonary disease  Workstation performed: [id number]       ECG:  , PND, orthopnea  Review of Systems   Constitutional: Negative  HENT: Negative  Eyes: Negative  Cardiovascular: Negative for chest pain and palpitations  Respiratory: Negative  Endocrine: Negative  Hematologic/Lymphatic: Negative  Skin: Negative  Musculoskeletal: Negative  Gastrointestinal: Negative  Genitourinary: Negative  Neurological: Negative  Psychiatric/Behavioral: Negative  All other systems reviewed and are negative  Vitals:    03/24/23 0828   BP: 126/70   Pulse: 70   Resp: 16   SpO2: 98%     Vitals:    03/24/23 0828   Weight: 112 kg (248 lb)     Height: 5' 10" (177 8 cm)   Body mass index is 35 58 kg/m²      Physical Exam:  Vital signs reviewed  General:  Alert and cooperative, appears stated age, no acute distress  HEENT:  AMBER EOMI, no scleral icterus, no conjunctival pallor  Neck:  No lymphadenopathy, no thyromegaly, no carotid bruits, no elevated JVP  Heart:  Regular rate and rhythm, normal S1/S2, no S3/S4, no murmur, rubs or gallops  PMI nondisplaced  Lungs:  Clear to auscultation bilaterally, no wheezes rales or rhonchi  Abdomen:  Soft, non-tender, positive bowel sounds, no rebound or guarding,   no organomegaly   Extremities:  Normal range of motion    No clubbing, cyanosis or edema   Vascular:  2+ pedal pulses  Skin:  No rashes or lesions on exposed skin  Neurologic:  Cranial nerves II-XII grossly intact without focal deficits  Psych:  Normal mood and affect

## 2023-05-16 ENCOUNTER — APPOINTMENT (OUTPATIENT)
Dept: LAB | Facility: CLINIC | Age: 58
End: 2023-05-16

## 2023-05-16 DIAGNOSIS — R97.20 RISING PSA LEVEL: ICD-10-CM

## 2023-05-16 LAB — PSA SERPL-MCNC: 2.3 NG/ML (ref 0–4)

## 2023-05-22 ENCOUNTER — OFFICE VISIT (OUTPATIENT)
Dept: UROLOGY | Facility: CLINIC | Age: 58
End: 2023-05-22

## 2023-05-22 VITALS
BODY MASS INDEX: 35.65 KG/M2 | HEART RATE: 66 BPM | OXYGEN SATURATION: 96 % | WEIGHT: 249 LBS | SYSTOLIC BLOOD PRESSURE: 138 MMHG | HEIGHT: 70 IN | DIASTOLIC BLOOD PRESSURE: 86 MMHG

## 2023-05-22 DIAGNOSIS — Z12.5 ENCOUNTER FOR PROSTATE CANCER SCREENING: Primary | ICD-10-CM

## 2023-05-22 NOTE — PROGRESS NOTES
1  Encounter for prostate cancer screening          Assessment and plan:     1  Prostate cancer screening  2  Family history of prostate cancer  -PSA has stabilized at 2 3 with normal digital rectal examination  -Recommend continued annual PSA and DORINA screening with primary care provider  If any future concerns refer back to urology  All questions answered  [id number]      Chief Complaint     Chief Complaint   Patient presents with   • Elevated PSA         History of Present Illness     Marycarmen Chávez is a 59 y o  male presenting today for follow up of prostate cancer screening  Patient was undergoing routine prostate cancer screening with his primary care provider  PSA Trend:   2 3 (5/16/23)  3 0 (9/9/22)  2 5 (6/8/22)  2 0 (6/24/21)  He endorses some weakening of his urinary stream and nocturia 2-3 times nightly  He is overall not bothered by this and wishes to defer pharmacotherapy at this time  He does have a family history of prostate cancer in his father who was diagnosed in his 62s and treated with prostatectomy  Medical comorbidities include vitamin D deficiency and SVT  Laboratory     Lab Results   Component Value Date    CREATININE 1 02 03/22/2023       Lab Results   Component Value Date    PSA 2 3 05/16/2023    PSA 3 0 09/09/2022    PSA 2 5 06/08/2022       Review of Systems     Review of Systems   Constitutional: Negative for activity change, appetite change, chills, diaphoresis, fatigue, fever and unexpected weight change  Respiratory: Negative for chest tightness and shortness of breath  Cardiovascular: Negative for chest pain, palpitations and leg swelling  Gastrointestinal: Negative for abdominal distention, abdominal pain, constipation, diarrhea, nausea and vomiting  Genitourinary: Negative for decreased urine volume, difficulty urinating, dysuria, enuresis, flank pain, frequency, genital sores, hematuria and urgency     Musculoskeletal: Negative for "back pain, gait problem and myalgias  Skin: Negative for color change, pallor, rash and wound  Psychiatric/Behavioral: Negative for behavioral problems  The patient is not nervous/anxious  Allergies     No Known Allergies    Physical Exam     Physical Exam  Constitutional:       General: He is not in acute distress  Appearance: Normal appearance  He is normal weight  He is not ill-appearing, toxic-appearing or diaphoretic  HENT:      Head: Normocephalic and atraumatic  Eyes:      General:         Right eye: No discharge  Left eye: No discharge  Conjunctiva/sclera: Conjunctivae normal    Pulmonary:      Effort: Pulmonary effort is normal  No respiratory distress  Genitourinary:     Comments: Good rectal tone  Prostate 35g, no nodules  Musculoskeletal:         General: No swelling or tenderness  Normal range of motion  Skin:     General: Skin is warm and dry  Coloration: Skin is not jaundiced or pale  Neurological:      General: No focal deficit present  Mental Status: He is alert and oriented to person, place, and time  Psychiatric:         Mood and Affect: Mood normal          Behavior: Behavior normal          Thought Content:  Thought content normal          Vital Signs     Vitals:    05/22/23 0744   BP: 138/86   BP Location: Left arm   Patient Position: Sitting   Cuff Size: Adult   Pulse: 66   SpO2: 96%   Weight: 113 kg (249 lb)   Height: 5' 10\" (1 778 m)         Current Medications       Current Outpatient Medications:   •  Cholecalciferol (VITAMIN D) 2000 units CAPS, Take 1 capsule by mouth daily in the early morning, Disp: , Rfl:   •  fluticasone (FLONASE) 50 mcg/act nasal spray, 1 spray into each nostril daily, Disp: , Rfl:   •  levothyroxine (Euthyrox) 75 mcg tablet, Take 1 tablet (75 mcg total) by mouth daily in the early morning, Disp: 90 tablet, Rfl: 3  •  metoprolol tartrate (LOPRESSOR) 25 mg tablet, Take 0 5 tablets (12 5 mg total) by mouth every 12 " (twelve) hours (Patient taking differently: Take 12 5 mg by mouth daily), Disp: 30 tablet, Rfl: 6  •  multivitamin (THERAGRAN) TABS, Take 1 tablet by mouth daily in the early morning, Disp: , Rfl:   •  Fexofenadine HCl (ALLEGRA ALLERGY PO), Take by mouth (Patient not taking: Reported on 3/24/2023), Disp: , Rfl:       Active Problems     Patient Active Problem List   Diagnosis   • Wellness examination   • Impaired fasting glucose   • Vitamin D deficiency   • Knee pain   • Palpitations   • Elevated troponin   • SVT (supraventricular tachycardia) ([facility])         Past Medical History     Past Medical History:   Diagnosis Date   • Allergic 2000   • Arthritis     r foot   • Hypothyroidism    • Infectious diarrhea     LAST ASSESSED: [id number]   • Overweight     LAST ASSESSED: [id number]   • Panic attacks     last one 4-5 months ago   • Paresthesias/numbness     LAST ASSESSED: [id number]   • Prediabetes     LAST ASSESSED: 69OZL2070   • Prehypertension     LAST ASSESSED: 20ATQ4173   • Tinea versicolor     LAST ASSESSED: [id number]   • Varicose vein of leg     r leg   • Vitamin D deficiency     LAST ASSESSED: 77PAL3991   • Wears glasses          Surgical History     Past Surgical History:   Procedure Laterality Date   • APPENDECTOMY      LAST ASSESSED: [id number]   • CHEILECTOMY Right 12/1/2017    Procedure: CHEILECTOMY;  Surgeon: Viral Contreras DPM;  Location: [facility];  Service: Podiatry   • HIP PINNING Left     as a child   • HIP SURGERY      LAST ASSESSED: [id number]         Family History     Family History   Problem Relation Age of Onset   • Diabetes Mother    • Hypertension Mother    • Dementia Mother    • Arthritis Mother    • Prostate cancer Father    • Heart attack Father    • Stroke Father         Late [de-identified]   • Cancer Father         Prostate   • Diabetes Paternal Aunt          Social History     Social History       Radiology

## 2023-06-08 ENCOUNTER — RA CDI HCC (OUTPATIENT)
Dept: OTHER | Facility: HOSPITAL | Age: 58
End: 2023-06-08

## 2023-06-08 NOTE — PROGRESS NOTES
[facility]  coding opportunities       Chart reviewed, no opportunity found: CHART REVIEWED, NO OPPORTUNITY FOUND     Patients Insurance     Commercial Insurance: [address] [place] [address]

## 2023-06-12 ENCOUNTER — OFFICE VISIT (OUTPATIENT)
Dept: FAMILY MEDICINE CLINIC | Facility: CLINIC | Age: 58
End: 2023-06-12
Payer: COMMERCIAL

## 2023-06-12 VITALS
WEIGHT: 246 LBS | DIASTOLIC BLOOD PRESSURE: 80 MMHG | OXYGEN SATURATION: 97 % | SYSTOLIC BLOOD PRESSURE: 122 MMHG | TEMPERATURE: 96.5 F | RESPIRATION RATE: 14 BRPM | HEART RATE: 90 BPM | HEIGHT: 70 IN | BODY MASS INDEX: 35.22 KG/M2

## 2023-06-12 DIAGNOSIS — Z00.00 ANNUAL PHYSICAL EXAM: Primary | ICD-10-CM

## 2023-06-12 DIAGNOSIS — E03.8 HYPOTHYROIDISM DUE TO HASHIMOTO'S THYROIDITIS: ICD-10-CM

## 2023-06-12 DIAGNOSIS — I47.1 SVT (SUPRAVENTRICULAR TACHYCARDIA) (HCC): ICD-10-CM

## 2023-06-12 DIAGNOSIS — R73.01 IMPAIRED FASTING GLUCOSE: ICD-10-CM

## 2023-06-12 DIAGNOSIS — I10 PRIMARY HYPERTENSION: ICD-10-CM

## 2023-06-12 DIAGNOSIS — E55.9 VITAMIN D DEFICIENCY: ICD-10-CM

## 2023-06-12 DIAGNOSIS — Z23 IMMUNIZATION DUE: ICD-10-CM

## 2023-06-12 DIAGNOSIS — E06.3 HYPOTHYROIDISM DUE TO HASHIMOTO'S THYROIDITIS: ICD-10-CM

## 2023-06-12 PROCEDURE — 99396 PREV VISIT EST AGE 40-64: CPT | Performed by: FAMILY MEDICINE

## 2023-06-12 PROCEDURE — 90471 IMMUNIZATION ADMIN: CPT

## 2023-06-12 PROCEDURE — 90715 TDAP VACCINE 7 YRS/> IM: CPT

## 2023-06-12 NOTE — PROGRESS NOTES
[facility]    NAME: Petr Tobar  AGE: 59 y o  SEX: male  : 1965     DATE: 2023     Assessment and Plan:     Problem List Items Addressed This Visit        Endocrine    Impaired fasting glucose       Cardiovascular and Mediastinum    SVT (supraventricular tachycardia) ([address] )       Other    Vitamin D deficiency   Other Visit Diagnoses     Annual physical exam    -  Primary    Norah Javed appears well  He is to continue working on a lower carb/salt diet, and regular exercise  Continues f/u with Urology as well  Primary hypertension        Controlled on present management  Hypothyroidism due to Hashimoto's thyroiditis        Levothyroxine recently increased -> check TSH again  Relevant Orders    TSH, 3rd generation with Free T4 reflex    Immunization due        Tdap given IM, and tolerated well  Relevant Orders    TDAP VACCINE GREATER THAN OR EQUAL TO 8YO IM          Immunizations and preventive care screenings were discussed with patient today  Appropriate education was printed on patient's after visit summary  Discussed risks and benefits of prostate cancer screening  We discussed the controversial history of PSA screening for prostate cancer in the [place] as well as the risk of over detection and over treatment of prostate cancer by way of PSA screening  The patient understands that PSA blood testing is an imperfect way to screen for prostate cancer and that elevated PSA levels in the blood may also be caused by infection, inflammation, prostatic trauma or manipulation, urological procedures, or by benign prostatic enlargement  The role of the digital rectal examination in prostate cancer screening was also discussed and I discussed with him that there is large interobserver variability in the findings of digital rectal examination      Counseling:  · Dental Health: discussed importance of regular tooth brushing, flossing, and dental visits  Return in 6 months (on 12/12/2023) for Recheck  Chief Complaint:     Chief Complaint   Patient presents with   • Physical Exam     Patient being seen for physical       History of Present Illness:     Adult Annual Physical   Patient here for a comprehensive physical exam  The patient reports no problems  Continues f/u with Cardiology (Dr Porsche Gonzales), and Urology (saw 2 weeks ago)  Needs Tdap  Last labs reviewed  Levothyroxine increased 2 months ago  Diet and Physical Activity  · Diet/Nutrition: well balanced diet  · Exercise: 5-7 times a week on average  Depression Screening  PHQ-2/9 Depression Screening         General Health  · Sleep: sleeps well  · Hearing: normal - bilateral   · Vision: no vision problems  · Dental: regular dental visits   Health  · Symptoms include: none     Review of Systems:     Review of Systems   Constitutional: Negative for activity change  HENT: Positive for congestion  Respiratory: Negative for shortness of breath  Cardiovascular: Positive for palpitations  Negative for chest pain  Gastrointestinal: Negative for abdominal pain and blood in stool  Genitourinary: Negative for decreased urine volume and difficulty urinating  Psychiatric/Behavioral: Negative for dysphoric mood  The patient is not nervous/anxious         Past Medical History:     Past Medical History:   Diagnosis Date   • Allergic 2000   • Arthritis     r foot   • Hypothyroidism    • Infectious diarrhea     LAST ASSESSED: [id number]   • Overweight     LAST ASSESSED: [id number]   • Panic attacks     last one 4-5 months ago   • Paresthesias/numbness     LAST ASSESSED: 30AUG2017   • Prediabetes     LAST ASSESSED: 30AUG2017   • Prehypertension     LAST ASSESSED: [id number]   • Tinea versicolor     LAST ASSESSED: [id number]   • Varicose vein of leg     r leg   • Vitamin D deficiency     LAST ASSESSED: 30AUG2017   • Wears glasses       Past Surgical History:     Past Surgical History:   Procedure Laterality Date   • APPENDECTOMY      LAST ASSESSED: [id number]   • CHEILECTOMY Right 12/1/2017    Procedure: CHEILECTOMY;  Surgeon: Andrew Ayala DPM;  [facility];  Service: Podiatry   • HIP PINNING Left     as a child   • HIP SURGERY      LAST ASSESSED: [id number]      Family History:     Family History   Problem Relation Age of Onset   • Diabetes Mother    • Hypertension Mother    • Dementia Mother    • Arthritis Mother    • Prostate cancer Father    • Heart attack Father    • Stroke Father         Late [de-identified]   • Cancer Father         Prostate   • Diabetes Paternal Aunt       Social History:     Social History     Socioeconomic History   • Marital status: /Civil Union     Spouse name: None   • Number of children: None   • Years of education: None   • Highest education level: None   Occupational History   • None   Tobacco Use   • Smoking status: Never   • Smokeless tobacco: Never   Vaping Use   • Vaping Use: Never used   Substance and Sexual Activity   • Alcohol use:  Yes     Alcohol/week: 13 0 standard drinks of alcohol     Types: 10 Cans of beer, 3 Standard drinks or equivalent per week     Comment: SOCIAL   • Drug use: No   • Sexual activity: Yes     Partners: Female   Other Topics Concern   • None   Social History Narrative   • None     Social Determinants of Health     Financial Resource Strain: Not on file   Food Insecurity: Not on file   Transportation Needs: Not on file   Physical Activity: Not on file   Stress: Not on file   Social Connections: Not on file   Intimate Partner Violence: Not on file   Housing Stability: Not on file      Current Medications:     Current Outpatient Medications   Medication Sig Dispense Refill   • Cholecalciferol (VITAMIN D) 2000 units CAPS Take 1 capsule by mouth daily in the early morning     • fluticasone (FLONASE) 50 mcg/act nasal spray 1 spray into each nostril daily     • levothyroxine (Euthyrox) 75 "mcg tablet Take 1 tablet (75 mcg total) by mouth daily in the early morning 90 tablet 3   • multivitamin (THERAGRAN) TABS Take 1 tablet by mouth daily in the early morning     • Fexofenadine HCl (ALLEGRA ALLERGY PO) Take by mouth (Patient not taking: Reported on 3/24/2023)     • metoprolol tartrate (LOPRESSOR) 25 mg tablet Take 0 5 tablets (12 5 mg total) by mouth every 12 (twelve) hours (Patient taking differently: Take 12 5 mg by mouth daily) 30 tablet 6     No current facility-administered medications for this visit  Allergies:     No Known Allergies   Physical Exam:     /80 (BP Location: Left arm, Patient Position: Sitting, Cuff Size: Large)   Pulse 90   Temp (!) 96 5 °F (35 8 °C) (Tympanic)   Resp 14   Ht 5' 10 24\" (1 784 m)   Wt 112 kg (246 lb)   SpO2 97%   BMI 35 06 kg/m²     Physical Exam  Vitals and nursing note reviewed  Constitutional:       General: He is not in acute distress  Appearance: Normal appearance  He is not ill-appearing, toxic-appearing or diaphoretic  HENT:      Head: Normocephalic and atraumatic  Right Ear: Tympanic membrane, ear canal and external ear normal       Left Ear: Tympanic membrane, ear canal and external ear normal       Mouth/Throat:      Mouth: Mucous membranes are moist       Pharynx: Oropharynx is clear  No oropharyngeal exudate or posterior oropharyngeal erythema  Eyes:      General: No scleral icterus  Conjunctiva/sclera: Conjunctivae normal    Cardiovascular:      Rate and Rhythm: Normal rate and regular rhythm  Pulses: Normal pulses  Heart sounds: Normal heart sounds  No murmur heard  No friction rub  No gallop  Pulmonary:      Effort: Pulmonary effort is normal  No respiratory distress  Breath sounds: Normal breath sounds  No stridor  No wheezing, rhonchi or rales  Abdominal:      General: Abdomen is flat  Bowel sounds are normal  There is no distension  Palpations: Abdomen is soft  There is no mass        " Tenderness: There is no abdominal tenderness  There is no guarding or rebound  Musculoskeletal:      Cervical back: Normal range of motion and neck supple  No rigidity or tenderness  Right lower leg: No edema  Left lower leg: No edema  Lymphadenopathy:      Cervical: No cervical adenopathy  Neurological:      Mental Status: He is alert and oriented to person, place, and time  Psychiatric:         Mood and Affect: Mood normal          Behavior: Behavior normal          Thought Content: Thought content normal          Judgment: Judgment normal           Magdaleno Shaffer was seen today for physical exam     Diagnoses and all orders for this visit:    Annual physical exam  Comments:  Cliff Wahl appears well  He is to continue working on a lower carb/salt diet, and regular exercise  Continues f/u with Urology as well  SVT (supraventricular tachycardia) (HCC)  Comments:  Stable at this time - continues f/u with Cardiology  Considering possible future ablation  On Metoprolol  Impaired fasting glucose  Comments:  Stable - last A1c stable  Primary hypertension  Comments:  Controlled on present management  Vitamin D deficiency  Comments:  Stable on his OTC Vit D3 supplement  Hypothyroidism due to Hashimoto's thyroiditis  Comments:  Levothyroxine recently increased -> check TSH again  Orders:  -     TSH, 3rd generation with Free T4 reflex; Future    Immunization due  Comments: Tdap given IM, and tolerated well    Orders:  -     TDAP VACCINE GREATER THAN OR EQUAL TO 8YO IM          [address] Yolande Becker, DO  [address]

## 2023-08-08 ENCOUNTER — OFFICE VISIT (OUTPATIENT)
Dept: PULMONOLOGY | Facility: CLINIC | Age: 58
End: 2023-08-08
Payer: COMMERCIAL

## 2023-08-08 VITALS
SYSTOLIC BLOOD PRESSURE: 122 MMHG | WEIGHT: 248 LBS | BODY MASS INDEX: 35.5 KG/M2 | HEART RATE: 64 BPM | OXYGEN SATURATION: 98 % | HEIGHT: 70 IN | DIASTOLIC BLOOD PRESSURE: 80 MMHG

## 2023-08-08 DIAGNOSIS — G47.10 HYPERSOMNIA: Primary | ICD-10-CM

## 2023-08-08 PROCEDURE — 99204 OFFICE O/P NEW MOD 45 MIN: CPT | Performed by: INTERNAL MEDICINE

## 2023-08-08 NOTE — LETTER
August 10, 2023     Nancie Moise    Patient: Nancie Moise   YOB: 1965   Date of Visit: 8/8/2023       Dear Dr. Munir De Oliveira:    Thank you for referring Nancie Moise to me for evaluation. Below are my notes for this consultation. If you have questions, please do not hesitate to call me. I look forward to following your patient along with you. Sincerely,        Phillip Cano MD        CC: No Recipients    Phillip Cano MD  8/8/2023 11:44 AM  Sign when Signing Visit  Assessment/Plan:    Hypersomnia  Patient's bed partner notes snoring and respiratory events at night that may represent obstructive airway events. Patient senses nonrestful sleep and has some daytime sleepiness. Some nocturia but no morning headache. Trouble driving. Tentative diagnosis of obstructive sleep apnea patient has a family history of same. In home testing and if positive automated CPAP. Diagnoses and all orders for this visit:    Hypersomnia  -     Home Study; Future         Subjective:     Patient ID: Nancie Moise is a 59 y.o. male. This patient is self-referred based on his wife's observations of respiratory problems at night. Patient has nonrestful sleep and daytime sleepiness. Now he is having some problems with nasal obstruction due to allergy. Has some nocturia. No morning headache. No trouble driving. No apparent cognitive problems. Son has obstructive sleep apnea. Patient has hypothyroidism on replacement therapy with a normal TSH. No recent weight changes. Works as a . No sense of dyspnea. Background of atrial tachycardia which has not been symptomatic at night. No recorded nocturnal arrhythmia on prolonged heart monitoring in 2021.       The following portions of the patient's history were reviewed and updated as appropriate: allergies, current medications, past family history, past medical history, past social history, past surgical history and problem list.    Review of Systems   Constitutional: Negative for activity change and unexpected weight change. HENT: Positive for congestion and sinus pressure. Respiratory: Positive for apnea. Negative for cough, shortness of breath and wheezing. Apnea seems likely   Cardiovascular: Negative for chest pain, palpitations and leg swelling. Gastrointestinal: Negative for abdominal pain. Endocrine: Negative for cold intolerance and heat intolerance. Genitourinary: Positive for enuresis. Musculoskeletal: Positive for arthralgias. Knee pain   Skin: Negative. Allergic/Immunologic: Positive for environmental allergies. Neurological: Negative. Objective:      /80 (BP Location: Right arm, Patient Position: Sitting, Cuff Size: Standard)   Pulse 64   Ht 5' 10" (1.778 m)   Wt 112 kg (248 lb)   SpO2 98%   BMI 35.58 kg/m²         Physical Exam  Vitals reviewed. Constitutional:       General: He is not in acute distress. Appearance: He is obese. He is not ill-appearing. HENT:      Nose: Nose normal.      Mouth/Throat:      Mouth: Mucous membranes are moist.      Pharynx: Oropharynx is clear. Eyes:      General: No scleral icterus. Conjunctiva/sclera: Conjunctivae normal.   Neck:      Vascular: No JVD. Cardiovascular:      Rate and Rhythm: Normal rate and regular rhythm. Pulses:           Radial pulses are 3+ on the right side and 3+ on the left side. Heart sounds: Normal heart sounds. Pulmonary:      Effort: Pulmonary effort is normal.      Breath sounds: Normal breath sounds. Abdominal:      General: Abdomen is flat. There is no distension. Palpations: Abdomen is soft. There is no hepatomegaly or splenomegaly. Musculoskeletal:         General: No swelling. Cervical back: No rigidity or tenderness. Right lower leg: No edema. Left lower leg: No edema. Lymphadenopathy:      Cervical: No cervical adenopathy. Skin:     General: Skin is warm and dry. Neurological:      Mental Status: He is alert and oriented to person, place, and time.    Psychiatric:         Mood and Affect: Mood normal.         Behavior: Behavior normal.

## 2023-08-08 NOTE — PROGRESS NOTES
Assessment/Plan:    Hypersomnia  Patient's bed partner notes snoring and respiratory events at night that may represent obstructive airway events. Patient senses nonrestful sleep and has some daytime sleepiness. Some nocturia but no morning headache. Trouble driving. Tentative diagnosis of obstructive sleep apnea patient has a family history of same. In home testing and if positive automated CPAP. Diagnoses and all orders for this visit:    Hypersomnia  -     Home Study; Future          Subjective:      Patient ID: Misha Quispe is a 59 y.o. male. This patient is self-referred based on his wife's observations of respiratory problems at night. Patient has nonrestful sleep and daytime sleepiness. Now he is having some problems with nasal obstruction due to allergy. Has some nocturia. No morning headache. No trouble driving. No apparent cognitive problems. Son has obstructive sleep apnea. Patient has hypothyroidism on replacement therapy with a normal TSH. No recent weight changes. Works as a . No sense of dyspnea. Background of atrial tachycardia which has not been symptomatic at night. No recorded nocturnal arrhythmia on prolonged heart monitoring in 2021. The following portions of the patient's history were reviewed and updated as appropriate: allergies, current medications, past family history, past medical history, past social history, past surgical history and problem list.    Review of Systems   Constitutional: Negative for activity change and unexpected weight change. HENT: Positive for congestion and sinus pressure. Respiratory: Positive for apnea. Negative for cough, shortness of breath and wheezing. Apnea seems likely   Cardiovascular: Negative for chest pain, palpitations and leg swelling. Gastrointestinal: Negative for abdominal pain. Endocrine: Negative for cold intolerance and heat intolerance. Genitourinary: Positive for enuresis.    Musculoskeletal: Positive for arthralgias. Knee pain   Skin: Negative. Allergic/Immunologic: Positive for environmental allergies. Neurological: Negative. Objective:      /80 (BP Location: Right arm, Patient Position: Sitting, Cuff Size: Standard)   Pulse 64   Ht 5' 10" (1.778 m)   Wt 112 kg (248 lb)   SpO2 98%   BMI 35.58 kg/m²          Physical Exam  Vitals reviewed. Constitutional:       General: He is not in acute distress. Appearance: He is obese. He is not ill-appearing. HENT:      Nose: Nose normal.      Mouth/Throat:      Mouth: Mucous membranes are moist.      Pharynx: Oropharynx is clear. Eyes:      General: No scleral icterus. Conjunctiva/sclera: Conjunctivae normal.   Neck:      Vascular: No JVD. Cardiovascular:      Rate and Rhythm: Normal rate and regular rhythm. Pulses:           Radial pulses are 3+ on the right side and 3+ on the left side. Heart sounds: Normal heart sounds. Pulmonary:      Effort: Pulmonary effort is normal.      Breath sounds: Normal breath sounds. Abdominal:      General: Abdomen is flat. There is no distension. Palpations: Abdomen is soft. There is no hepatomegaly or splenomegaly. Musculoskeletal:         General: No swelling. Cervical back: No rigidity or tenderness. Right lower leg: No edema. Left lower leg: No edema. Lymphadenopathy:      Cervical: No cervical adenopathy. Skin:     General: Skin is warm and dry. Neurological:      Mental Status: He is alert and oriented to person, place, and time.    Psychiatric:         Mood and Affect: Mood normal.         Behavior: Behavior normal.

## 2023-08-08 NOTE — ASSESSMENT & PLAN NOTE
Patient's bed partner notes snoring and respiratory events at night that may represent obstructive airway events. Patient senses nonrestful sleep and has some daytime sleepiness. Some nocturia but no morning headache. Trouble driving. Tentative diagnosis of obstructive sleep apnea patient has a family history of same. In home testing and if positive automated CPAP.

## 2023-08-08 NOTE — LETTER
August 8, 2023     [place] [place] ([facility] [id number]    Patient: Nancie Moise   YOB: 1965   Date of Visit: 8/8/2023       Dear Dr. Munir De Oliveira:    Thank you for referring Nanice Moise to me for evaluation. Below are the relevant portions of my assessment and plan of care. Diagnoses and all orders for this visit:    Hypersomnia  -     Home Study; Future        If you have questions, please do not hesitate to call me. I look forward to following Brea Durbin along with you.          Sincerely,        Phillip Cano MD        CC: No Recipients

## 2023-08-08 NOTE — LETTER
August 8, 2023     Referral Self    Patient: David Vick   YOB: 1965   Date of Visit: 8/8/2023       Dear Dr. Lilian Merlin:    Thank you for referring David Vick to me for evaluation. Below are the relevant portions of my assessment and plan of care. Diagnoses and all orders for this visit:    Hypersomnia  -     Home Study; Future        If you have questions, please do not hesitate to call me. I look forward to following Rhianna Salas along with you.          Sincerely,        Luz Carr MD        CC: No Recipients

## 2023-08-22 ENCOUNTER — TELEPHONE (OUTPATIENT)
Dept: SLEEP CENTER | Facility: CLINIC | Age: 58
End: 2023-08-22

## 2023-08-22 DIAGNOSIS — R00.2 PALPITATIONS: ICD-10-CM

## 2023-08-22 NOTE — TELEPHONE ENCOUNTER
----- Message from Andreea Morrell MD sent at 8/17/2023  9:10 PM EDT -----  approved  ----- Message -----  From: Aditi Macias  Sent: 8/9/2023  11:04 AM EDT  To: Sleep Medicine [facility] Provider    This home sleep study needs approval.     If approved please sign and return to clerical pool. If denied please include reasons why. Also provide alternative testing if warranted. Please sign and return to clerical pool.

## 2023-08-29 NOTE — ANESTHESIA POSTPROCEDURE EVALUATION
Post-Op Assessment Note      CV Status:  Stable    Mental Status:  Alert and awake    Hydration Status:  Euvolemic    PONV Controlled:  Controlled    Airway Patency:  Patent    Post Op Vitals Reviewed: Yes          Staff: Anesthesiologist           BP      Temp     Pulse     Resp      SpO2 Cartilage Graft Text: The defect edges were debeveled with a #15 scalpel blade.  Given the location of the defect, shape of the defect, the fact the defect involved a full thickness cartilage defect a cartilage graft was deemed most appropriate.  An appropriate donor site was identified, cleansed, and anesthetized. The cartilage graft was then harvested and transferred to the recipient site, oriented appropriately and then sutured into place.  The secondary defect was then repaired using a primary closure.

## 2023-09-08 ENCOUNTER — APPOINTMENT (OUTPATIENT)
Dept: LAB | Facility: CLINIC | Age: 58
End: 2023-09-08
Payer: COMMERCIAL

## 2023-09-08 ENCOUNTER — HOSPITAL ENCOUNTER (OUTPATIENT)
Dept: SLEEP CENTER | Facility: CLINIC | Age: 58
Discharge: HOME | End: 2023-09-08
Payer: COMMERCIAL

## 2023-09-08 DIAGNOSIS — E06.3 HYPOTHYROIDISM DUE TO HASHIMOTO'S THYROIDITIS: ICD-10-CM

## 2023-09-08 DIAGNOSIS — G47.10 HYPERSOMNIA: ICD-10-CM

## 2023-09-08 DIAGNOSIS — E03.8 HYPOTHYROIDISM DUE TO HASHIMOTO'S THYROIDITIS: ICD-10-CM

## 2023-09-08 LAB
T4 FREE SERPL-MCNC: 0.97 NG/DL (ref 0.61–1.12)
TSH SERPL DL<=0.05 MIU/L-ACNC: 4.7 UIU/ML (ref 0.45–4.5)

## 2023-09-08 PROCEDURE — 84443 ASSAY THYROID STIM HORMONE: CPT

## 2023-09-08 PROCEDURE — G0399 HOME SLEEP TEST/TYPE 3 PORTA: HCPCS

## 2023-09-08 PROCEDURE — 36415 COLL VENOUS BLD VENIPUNCTURE: CPT

## 2023-09-08 PROCEDURE — 84439 ASSAY OF FREE THYROXINE: CPT

## 2023-09-11 DIAGNOSIS — G47.33 OSA (OBSTRUCTIVE SLEEP APNEA): Primary | ICD-10-CM

## 2023-09-11 PROCEDURE — G0399 HOME SLEEP TEST/TYPE 3 PORTA: HCPCS | Performed by: INTERNAL MEDICINE

## 2023-09-11 NOTE — PROGRESS NOTES
Cardiology  Office Visit Note  Tanmay Owens   59 y.o.   male   MRN: [id number]  [place]  [address] [facility] [address]  [phone]    PCP: Noe Abdi DO  Cardiologist: Dr. Leatha Headley            Summary of recommendations  -Discussed the benefit of Mediterranean/DASH diet, low carbohydrate diet for weight loss and optimal LDL  -Recommended statin therapy for prevention; he will discuss with Dr. Leatha Headley in the future  -Referred to EP- Dr. Cathy Alexander consider SVT/AVNRTablation. There was a strip from his stress echo on 2021  -Discussed medical maneuvers  -He can take a as needed extra metoprolol if recurrent SVT. His heart rhythm remains elevated for greater than an hour, recommend ED eval  Follow up will be scheduled with Dr Leatha Headley in Feb  Colon Ca screenin2015       Assessment/plan  SVT/AVNRT  · Encouraged to remain well-hydrated  · Continue metoprolol tartrate 12.5 mg every 12  · EKG today normal sinus rhythm 61 bpm  · He has had recurrence about 1 time a week for at least a year. Refer to EP  · I encouraged him to try to obtain an EKG on his smart watch [employer] monitor  Palpitations  ADAIR, severe by home study 23. Treatment in progress  /94  Dyslipidemia. Statin therapy is recommended given his coronary artery calcium score   Latest Reference Range & Units 20 13:15 21 08:40 22 12:11 22 11:23 08:00   Cholesterol See Comment mg/dL 172 187 193 157 188   Triglycerides See Comment mg/dL 71 54 71 108 102   HDL >=40 mg/dL 51 55 44 40 49   LDL Calculated 0 - 100 mg/dL 107 (H) 121 (H) 135 (H) 95 119 (H)     CAC score of 14  Impaired fasting glucose. Latest Reference Range & Units 21 08:40 22 12:11 22 11:21 23 08:00   Hemoglobin A1C Normal 3.8-5.6%; PreDiabetic 5.7-6.4%;  Diabetic >=6.5%; Glycemic control for adults with diabetes <7.0% % 5.7 (H) 5.8 (H) 6.0 (H) 5.7 (H)       Hypothyroidism on replacement  Cardiac testing  · CAC score 6/24/21  Left main coronary artery:  0  Left anterior descending coronary artery and diagonal branches:  14  Left circumflex coronary artery and left marginal branches:  0  Right coronary artery and right marginal branches:  0  Posterior descending coronary artery: 0  TOTAL coronary calcium score:  14  • Exercise stress echo 6/24/2021  Duration of exercise was 13 min and 30 sec. Maximal work rate was 16.2 METs. Maximal heart rate during stress was 171 bpm ( 106 % of maximal predicted heart rate). Target heart rate was achieved. There was normal resting blood pressure with a hypertensive response to stress. There was no chest pain during stress.   ECG CONCLUSIONS:The stress ECG was negative for ischemia. Arrhythmia during stress: supraventricular tachycardia (likely AVNRT) at peak stress that persisted for 2 minutes into recovery. BASELINE:There were no regional wall motion abnormalities. Overall left ventricular systolic function was normal.  PEAK STRESS:There were no regional wall motion abnormalities. There was an appropriate augmentation in LV function. ECHO CONCLUSIONS:There was no echocardiographic evidence for stress-induced ischemia. · Zio 8/31/21  Patient had a min HR of 48 bpm, max HR of 128 bpm, and avg HR of 78bpm. Predominant underlying rhythm was Sinus Rhythm. 4  Supraventricular Tachycardia runs occurred, the run with the fastest  interval lasting 6 beats with a max rate of 128 bpm, the longest lasting 8beats with an avg rate of 110 bpm. Isolated SVEs were rare (<1.0%), SVE. Couplets were rare (<1.0%), and SVE Triplets were rare (<1.0%). Isolated VEs were rare (<1.0%, 59), VE Triplets were rare (<1.0%, 1), and no VECouplets were present. Impression:  1. Brief episodes of SVT - longest lasting 8 beats. 2. No symptoms associated with SVT.                  MORENITA Winkler is a 23-year-old male with SVT, dyslipidemia and impaired fasting glucose. He has a coronary calcium score of 14. He follows with Dr. 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 During a stress echocardiogram he went into SVT, at 2 minutes of peak exercise. This suggested AVNRT. He has been maintained on low-dose metoprolol 12.5 mg every 12. He had a similar recurrence during a golf treatment, when he may have been dehydrated and had a couple of alcoholic beverages the night before. He did end up in the ED July 2022. Thus far he has been maintained on a heart healthy diet, and regular exercise regimen. Reassessment of his lipids requested. Consideration for statin therapy a couple times a week, in the future was recommended. His last office visit was 3/20/2023      9/11/23  Acute visit  Chief complaint palpitations  He reports he has had recurrent palpitations and high heart rates over the last year, occurring about once a week. [place] the beach was in the 180s. He has tried some vagal maneuvers in the past.  He came down without medication. When he has the high heart rates he feels terrible. Otherwise, he walks several times a week, and he denies any exertional chest pain or shortness of breath. This was first identified on a stress echocardiogram for an executive physical with June 24, 2021. The patient does have a smart watch. At this point he has not had an EKG from home that identifies the rhythm  After shared decision making he would like to sit or ablation.   He will be referred to EP  In the meantime we discussed vagal maneuvers  We will take a as needed extra dose of metoprolol  We discussed when to go to the ED    I have spent 40 minutes with Patient  today in which greater than 50% of this time was spent in counseling/coordination of care regarding Instructions for management, Patient and family education, Importance of tx compliance, Risk factor reductions, Documenting in the medical record, Reviewing / ordering tests, medicine, procedures  , Obtaining or reviewing history   and Communicating with other healthcare professionals . Assessment  Diagnoses and all orders for this visit:    SVT (supraventricular tachycardia) (HCC)    ADAIR (obstructive sleep apnea)    Impaired fasting glucose    Palpitations          Past Medical History:   Diagnosis Date   • Allergic 2000   • Arthritis     r foot   • Hypothyroidism    • Infectious diarrhea     LAST ASSESSED: [id number]   • Overweight     LAST ASSESSED: [id number]   • Panic attacks     last one 4-5 months ago   • Paresthesias/numbness     LAST ASSESSED: [id number]   • Prediabetes     LAST ASSESSED: [id number]   • Prehypertension     LAST ASSESSED: [id number]   • Tinea versicolor     LAST ASSESSED: [id number]   • Varicose vein of leg     r leg   • Vitamin D deficiency     LAST ASSESSED: [id number]   • Wears glasses        Review of Systems   Constitutional: Negative for chills. Cardiovascular: Positive for palpitations. Negative for chest pain, claudication, cyanosis, dyspnea on exertion, irregular heartbeat, leg swelling, near-syncope, orthopnea, paroxysmal nocturnal dyspnea and syncope. Respiratory: Negative for cough and shortness of breath. Gastrointestinal: Negative for heartburn and nausea. Neurological: Negative for dizziness, focal weakness, headaches, light-headedness and weakness. All other systems reviewed and are negative. No Known Allergies  .     Current Outpatient Medications:   •  Cholecalciferol (VITAMIN D) 2000 units CAPS, Take 1 capsule by mouth daily in the early morning, Disp: , Rfl:   •  Fexofenadine HCl (ALLEGRA ALLERGY PO), Take by mouth (Patient not taking: Reported on 3/24/2023), Disp: , Rfl:   •  fluticasone (FLONASE) 50 mcg/act nasal spray, 1 spray into each nostril daily, Disp: , Rfl:   •  levothyroxine (Euthyrox) 75 mcg tablet, Take 1 tablet (75 mcg total) by mouth daily in the early morning, Disp: 90 tablet, Rfl: 3  •  metoprolol tartrate (LOPRESSOR) 25 mg tablet, TAKE 0.5 TABLETS (12.5 MG TOTAL) BY MOUTH EVERY 12 (TWELVE) HOURS, Disp: 30 tablet, Rfl: 6  •  multivitamin (THERAGRAN) TABS, Take 1 tablet by mouth daily in the early morning, Disp: , Rfl:         Social History     Socioeconomic History   • Marital status: /Civil Union     Spouse name: Not on file   • Number of children: Not on file   • Years of education: Not on file   • Highest education level: Not on file   Occupational History   • Not on file   Tobacco Use   • Smoking status: Never   • Smokeless tobacco: Never   Vaping Use   • Vaping Use: Never used   Substance and Sexual Activity   • Alcohol use: Yes     Alcohol/week: 13.0 standard drinks of alcohol     Types: 10 Cans of beer, 3 Standard drinks or equivalent per week     Comment: SOCIAL   • Drug use: No   • Sexual activity: Yes     Partners: Female   Other Topics Concern   • Not on file   Social History Narrative   • Not on file     Social Determinants of Health     Financial Resource Strain: Not on file   Food Insecurity: Not on file   Transportation Needs: Not on file   Physical Activity: Not on file   Stress: Not on file   Social Connections: Not on file   Intimate Partner Violence: Not on file   Housing Stability: Not on file       Family History   Problem Relation Age of Onset   • Diabetes Mother    • Hypertension Mother    • Dementia Mother    • Arthritis Mother    • Prostate cancer Father    • Heart attack Father    • Stroke Father         Late 80’s   • Cancer Father         Prostate   • Diabetes Paternal Aunt        Physical Exam  Vitals and nursing note reviewed. Constitutional:       General: He is not in acute distress. HENT:      Head: Normocephalic and atraumatic. Eyes:      Conjunctiva/sclera: Conjunctivae normal.   Cardiovascular:      Rate and Rhythm: Normal rate and regular rhythm. Pulses: Intact distal pulses. Heart sounds: Normal heart sounds.    Pulmonary:      Effort: Pulmonary effort is normal. Breath sounds: Normal breath sounds. Abdominal:      General: Bowel sounds are normal.      Palpations: Abdomen is soft. Musculoskeletal:         General: Normal range of motion. Cervical back: Normal range of motion and neck supple. Skin:     General: Skin is warm and dry. Neurological:      Mental Status: He is alert and oriented to person, place, and time. Vitals: There were no vitals taken for this visit. Wt Readings from Last 3 Encounters:   23 112 kg (248 lb)   23 112 kg (246 lb)   23 113 kg (249 lb)         Labs & Results:  Lab Results   Component Value Date    WBC 6.94 2022    HGB 14.6 2022    HCT 41.9 2022    MCV 87 2022     2022     No results found for: "BNP"  No components found for: "CHEM"  No results found for: "CKTOTAL", "TROPONINI", "Leane Flax", "CKMBINDEX"  Results for orders placed during the hospital encounter of 21    Echo complete with contrast if indicated    Narrative  [address] [place] [address], [address] [place] [address]  [phone]    Transthoracic Echocardiogram  2D, M-mode, Doppler, and Color Doppler    Study date:  2021    Patient: Pepito Odell  MR number: [id number]  Account number: [de-identified]  : 1965  Age: 60 years  Gender: Male  Status: Outpatient  Location: [address] [place] [address]  Height: 70 in  Weight: 238 lb  BP: 128/ 78 mmHg    Indications: Encounter for preventive health examination    Diagnoses: Z00.00 - Encounter for general adult medical examination without abnormal findings    Sonographer:  SONIA Helm  Primary Physician:  Lou Marks DO  Referring Physician:  Raya Cornell DO  Group:  [facility]  Interpreting Physician:  Tracy Velasco MD    SUMMARY    LEFT VENTRICLE:  Size was normal.  Systolic function was normal. Ejection fraction was estimated to be 60 %. There was mild concentric hypertrophy.   Left ventricular diastolic function parameters were normal.    RIGHT VENTRICLE:  The size was normal.  Systolic function was normal.    LEFT ATRIUM:  The atrium was mildly dilated. MITRAL VALVE:  There was trace regurgitation. AORTIC VALVE:  There was trace to mild regurgitation. TRICUSPID VALVE:  There was trace regurgitation. HISTORY: PRIOR HISTORY: Pre-DM, Pre-HTN, LE varicose veins    PROCEDURE: The study was performed in the [facility]. This was a routine study. The transthoracic approach was used. The study included complete 2D imaging, M-mode, complete spectral Doppler, and color Doppler. The  heart rate was 62 bpm, at the start of the study. Images were obtained from the parasternal, apical, subcostal, and suprasternal notch acoustic windows. Image quality was good. LEFT VENTRICLE: Size was normal. Systolic function was normal. Ejection fraction was estimated to be 60 %. There were no regional wall motion abnormalities. Wall thickness was mildly increased. There was mild concentric hypertrophy. DOPPLER: Left ventricular diastolic function parameters were normal.    RIGHT VENTRICLE: The size was normal. Systolic function was normal. Wall thickness was normal.    LEFT ATRIUM: The atrium was mildly dilated. RIGHT ATRIUM: Size was normal.    MITRAL VALVE: Valve structure was normal. There was normal leaflet separation. DOPPLER: The transmitral velocity was within the normal range. There was no evidence for stenosis. There was trace regurgitation. AORTIC VALVE: The valve was trileaflet. Leaflets exhibited normal thickness and normal cuspal separation. DOPPLER: Transaortic velocity was within the normal range. There was no evidence for stenosis. There was trace to mild regurgitation. TRICUSPID VALVE: The valve structure was normal. There was normal leaflet separation. DOPPLER: The transtricuspid velocity was within the normal range. There was no evidence for stenosis. There was trace regurgitation. The tricuspid jet  envelope definition was inadequate for estimation of RV systolic pressure. There are no indirect findings suggestive of moderate or severe pulmonary hypertension. PULMONIC VALVE: Leaflets exhibited normal thickness, no calcification, and normal cuspal separation. DOPPLER: The transpulmonic velocity was within the normal range. There was trace regurgitation. PERICARDIUM: There was no pericardial effusion. The pericardium was normal in appearance. AORTA: The root exhibited normal size. SYSTEMIC VEINS: IVC: The inferior vena cava was normal in size and course. Respirophasic changes were normal.    SYSTEM MEASUREMENT TABLES    2D  %FS: 41.98 %  Ao Diam: 3.65 cm  Ao asc: 3.15 cm  EDV(Teich): 82.8 ml  EF(Teich): 73.27 %  ESV(Teich): 22.13 ml  IVSd: 1.32 cm  LA Area: 21.44 cm2  LA Diam: 3.99 cm  LVEDV MOD A4C: 154.14 ml  LVEF MOD A4C: 71.07 %  LVESV MOD A4C: 44.6 ml  LVIDd: 4.29 cm  LVIDs: 2.49 cm  LVLd A4C: 9.14 cm  LVLs A4C: 7.44 cm  LVPWd: 1.27 cm  RA Area: 15.66 cm2  RVIDd: 2.68 cm  SV MOD A4C: 109.54 ml  SV(Teich): 60.67 ml    CW  TR MaxP.61 mmHg  TR Vmax: 2.04 m/s    MM  TAPSE: 2.21 cm    PW  E' Sept: 0.07 m/s  E/E' Sept: 9.99  MV A [place]: 0.52 m/s  MV Dec [place]: 2.41 m/s2  MV DecT: 275.42 ms  MV E Marcel[place]: 0.66 m/s  MV E/A Ratio: 1.27  MV PHT: 79.87 ms  MVA By PHT: 2.75 cm2    [employer] Accredited Echocardiography Laboratory    Prepared and electronically signed by    Yury Rm MD  Signed 2021 12:33:38    No results found for this or any previous visit. This note was completed in part utilizing [employer] direct voice recognition software. Grammatical errors, random word insertion, spelling mistakes, and incomplete sentences may be an occasional consequence of the system secondary to software limitations, ambient noise and hardware issues.  At the time of dictation, efforts were made to edit, clarify and /or correct errors. Please read the chart carefully and recognize, using context, where substitutions have occurred.   If you have any questions or concerns about the context, text or information contained within the body of this dictation, please contact myself, the provider, for further clarification

## 2023-09-11 NOTE — PROGRESS NOTES
Home Sleep Study Documentation    HOME STUDY DEVICE: Noxturnal no                                           Selena G3 yes      Pre-Sleep Home Study:    Set-up and instructions performed by: Juliane Haley performed demonstration for Patient: yes    Return demonstration performed by Patient: yes    Written instructions provided to Patient: yes    Patient signed consent form: yes        Post-Sleep Home Study:    Additional comments by Patient:     Home Sleep Study Failed:no:    Failure reason: N/A    Reported or Detected: N/A    Scored by: Sancho Dang

## 2023-09-12 ENCOUNTER — OFFICE VISIT (OUTPATIENT)
Dept: CARDIOLOGY CLINIC | Facility: CLINIC | Age: 58
End: 2023-09-12
Payer: COMMERCIAL

## 2023-09-12 ENCOUNTER — TELEPHONE (OUTPATIENT)
Dept: FAMILY MEDICINE CLINIC | Facility: CLINIC | Age: 58
End: 2023-09-12

## 2023-09-12 VITALS
HEART RATE: 61 BPM | OXYGEN SATURATION: 96 % | BODY MASS INDEX: 35.42 KG/M2 | WEIGHT: 247.4 LBS | DIASTOLIC BLOOD PRESSURE: 94 MMHG | HEIGHT: 70 IN | SYSTOLIC BLOOD PRESSURE: 129 MMHG

## 2023-09-12 DIAGNOSIS — R00.2 PALPITATIONS: ICD-10-CM

## 2023-09-12 DIAGNOSIS — G47.33 OSA (OBSTRUCTIVE SLEEP APNEA): ICD-10-CM

## 2023-09-12 DIAGNOSIS — E06.3 HYPOTHYROIDISM DUE TO HASHIMOTO'S THYROIDITIS: ICD-10-CM

## 2023-09-12 DIAGNOSIS — R73.01 IMPAIRED FASTING GLUCOSE: ICD-10-CM

## 2023-09-12 DIAGNOSIS — E03.8 HYPOTHYROIDISM DUE TO HASHIMOTO'S THYROIDITIS: ICD-10-CM

## 2023-09-12 DIAGNOSIS — I47.1 SVT (SUPRAVENTRICULAR TACHYCARDIA): Primary | ICD-10-CM

## 2023-09-12 PROCEDURE — 93000 ELECTROCARDIOGRAM COMPLETE: CPT | Performed by: NURSE PRACTITIONER

## 2023-09-12 PROCEDURE — 99215 OFFICE O/P EST HI 40 MIN: CPT | Performed by: NURSE PRACTITIONER

## 2023-09-12 NOTE — LETTER
2023     Rosa Acosta DO    Patient: Ari Higuera   YOB: 1965   Date of Visit: 2023       Dear Dr. Octaviano Hutchins:    Thank you for referring Ari Higuera to me for evaluation. Below are my notes for this consultation. If you have questions, please do not hesitate to call me. I look forward to following your patient along with you. Sincerely,        ANITA Floyd        CC: DO Naa Buchanan, [address] [place] [address]  2023  8:35 AM  Sign when Signing Visit  Cardiology  Office Visit Note  Ari Higuera   59 y.o.   male   MRN: [id number]  [facility]  [address] [facility] [address]  [phone]    PCP: Rosa Acosta DO  Cardiologist: Dr. Adilene Anthony            Summary of recommendations  -Discussed the benefit of Mediterranean/DASH diet, low carbohydrate diet for weight loss and optimal LDL  -Recommended statin therapy for prevention; he will discuss with Dr. Adilene Anthony in the future  -Referred to EP- Dr. Mildred Orellana consider SVT/AVNRTablation. There was a strip from his stress echo on 2021  -Discussed medical maneuvers  -He can take a as needed extra metoprolol if recurrent SVT. His heart rhythm remains elevated for greater than an hour, recommend ED eval  Follow up will be scheduled with Dr Adilene Anthony in Feb  Colon Ca screenin2015       Assessment/plan  SVT/AVNRT  Encouraged to remain well-hydrated  Continue metoprolol tartrate 12.5 mg every 12  EKG today normal sinus rhythm 61 bpm  He has had recurrence about 1 time a week for at least a year. Refer to EP  I encouraged him to try to obtain an EKG on his smart watch Or [employer] monitor  Palpitations  ADAIR, severe by home study 23. Treatment in progress  /94  Dyslipidemia.   Statin therapy is recommended given his coronary artery calcium score   Latest Reference Range & Units 20 13:15 21 08:40 22 12:11 22 11:21 03/22/23 08:00   Cholesterol See Comment mg/dL 172 187 193 157 188   Triglycerides See Comment mg/dL 71 54 71 108 102   HDL >=40 mg/dL 51 55 44 40 49   LDL Calculated 0 - 100 mg/dL 107 (H) 121 (H) 135 (H) 95 119 (H)     CAC score of 14  Impaired fasting glucose. Latest Reference Range & Units 06/24/21 08:40 06/08/22 12:11 09/09/22 11:21 03/22/23 08:00   Hemoglobin A1C Normal 3.8-5.6%; PreDiabetic 5.7-6.4%; Diabetic >=6.5%; Glycemic control for adults with diabetes <7.0% % 5.7 (H) 5.8 (H) 6.0 (H) 5.7 (H)       Hypothyroidism on replacement  Cardiac testing  CAC score 6/24/21  Left main coronary artery:  0  Left anterior descending coronary artery and diagonal branches:  14  Left circumflex coronary artery and left marginal branches:  0  Right coronary artery and right marginal branches:  0  Posterior descending coronary artery: 0  TOTAL coronary calcium score:  14  Exercise stress echo 6/24/2021  Duration of exercise was 13 min and 30 sec. Maximal work rate was 16.2 METs. Maximal heart rate during stress was 171 bpm ( 106 % of maximal predicted heart rate). Target heart rate was achieved. There was normal resting blood pressure with a hypertensive response to stress. There was no chest pain during stress. ECG CONCLUSIONS:The stress ECG was negative for ischemia. Arrhythmia during stress: supraventricular tachycardia (likely AVNRT) at peak stress that persisted for 2 minutes into recovery. BASELINE:There were no regional wall motion abnormalities. Overall left ventricular systolic function was normal.  PEAK STRESS:There were no regional wall motion abnormalities. There was an appropriate augmentation in LV function. ECHO CONCLUSIONS:There was no echocardiographic evidence for stress-induced ischemia. Zio 8/31/21  Patient had a min HR of 48 bpm, max HR of 128 bpm, and avg HR of 78bpm. Predominant underlying rhythm was Sinus Rhythm.  4  Supraventricular Tachycardia runs occurred, the run with the fastest  interval lasting 6 beats with a max rate of 128 bpm, the longest lasting 8beats with an avg rate of 110 bpm. Isolated SVEs were rare (<1.0%), SVE. Couplets were rare (<1.0%), and SVE Triplets were rare (<1.0%). Isolated VEs were rare (<1.0%, 59), VE Triplets were rare (<1.0%, 1), and no VECouplets were present. Impression:  1. Brief episodes of SVT - longest lasting 8 beats. 2. No symptoms associated with SVT. MORENITA Bermudez is a 78-year-old male with SVT, dyslipidemia and impaired fasting glucose. He has a coronary calcium score of 14. He follows with Dr. Caroline Siu following an executive physical work-up. During a stress echocardiogram he went into SVT, at 2 minutes of peak exercise. This suggested AVNRT. He has been maintained on low-dose metoprolol 12.5 mg every 12. He had a similar recurrence during a golf treatment, when he may have been dehydrated and had a couple of alcoholic beverages the night before. He did end up in the ED July 2022. Thus far he has been maintained on a heart healthy diet, and regular exercise regimen. Reassessment of his lipids requested. Consideration for statin therapy a couple times a week, in the future was recommended. His last office visit was 3/20/2023      9/11/23  Acute visit  Chief complaint palpitations  He reports he has had recurrent palpitations and high heart rates over the last year, occurring about once a week. [place] at the beach was in the 180s. He has tried some vagal maneuvers in the past.  He came down without medication. When he has the high heart rates he feels terrible. Otherwise, he walks several times a week, and he denies any exertional chest pain or shortness of breath. This was first identified on a stress echocardiogram for an executive physical with June 24, 2021. The patient does have a smart watch.   At this point he has not had an EKG from home that identifies the rhythm  After shared decision making he would like to sit or ablation. He will be referred to EP  In the meantime we discussed vagal maneuvers  We will take a as needed extra dose of metoprolol  We discussed when to go to the ED    I have spent 40 minutes with Patient  today in which greater than 50% of this time was spent in counseling/coordination of care regarding Instructions for management, Patient and family education, Importance of tx compliance, Risk factor reductions, Documenting in the medical record, Reviewing / ordering tests, medicine, procedures  , Obtaining or reviewing history   and Communicating with other healthcare professionals . Assessment  Diagnoses and all orders for this visit:    SVT (supraventricular tachycardia) (HCC)    ADAIR (obstructive sleep apnea)    Impaired fasting glucose    Palpitations          Past Medical History:   Diagnosis Date   • Allergic 2000   • Arthritis     r foot   • Hypothyroidism    • Infectious diarrhea     LAST ASSESSED: [id number]   • Overweight     LAST ASSESSED: [id number]   • Panic attacks     last one 4-5 months ago   • Paresthesias/numbness     LAST ASSESSED: [id number]   • Prediabetes     LAST ASSESSED: [id number]   • Prehypertension     LAST ASSESSED: [id number]   • Tinea versicolor     LAST ASSESSED: [id number]   • Varicose vein of leg     r leg   • Vitamin D deficiency     LAST ASSESSED: [id number]   • Wears glasses        Review of Systems   Constitutional: Negative for chills. Cardiovascular: Positive for palpitations. Negative for chest pain, claudication, cyanosis, dyspnea on exertion, irregular heartbeat, leg swelling, near-syncope, orthopnea, paroxysmal nocturnal dyspnea and syncope. Respiratory: Negative for cough and shortness of breath. Gastrointestinal: Negative for heartburn and nausea. Neurological: Negative for dizziness, focal weakness, headaches, light-headedness and weakness. All other systems reviewed and are negative. No Known Allergies  .     Current Outpatient Medications:   •  Cholecalciferol (VITAMIN D) 2000 units CAPS, Take 1 capsule by mouth daily in the early morning, Disp: , Rfl:   •  Fexofenadine HCl (ALLEGRA ALLERGY PO), Take by mouth (Patient not taking: Reported on 3/24/2023), Disp: , Rfl:   •  fluticasone (FLONASE) 50 mcg/act nasal spray, 1 spray into each nostril daily, Disp: , Rfl:   •  levothyroxine (Euthyrox) 75 mcg tablet, Take 1 tablet (75 mcg total) by mouth daily in the early morning, Disp: 90 tablet, Rfl: 3  •  metoprolol tartrate (LOPRESSOR) 25 mg tablet, TAKE 0.5 TABLETS (12.5 MG TOTAL) BY MOUTH EVERY 12 (TWELVE) HOURS, Disp: 30 tablet, Rfl: 6  •  multivitamin (THERAGRAN) TABS, Take 1 tablet by mouth daily in the early morning, Disp: , Rfl:         Social History     Socioeconomic History   • Marital status: /Civil Union     Spouse name: Not on file   • Number of children: Not on file   • Years of education: Not on file   • Highest education level: Not on file   Occupational History   • Not on file   Tobacco Use   • Smoking status: Never   • Smokeless tobacco: Never   Vaping Use   • Vaping Use: Never used   Substance and Sexual Activity   • Alcohol use:  Yes     Alcohol/week: 13.0 standard drinks of alcohol     Types: 10 Cans of beer, 3 Standard drinks or equivalent per week     Comment: SOCIAL   • Drug use: No   • Sexual activity: Yes     Partners: Female   Other Topics Concern   • Not on file   Social History Narrative   • Not on file     Social Determinants of Health     Financial Resource Strain: Not on file   Food Insecurity: Not on file   Transportation Needs: Not on file   Physical Activity: Not on file   Stress: Not on file   Social Connections: Not on file   Intimate Partner Violence: Not on file   Housing Stability: Not on file       Family History   Problem Relation Age of Onset   • Diabetes Mother    • Hypertension Mother    • Dementia Mother    • Arthritis Mother    • Prostate cancer Father    • Heart attack Father    • Stroke Father         Late 80’s   • Cancer Father         Prostate   • Diabetes Paternal Aunt        Physical Exam  Vitals and nursing note reviewed. Constitutional:       General: He is not in acute distress. HENT:      Head: Normocephalic and atraumatic. Eyes:      Conjunctiva/sclera: Conjunctivae normal.   Cardiovascular:      Rate and Rhythm: Normal rate and regular rhythm. Pulses: Intact distal pulses. Heart sounds: Normal heart sounds. Pulmonary:      Effort: Pulmonary effort is normal.      Breath sounds: Normal breath sounds. Abdominal:      General: Bowel sounds are normal.      Palpations: Abdomen is soft. Musculoskeletal:         General: Normal range of motion. Cervical back: Normal range of motion and neck supple. Skin:     General: Skin is warm and dry. Neurological:      Mental Status: He is alert and oriented to person, place, and time. Vitals: There were no vitals taken for this visit.    Wt Readings from Last 3 Encounters:   23 112 kg (248 lb)   23 112 kg (246 lb)   23 113 kg (249 lb)         Labs & Results:  Lab Results   Component Value Date    WBC 6.94 2022    HGB 14.6 2022    HCT 41.9 2022    MCV 87 2022     2022     No results found for: "BNP"  No components found for: "CHEM"  No results found for: "CKTOTAL", "TROPONINI", "Jeffrie Hitesh", "CKMBINDEX"  Results for orders placed during the hospital encounter of 21    Echo complete with contrast if indicated    Narrative  [address] [place] [address]  [phone]    Transthoracic Echocardiogram  2D, M-mode, Doppler, and Color Doppler    Study date:  2021    Patient: John Acuña  MR number: [id number]  Account number: [de-identified]  : 1965  Age: 60 years  Gender: Male  Status: Outpatient  Location: [facility]  Height: 70 in  Weight: 238 lb  BP: 128/ 78 mmHg    Indications: Encounter for preventive health examination    Diagnoses: Z00.00 - Encounter for general adult medical examination without abnormal findings    Sonographer:  SONIA Garcia  Primary Physician:  Audrey Flores DO  Referring Physician:  Jung Mccormick DO  Group:  [facility]  Interpreting Physician:  Elaine Woodard MD    SUMMARY    LEFT VENTRICLE:  Size was normal.  Systolic function was normal. Ejection fraction was estimated to be 60 %. There was mild concentric hypertrophy. Left ventricular diastolic function parameters were normal.    RIGHT VENTRICLE:  The size was normal.  Systolic function was normal.    LEFT ATRIUM:  The atrium was mildly dilated. MITRAL VALVE:  There was trace regurgitation. AORTIC VALVE:  There was trace to mild regurgitation. TRICUSPID VALVE:  There was trace regurgitation. HISTORY: PRIOR HISTORY: Pre-DM, Pre-HTN, LE varicose veins    PROCEDURE: The study was performed in the [facility]. This was a routine study. The transthoracic approach was used. The study included complete 2D imaging, M-mode, complete spectral Doppler, and color Doppler. The  heart rate was 62 bpm, at the start of the study. Images were obtained from the parasternal, apical, subcostal, and suprasternal notch acoustic windows. Image quality was good. LEFT VENTRICLE: Size was normal. Systolic function was normal. Ejection fraction was estimated to be 60 %. There were no regional wall motion abnormalities. Wall thickness was mildly increased. There was mild concentric hypertrophy. DOPPLER: Left ventricular diastolic function parameters were normal.    RIGHT VENTRICLE: The size was normal. Systolic function was normal. Wall thickness was normal.    LEFT ATRIUM: The atrium was mildly dilated. RIGHT ATRIUM: Size was normal.    MITRAL VALVE: Valve structure was normal. There was normal leaflet separation.  DOPPLER: The transmitral velocity was within the normal range. There was no evidence for stenosis. There was trace regurgitation. AORTIC VALVE: The valve was trileaflet. Leaflets exhibited normal thickness and normal cuspal separation. DOPPLER: Transaortic velocity was within the normal range. There was no evidence for stenosis. There was trace to mild regurgitation. TRICUSPID VALVE: The valve structure was normal. There was normal leaflet separation. DOPPLER: The transtricuspid velocity was within the normal range. There was no evidence for stenosis. There was trace regurgitation. The tricuspid jet  envelope definition was inadequate for estimation of RV systolic pressure. There are no indirect findings suggestive of moderate or severe pulmonary hypertension. PULMONIC VALVE: Leaflets exhibited normal thickness, no calcification, and normal cuspal separation. DOPPLER: The transpulmonic velocity was within the normal range. There was trace regurgitation. PERICARDIUM: There was no pericardial effusion. The pericardium was normal in appearance. AORTA: The root exhibited normal size. SYSTEMIC VEINS: IVC: The inferior vena cava was normal in size and course.  Respirophasic changes were normal.    SYSTEM MEASUREMENT TABLES    2D  %FS: 41.98 %  Ao Diam: 3.65 cm  Ao asc: 3.15 cm  EDV(Teich): 82.8 ml  EF(Teich): 73.27 %  ESV(Teich): 22.13 ml  IVSd: 1.32 cm  LA Area: 21.44 cm2  LA Diam: 3.99 cm  LVEDV MOD A4C: 154.14 ml  LVEF MOD A4C: 71.07 %  LVESV MOD A4C: 44.6 ml  LVIDd: 4.29 cm  LVIDs: 2.49 cm  LVLd A4C: 9.14 cm  LVLs A4C: 7.44 cm  LVPWd: 1.27 cm  RA Area: 15.66 cm2  RVIDd: 2.68 cm  SV MOD A4C: 109.54 ml  SV(Teich): 60.67 ml    CW  TR MaxP.61 mmHg  TR Vmax: 2.04 m/s    MM  TAPSE: 2.21 cm    PW  E' Sept: 0.07 m/s  E/E' Sept: 9.99  MV A [place]: 0.52 m/s  MV Dec [place]: 2.41 m/s2  MV DecT: 275.42 ms  MV E Marcelino: 0.66 m/s  MV E/A Ratio: 1.27  MV PHT: 79.87 ms  MVA By PHT: 2.75 cm2    [facility]    Prepared and electronically signed by    Diane Shen MD  Signed 24-Jun-2021 12:33:38    No results found for this or any previous visit. This note was completed in part utilizing [employer] direct voice recognition software. Grammatical errors, random word insertion, spelling mistakes, and incomplete sentences may be an occasional consequence of the system secondary to software limitations, ambient noise and hardware issues. At the time of dictation, efforts were made to edit, clarify and /or correct errors. Please read the chart carefully and recognize, using context, where substitutions have occurred.   If you have any questions or concerns about the context, text or information contained within the body of this dictation, please contact myself, the provider, for further clarification

## 2023-09-12 NOTE — PROGRESS NOTES
Home sleep study demonstrates severe obstructive sleep apnea. Auto CPAP was ordered by the interpreting sleep physician. We should arrange early follow-up in the office after he starts on CPAP.

## 2023-09-13 RX ORDER — LEVOTHYROXINE SODIUM 88 UG/1
88 TABLET ORAL
Qty: 30 TABLET | Refills: 1 | Status: SHIPPED | OUTPATIENT
Start: 2023-09-13

## 2023-09-13 NOTE — TELEPHONE ENCOUNTER
Please call patient and let him know the TSH was elevated which requires a higher dose of levothyroxine. I sent the higher dose to the pharmacy ( 88 mcg) and should repeat labs in 6-8 weeks.  Thank you

## 2023-09-21 ENCOUNTER — TELEPHONE (OUTPATIENT)
Dept: SLEEP CENTER | Facility: CLINIC | Age: 58
End: 2023-09-21

## 2023-09-21 NOTE — TELEPHONE ENCOUNTER
Patient left  stating he would like to speak to someone regarding a cpap machine that the doctor is recommending. Please advise.  [phone]

## 2023-09-21 NOTE — TELEPHONE ENCOUNTER
Patient of Dr. Padmini Ziegler's Jelani Corns to the patient advised sleep study results and that CPAP is recommended and ordered. Patient will call the pulmonary office to follow up.

## 2023-09-22 LAB

## 2023-09-22 NOTE — PROGRESS NOTES
Cpap order processed via [place] to [facility] in [address] [place] [address]. DME appt on 10/09/23, compliance FU 12/20/23.

## 2023-09-22 NOTE — TELEPHONE ENCOUNTER
Spoke with Patient, explained the process. Voiced understanding. Is scheduled for DME Set up on 10/09/23 w/Marcela at the [facility]. [facility] office, already scheduled with Dr. Mckenzie Sierra on 12/20/23 so this visit can also be compliance fu. Order has been processed via [facility] [place] to [address] [place] [address] in [place].

## 2023-09-26 ENCOUNTER — TELEPHONE (OUTPATIENT)
Dept: PULMONOLOGY | Facility: CLINIC | Age: 58
End: 2023-09-26

## 2023-10-06 LAB
DME PARACHUTE DELIVERY DATE EXPECTED: NORMAL
DME PARACHUTE DELIVERY DATE REQUESTED: NORMAL
DME PARACHUTE DELIVERY NOTE: NORMAL
DME PARACHUTE ITEM DESCRIPTION: NORMAL
DME PARACHUTE ORDER STATUS: NORMAL
DME PARACHUTE SUPPLIER NAME: NORMAL
DME PARACHUTE SUPPLIER PHONE: NORMAL

## 2023-10-09 ENCOUNTER — TELEPHONE (OUTPATIENT)
Dept: SLEEP CENTER | Facility: CLINIC | Age: 58
End: 2023-10-09

## 2023-10-09 DIAGNOSIS — G47.33 OBSTRUCTIVE SLEEP APNEA (ADULT) (PEDIATRIC): Primary | ICD-10-CM

## 2023-10-09 LAB

## 2023-10-09 NOTE — TELEPHONE ENCOUNTER
I set this pt. up in [facility] on a ResMed S11 5-20cm and gave the F30 FFM (M) mask. I requested a new Rx from the  For this FFM.

## 2023-10-11 ENCOUNTER — TELEPHONE (OUTPATIENT)
Dept: CARDIOLOGY CLINIC | Facility: CLINIC | Age: 58
End: 2023-10-11

## 2023-10-11 ENCOUNTER — CONSULT (OUTPATIENT)
Dept: CARDIOLOGY CLINIC | Facility: CLINIC | Age: 58
End: 2023-10-11
Payer: COMMERCIAL

## 2023-10-11 ENCOUNTER — PREP FOR PROCEDURE (OUTPATIENT)
Dept: CARDIOLOGY CLINIC | Facility: CLINIC | Age: 58
End: 2023-10-11

## 2023-10-11 VITALS
HEART RATE: 62 BPM | DIASTOLIC BLOOD PRESSURE: 74 MMHG | WEIGHT: 246.3 LBS | BODY MASS INDEX: 35.26 KG/M2 | SYSTOLIC BLOOD PRESSURE: 106 MMHG | HEIGHT: 70 IN

## 2023-10-11 DIAGNOSIS — R00.0 RAPID HEART RATE: Primary | ICD-10-CM

## 2023-10-11 DIAGNOSIS — I47.10 SVT (SUPRAVENTRICULAR TACHYCARDIA): Primary | ICD-10-CM

## 2023-10-11 PROCEDURE — 99245 OFF/OP CONSLTJ NEW/EST HI 55: CPT | Performed by: INTERNAL MEDICINE

## 2023-10-11 PROCEDURE — 93000 ELECTROCARDIOGRAM COMPLETE: CPT | Performed by: INTERNAL MEDICINE

## 2023-10-11 NOTE — PROGRESS NOTES
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 Has 35-40 episodes per year can last 30min-1 hr, can be rest or w activity, feels rapid HR, unwell. No syncope/CP. Last episode yesterday. On metoprolol 12.5mg bid            PLAN:  Recommend SVT abaltion. Risks and benefits discussed including but not limited to vascular or cardiac injury, bleeding, need for pacemaker. Estimate >90% chance of success  Will try to locate strips from stress echo. Orders Placed This Encounter   Procedures    POCT ECG     There are no discontinued medications. ............................................................................................. HPI/Subjective: 58 yo male, has had 10 years of symptoms, heart races, his wife says seems like he's having panic attack but he is not anxious. He has them at work and has to excuse himself. Had episode yesterday. Taking metoprolol with little benefit. Valsalva doesn't work . They last 30 min to 60 min. He had it during stress echo 2021 and confrimed read as AVNRT>   He is active walks 3 miles a day. Please note HPI is listed by problem with with update following it, it is copied again in the assessment above and reflects medical decision making as well. Complete 12 point ROS reviewed and otherwise non pertinent or negative except as per HPI pertinent positives in Cardiovascular and Respiratory emphasized.  Please see paper chart for outpatient clinic patients where the patient completed the 12 point ROS survey. Past Medical History:   Diagnosis Date    Allergic 2000    Arthritis     r foot    Hypothyroidism     Infectious diarrhea     LAST ASSESSED: [id number]    Overweight     LAST ASSESSED: [id number]    Panic attacks     last one 4-5 months ago    Paresthesias/numbness     LAST ASSESSED: [id number]    Prediabetes     LAST ASSESSED: [id number]    Prehypertension     LAST ASSESSED: [id number]    Tinea versicolor     LAST ASSESSED: [id number]    Varicose vein of leg     r leg    Vitamin D deficiency     LAST ASSESSED: [id number]    Wears glasses        No Known Allergies  I reviewed the Home Medication list and Allergies in the chart. Scheduled Meds:  Current Outpatient Medications   Medication Sig Dispense Refill    Cholecalciferol (VITAMIN D) 2000 units CAPS Take 1 capsule by mouth daily in the early morning      levothyroxine (Euthyrox) 88 mcg tablet Take 1 tablet (88 mcg total) by mouth daily in the early morning 30 tablet 1    multivitamin (THERAGRAN) TABS Take 1 tablet by mouth daily in the early morning      Fexofenadine HCl (ALLEGRA ALLERGY PO) Take by mouth (Patient not taking: Reported on 3/24/2023)      fluticasone (FLONASE) 50 mcg/act nasal spray 1 spray into each nostril daily (Patient not taking: Reported on 10/11/2023)      metoprolol tartrate (LOPRESSOR) 25 mg tablet TAKE 0.5 TABLETS (12.5 MG TOTAL) BY MOUTH EVERY 12 (TWELVE) HOURS 30 tablet 6     No current facility-administered medications for this visit.      PRN Meds:.        Family History   Problem Relation Age of Onset    Diabetes Mother     Hypertension Mother     Dementia Mother     Arthritis Mother     Prostate cancer Father     Heart attack Father     Stroke Father         Late 80’s    Cancer Father         Prostate    Diabetes Paternal Aunt        Social History     Socioeconomic History    Marital status: /Civil Union     Spouse name: Not on file    Number of children: Not on file    Years of education: Not on file    Highest education level: Not on file   Occupational History    Not on file   Tobacco Use    Smoking status: Never     Passive exposure: Never    Smokeless tobacco: Never   Vaping Use    Vaping Use: Never used   Substance and Sexual Activity    Alcohol use: Yes     Alcohol/week: 13.0 standard drinks of alcohol     Types: 10 Cans of beer, 3 Standard drinks or equivalent per week     Comment: SOCIAL    Drug use: No    Sexual activity: Yes     Partners: Female   Other Topics Concern    Not on file   Social History Narrative    Not on file     Social Determinants of Health     Financial Resource Strain: Not on file   Food Insecurity: Not on file   Transportation Needs: Not on file   Physical Activity: Not on file   Stress: Not on file   Social Connections: Not on file   Intimate Partner Violence: Not on file   Housing Stability: Not on file         OBJECTIVE:    /74 (BP Location: Left arm, Patient Position: Sitting, Cuff Size: Large)   Pulse 62   Ht 5' 10" (1.778 m)   Wt 112 kg (246 lb 4.8 oz)   BMI 35.34 kg/m²   Vitals:    10/11/23 1256   Weight: 112 kg (246 lb 4.8 oz)     GEN: No acute distress, Alert and oriented, well appearingHEENT:External ears normal, oral pharynx clear, mucous membranes moist  EYES: Pupils equal, sclera anicteric, midline, normal conjuctiva  NECK: No JVD, supple, no obvious masses or thryomegaly or goiter  CARDIOVASCULAR:  RRR, No murmur, rub, gallops S1,S2  LUNGS: Clear To auscultation bilaterally, normal effort, no rales, rhonchi, crackles   ABDOMEN:  nondistended,  without obvious organomegaly or ascites  EXTREMITIES/VASCULAR:  No edema. warm an well perfused. PSYCH: Normal Affect,  linear speech pattern without evidence of psychosis.    NEURO: Grossly intact, moving all extremiteis equal, face symmetric, alert and responsive, no obvious focal defecits   GAIT:  Ambulates normally without difficulty  HEME: No bleeding, bruising, petechia, purpura   SKIN: No significant rashes on visibile skin, warm, no diaphoresis or pallor. Lab Results:       LABS:      Chemistry        Component Value Date/Time     2017 0917    K 4.4 2023 0800    K 4.3 2017 0917     2023 0800     2017 0917    CO2 26 2023 0800    CO2 29 2017 0917    BUN 19 2023 0800    BUN 17 2017 0917    CREATININE 1.02 2023 0800    CREATININE 0.99 2017 0917        Component Value Date/Time    CALCIUM 9.2 2023 0800    CALCIUM 9.2 2017 0917    ALKPHOS 53 2023 0800    ALKPHOS 56 2017 0917    AST 22 2023 0800    AST 22 2017 0917    ALT 28 2023 0800    ALT 21 2017 0917    BILITOT 0.7 2017 0917            Lab Results   Component Value Date    CHOL 159 2017    CHOL 174 2014     Lab Results   Component Value Date    HDL 49 2023    HDL 40 2022    HDL 44 2022     Lab Results   Component Value Date    LDLCALC 119 (H) 2023    LDLCALC 95 2022    LDLCALC 135 (H) 2022     Lab Results   Component Value Date    TRIG 102 2023    TRIG 108 2022    TRIG 71 2022     No results found for: "CHOLHDL"    IMAGING: No results found.      Cardiac testing:   Results for orders placed during the hospital encounter of 21    Echo complete with contrast if indicated    Narrative  [address] [place] [address], [address] [place] [address]  [phone]    Transthoracic Echocardiogram  2D, M-mode, Doppler, and Color Doppler    Study date:  2021    Patient: Keo Carr  MR number: [id number]  Account number: [de-identified]  : 1965  Age: 60 years  Gender: Male  Status: Outpatient  Location: [address] [place] [address]  Height: 70 in  Weight: 238 lb  BP: 128/ 78 mmHg    Indications: Encounter for preventive health examination    Diagnoses: Z00.00 - Encounter for general adult medical examination without abnormal findings    Sonographer:  SONIA Del Cid  Primary Physician:  Jose Manuel Kunz DO  Referring Physician:  Ilia Cespedes DO  Group:  [facility]  Interpreting Physician:  Estrella Dash MD    SUMMARY    LEFT VENTRICLE:  Size was normal.  Systolic function was normal. Ejection fraction was estimated to be 60 %. There was mild concentric hypertrophy. Left ventricular diastolic function parameters were normal.    RIGHT VENTRICLE:  The size was normal.  Systolic function was normal.    LEFT ATRIUM:  The atrium was mildly dilated. MITRAL VALVE:  There was trace regurgitation. AORTIC VALVE:  There was trace to mild regurgitation. TRICUSPID VALVE:  There was trace regurgitation. HISTORY: PRIOR HISTORY: Pre-DM, Pre-HTN, LE varicose veins    PROCEDURE: The study was performed in the [facility]. This was a routine study. The transthoracic approach was used. The study included complete 2D imaging, M-mode, complete spectral Doppler, and color Doppler. The  heart rate was 62 bpm, at the start of the study. Images were obtained from the parasternal, apical, subcostal, and suprasternal notch acoustic windows. Image quality was good. LEFT VENTRICLE: Size was normal. Systolic function was normal. Ejection fraction was estimated to be 60 %. There were no regional wall motion abnormalities. Wall thickness was mildly increased. There was mild concentric hypertrophy. DOPPLER: Left ventricular diastolic function parameters were normal.    RIGHT VENTRICLE: The size was normal. Systolic function was normal. Wall thickness was normal.    LEFT ATRIUM: The atrium was mildly dilated. RIGHT ATRIUM: Size was normal.    MITRAL VALVE: Valve structure was normal. There was normal leaflet separation. DOPPLER: The transmitral velocity was within the normal range. There was no evidence for stenosis. There was trace regurgitation.     AORTIC VALVE: The valve was trileaflet. Leaflets exhibited normal thickness and normal cuspal separation. DOPPLER: Transaortic velocity was within the normal range. There was no evidence for stenosis. There was trace to mild regurgitation. TRICUSPID VALVE: The valve structure was normal. There was normal leaflet separation. DOPPLER: The transtricuspid velocity was within the normal range. There was no evidence for stenosis. There was trace regurgitation. The tricuspid jet  envelope definition was inadequate for estimation of RV systolic pressure. There are no indirect findings suggestive of moderate or severe pulmonary hypertension. PULMONIC VALVE: Leaflets exhibited normal thickness, no calcification, and normal cuspal separation. DOPPLER: The transpulmonic velocity was within the normal range. There was trace regurgitation. PERICARDIUM: There was no pericardial effusion. The pericardium was normal in appearance. AORTA: The root exhibited normal size. SYSTEMIC VEINS: IVC: The inferior vena cava was normal in size and course.  Respirophasic changes were normal.    SYSTEM MEASUREMENT TABLES    2D  %FS: 41.98 %  Ao Diam: 3.65 cm  Ao asc: 3.15 cm  EDV(Teich): 82.8 ml  EF(Teich): 73.27 %  ESV(Teich): 22.13 ml  IVSd: 1.32 cm  LA Area: 21.44 cm2  LA Diam: 3.99 cm  LVEDV MOD A4C: 154.14 ml  LVEF MOD A4C: 71.07 %  LVESV MOD A4C: 44.6 ml  LVIDd: 4.29 cm  LVIDs: 2.49 cm  LVLd A4C: 9.14 cm  LVLs A4C: 7.44 cm  LVPWd: 1.27 cm  RA Area: 15.66 cm2  RVIDd: 2.68 cm  SV MOD A4C: 109.54 ml  SV(Teich): 60.67 ml    CW  TR MaxP.61 mmHg  TR Vmax: 2.04 m/s    MM  TAPSE: 2.21 cm    PW  E' Sept: 0.07 m/s  E/E' Sept: 9.99  MV A [place]: 0.52 m/s  MV Dec [place]: 2.41 m/s2  MV DecT: 275.42 ms  MV E Marcelino: 0.66 m/s  MV E/A Ratio: 1.27  MV PHT: 79.87 ms  MVA By PHT: 2.75 cm2    [employer] Accredited Echocardiography Laboratory    Prepared and electronically signed by    Asya Marks MD  Signed 2021 12:33:38    No results found for this or any previous visit. No results found for this or any previous visit. No results found for this or any previous visit.           I reviewed and interpreted the following LABS/EKG/TELE/IMAGING and below is summary of my interpretation (if data available):        Current EKG and Rhythm Strip:NSR normal ekg    Past EKGs and RHYTHM strip:  HOLTER/EVENT Monitor:[facility] shows just brief episodes of PAT  Stress echo, echo portion normal 2021

## 2023-10-11 NOTE — TELEPHONE ENCOUNTER
Patient scheduled for SVT Ablation on 12/18/23 at [facility] with Dr. Evelia Beltran. Instructions given to patient in office. Medication hold Metoprolol 2 days prior. Can I have auth?

## 2023-10-12 ENCOUNTER — TELEPHONE (OUTPATIENT)
Dept: CARDIOLOGY CLINIC | Facility: CLINIC | Age: 58
End: 2023-10-12

## 2023-10-12 LAB
CHEST PAIN STATEMENT: NORMAL
DME PARACHUTE DELIVERY DATE ACTUAL: NORMAL
DME PARACHUTE DELIVERY DATE REQUESTED: NORMAL
DME PARACHUTE ITEM DESCRIPTION: NORMAL
DME PARACHUTE ORDER STATUS: NORMAL
DME PARACHUTE SUPPLIER NAME: NORMAL
DME PARACHUTE SUPPLIER PHONE: NORMAL
MAX DIASTOLIC BP: 96 MMHG
MAX HEART RATE: 171 BPM
MAX PREDICTED HEART RATE: 162 BPM
MAX. SYSTOLIC BP: 224 MMHG
PROTOCOL NAME: NORMAL
REASON FOR TERMINATION: NORMAL
TARGET HR FORMULA: NORMAL
TEST INDICATION: NORMAL
TIME IN EXERCISE PHASE: NORMAL

## 2023-10-13 DIAGNOSIS — I47.10 SVT (SUPRAVENTRICULAR TACHYCARDIA): Primary | ICD-10-CM

## 2023-10-13 NOTE — TELEPHONE ENCOUNTER
Per CBC online precert resource list [facility] is not required for a 2W SJK/67399 XT or a 1W FLORY/07789 XT.

## 2023-11-06 DIAGNOSIS — E03.8 HYPOTHYROIDISM DUE TO HASHIMOTO'S THYROIDITIS: ICD-10-CM

## 2023-11-06 DIAGNOSIS — E06.3 HYPOTHYROIDISM DUE TO HASHIMOTO'S THYROIDITIS: ICD-10-CM

## 2023-11-06 RX ORDER — LEVOTHYROXINE SODIUM 88 UG/1
88 TABLET ORAL
Qty: 30 TABLET | Refills: 0 | Status: SHIPPED | OUTPATIENT
Start: 2023-11-06

## 2023-11-10 ENCOUNTER — TELEPHONE (OUTPATIENT)
Dept: CARDIOLOGY CLINIC | Facility: CLINIC | Age: 58
End: 2023-11-10

## 2023-11-10 NOTE — TELEPHONE ENCOUNTER
Handychris Escudero, this is Anson Standing [birth year] my [birth year]. I have a heart monitor on. I had it on for two weeks. I had no issues the two weeks while I had this on. So I just want to let you know nothing happened for the last two weeks with my heart racing. [phone]. Just wondering, but what the next steps would be? OK. Thanks.  López.

## 2023-11-10 NOTE — TELEPHONE ENCOUNTER
Spoke with patient, I told him to finish out the monitor which is 2 more days and send back and then I would relay to you that he experienced very little to nothing while wearing the monitor, and that we would certainly call him when we received the results back.

## 2023-11-16 ENCOUNTER — CLINICAL SUPPORT (OUTPATIENT)
Dept: CARDIOLOGY CLINIC | Facility: CLINIC | Age: 58
End: 2023-11-16
Payer: COMMERCIAL

## 2023-11-16 DIAGNOSIS — I47.10 SVT (SUPRAVENTRICULAR TACHYCARDIA): ICD-10-CM

## 2023-11-16 PROCEDURE — 93248 EXT ECG>7D<15D REV&INTERPJ: CPT | Performed by: INTERNAL MEDICINE

## 2023-11-16 NOTE — RESULT ENCOUNTER NOTE
Patient had a min HR of 49 bpm, max HR of 148 bpm, and avg HR of 76 bpm. Predominant underlying rhythm was Sinus Rhythm. Slight P wave morphology changes were noted. 1 run of Ventricular Tachycardia occurred lasting 8 beats with a max rate of 122 bpm (avg 112 bpm). 7 Supraventricular Tachycardia runs occurred, the run with the fastest interval lasting 8 beats with a max rate of 126 bpm (avg 117 bpm); the run with the fastest interval was also the longest. Isolated SVEs were rare (<1.0%), SVE Couplets were rare (<1.0%), and SVE Triplets were rare (<1.0%). Isolated VEs were rare (<1.0%), and no VE Couplets or VE Triplets were present. Reviewed interrogation. No signs of AVRNT. HE does have brief Paroxysmal Atrial tachycardia. He felt no symptoms of SVT during this monitor. D/w patient.

## 2023-12-07 DIAGNOSIS — E03.8 HYPOTHYROIDISM DUE TO HASHIMOTO'S THYROIDITIS: ICD-10-CM

## 2023-12-07 DIAGNOSIS — E06.3 HYPOTHYROIDISM DUE TO HASHIMOTO'S THYROIDITIS: ICD-10-CM

## 2023-12-07 RX ORDER — LEVOTHYROXINE SODIUM 88 UG/1
88 TABLET ORAL
Qty: 30 TABLET | Refills: 5 | Status: SHIPPED | OUTPATIENT
Start: 2023-12-07

## 2023-12-11 ENCOUNTER — RA CDI HCC (OUTPATIENT)
Dept: OTHER | Facility: HOSPITAL | Age: 58
End: 2023-12-11

## 2023-12-11 NOTE — PROGRESS NOTES
[address] [facility] coding opportunities       Chart reviewed, no opportunity found: CHART REVIEWED, NO OPPORTUNITY FOUND        Patients Insurance        Commercial Insurance: Serjio Domínguez

## 2023-12-12 ENCOUNTER — APPOINTMENT (OUTPATIENT)
Dept: LAB | Facility: CLINIC | Age: 58
End: 2023-12-12
Payer: COMMERCIAL

## 2023-12-12 DIAGNOSIS — E06.3 HYPOTHYROIDISM DUE TO HASHIMOTO'S THYROIDITIS: ICD-10-CM

## 2023-12-12 DIAGNOSIS — I47.10 SVT (SUPRAVENTRICULAR TACHYCARDIA): ICD-10-CM

## 2023-12-12 DIAGNOSIS — E03.8 HYPOTHYROIDISM DUE TO HASHIMOTO'S THYROIDITIS: ICD-10-CM

## 2023-12-12 LAB
ALBUMIN SERPL BCP-MCNC: 4.7 G/DL (ref 3.5–5)
ALP SERPL-CCNC: 49 U/L (ref 34–104)
ALT SERPL W P-5'-P-CCNC: 24 U/L (ref 7–52)
ANION GAP SERPL CALCULATED.3IONS-SCNC: 9 MMOL/L
AST SERPL W P-5'-P-CCNC: 24 U/L (ref 13–39)
BASOPHILS # BLD AUTO: 0.03 THOUSANDS/ÂΜL (ref 0–0.1)
BASOPHILS NFR BLD AUTO: 0 % (ref 0–1)
BILIRUB SERPL-MCNC: 0.64 MG/DL (ref 0.2–1)
BUN SERPL-MCNC: 17 MG/DL (ref 5–25)
CALCIUM SERPL-MCNC: 10.8 MG/DL (ref 8.4–10.2)
CHLORIDE SERPL-SCNC: 100 MMOL/L (ref 96–108)
CO2 SERPL-SCNC: 29 MMOL/L (ref 21–32)
CREAT SERPL-MCNC: 0.84 MG/DL (ref 0.6–1.3)
EOSINOPHIL # BLD AUTO: 0.18 THOUSAND/ÂΜL (ref 0–0.61)
EOSINOPHIL NFR BLD AUTO: 2 % (ref 0–6)
ERYTHROCYTE [DISTWIDTH] IN BLOOD BY AUTOMATED COUNT: 12.6 % (ref 11.6–15.1)
GFR SERPL CREATININE-BSD FRML MDRD: 95 ML/MIN/1.73SQ M
GLUCOSE P FAST SERPL-MCNC: 119 MG/DL (ref 65–99)
HCT VFR BLD AUTO: 45 % (ref 36.5–49.3)
HGB BLD-MCNC: 15.5 G/DL (ref 12–17)
IMM GRANULOCYTES # BLD AUTO: 0.02 THOUSAND/UL (ref 0–0.2)
IMM GRANULOCYTES NFR BLD AUTO: 0 % (ref 0–2)
LYMPHOCYTES # BLD AUTO: 2.45 THOUSANDS/ÂΜL (ref 0.6–4.47)
LYMPHOCYTES NFR BLD AUTO: 33 % (ref 14–44)
MCH RBC QN AUTO: 30.1 PG (ref 26.8–34.3)
MCHC RBC AUTO-ENTMCNC: 34.4 G/DL (ref 31.4–37.4)
MCV RBC AUTO: 87 FL (ref 82–98)
MONOCYTES # BLD AUTO: 0.63 THOUSAND/ÂΜL (ref 0.17–1.22)
MONOCYTES NFR BLD AUTO: 9 % (ref 4–12)
NEUTROPHILS # BLD AUTO: 4.09 THOUSANDS/ÂΜL (ref 1.85–7.62)
NEUTS SEG NFR BLD AUTO: 56 % (ref 43–75)
NRBC BLD AUTO-RTO: 0 /100 WBCS
PLATELET # BLD AUTO: 285 THOUSANDS/UL (ref 149–390)
PMV BLD AUTO: 10.4 FL (ref 8.9–12.7)
POTASSIUM SERPL-SCNC: 4.3 MMOL/L (ref 3.5–5.3)
PROT SERPL-MCNC: 7 G/DL (ref 6.4–8.4)
RBC # BLD AUTO: 5.15 MILLION/UL (ref 3.88–5.62)
SODIUM SERPL-SCNC: 138 MMOL/L (ref 135–147)
TSH SERPL DL<=0.05 MIU/L-ACNC: 2.93 UIU/ML (ref 0.45–4.5)
WBC # BLD AUTO: 7.4 THOUSAND/UL (ref 4.31–10.16)

## 2023-12-12 PROCEDURE — 85025 COMPLETE CBC W/AUTO DIFF WBC: CPT

## 2023-12-12 PROCEDURE — 84443 ASSAY THYROID STIM HORMONE: CPT

## 2023-12-12 PROCEDURE — 80053 COMPREHEN METABOLIC PANEL: CPT

## 2023-12-12 PROCEDURE — 36415 COLL VENOUS BLD VENIPUNCTURE: CPT

## 2023-12-17 ENCOUNTER — ANESTHESIA EVENT (OUTPATIENT)
Dept: NON INVASIVE DIAGNOSTICS | Facility: HOSPITAL | Age: 58
End: 2023-12-17
Payer: COMMERCIAL

## 2023-12-18 ENCOUNTER — HOSPITAL ENCOUNTER (OUTPATIENT)
Facility: HOSPITAL | Age: 58
Setting detail: OUTPATIENT SURGERY
Discharge: HOME | End: 2023-12-18
Attending: INTERNAL MEDICINE | Admitting: INTERNAL MEDICINE
Payer: COMMERCIAL

## 2023-12-18 ENCOUNTER — ANESTHESIA (OUTPATIENT)
Dept: NON INVASIVE DIAGNOSTICS | Facility: HOSPITAL | Age: 58
End: 2023-12-18
Payer: COMMERCIAL

## 2023-12-18 VITALS
WEIGHT: 248 LBS | RESPIRATION RATE: 17 BRPM | TEMPERATURE: 96.9 F | HEART RATE: 73 BPM | SYSTOLIC BLOOD PRESSURE: 108 MMHG | BODY MASS INDEX: 34.72 KG/M2 | DIASTOLIC BLOOD PRESSURE: 61 MMHG | OXYGEN SATURATION: 96 % | HEIGHT: 71 IN

## 2023-12-18 DIAGNOSIS — Z86.79 S/P CATHETER ABLATION OF SLOW PATHWAY: Primary | ICD-10-CM

## 2023-12-18 DIAGNOSIS — I47.10 SVT (SUPRAVENTRICULAR TACHYCARDIA): ICD-10-CM

## 2023-12-18 DIAGNOSIS — Z98.890 S/P CATHETER ABLATION OF SLOW PATHWAY: Primary | ICD-10-CM

## 2023-12-18 LAB
ANION GAP SERPL CALCULATED.3IONS-SCNC: 7 MMOL/L
ATRIAL RATE: 56 BPM
ATRIAL RATE: 73 BPM
BUN SERPL-MCNC: 13 MG/DL (ref 5–25)
CALCIUM SERPL-MCNC: 9 MG/DL (ref 8.4–10.2)
CHLORIDE SERPL-SCNC: 106 MMOL/L (ref 96–108)
CO2 SERPL-SCNC: 26 MMOL/L (ref 21–32)
CREAT SERPL-MCNC: 0.84 MG/DL (ref 0.6–1.3)
ERYTHROCYTE [DISTWIDTH] IN BLOOD BY AUTOMATED COUNT: 12.7 % (ref 11.6–15.1)
GFR SERPL CREATININE-BSD FRML MDRD: 95 ML/MIN/1.73SQ M
GLUCOSE P FAST SERPL-MCNC: 112 MG/DL (ref 65–99)
GLUCOSE SERPL-MCNC: 112 MG/DL (ref 65–140)
HCT VFR BLD AUTO: 41.2 % (ref 36.5–49.3)
HGB BLD-MCNC: 14.3 G/DL (ref 12–17)
INR PPP: 1.05 (ref 0.84–1.19)
MCH RBC QN AUTO: 30.5 PG (ref 26.8–34.3)
MCHC RBC AUTO-ENTMCNC: 34.7 G/DL (ref 31.4–37.4)
MCV RBC AUTO: 88 FL (ref 82–98)
P AXIS: 26 DEGREES
P AXIS: 46 DEGREES
PLATELET # BLD AUTO: 249 THOUSANDS/UL (ref 149–390)
PMV BLD AUTO: 10 FL (ref 8.9–12.7)
POTASSIUM SERPL-SCNC: 3.7 MMOL/L (ref 3.5–5.3)
PR INTERVAL: 200 MS
PR INTERVAL: 200 MS
PROTHROMBIN TIME: 13.6 SECONDS (ref 11.6–14.5)
QRS AXIS: 19 DEGREES
QRS AXIS: 33 DEGREES
QRSD INTERVAL: 100 MS
QRSD INTERVAL: 98 MS
QT INTERVAL: 386 MS
QT INTERVAL: 406 MS
QTC INTERVAL: 391 MS
QTC INTERVAL: 425 MS
RBC # BLD AUTO: 4.69 MILLION/UL (ref 3.88–5.62)
SODIUM SERPL-SCNC: 139 MMOL/L (ref 135–147)
T WAVE AXIS: -10 DEGREES
T WAVE AXIS: 4 DEGREES
VENTRICULAR RATE: 56 BPM
VENTRICULAR RATE: 73 BPM
WBC # BLD AUTO: 6.07 THOUSAND/UL (ref 4.31–10.16)

## 2023-12-18 PROCEDURE — 93005 ELECTROCARDIOGRAM TRACING: CPT

## 2023-12-18 PROCEDURE — 93653 COMPRE EP EVAL TX SVT: CPT | Performed by: INTERNAL MEDICINE

## 2023-12-18 PROCEDURE — 80048 BASIC METABOLIC PNL TOTAL CA: CPT | Performed by: PHYSICIAN ASSISTANT

## 2023-12-18 PROCEDURE — C1730 CATH, EP, 19 OR FEW ELECT: HCPCS | Performed by: INTERNAL MEDICINE

## 2023-12-18 PROCEDURE — NC001 PR NO CHARGE: Performed by: PHYSICIAN ASSISTANT

## 2023-12-18 PROCEDURE — 76937 US GUIDE VASCULAR ACCESS: CPT | Performed by: INTERNAL MEDICINE

## 2023-12-18 PROCEDURE — 85610 PROTHROMBIN TIME: CPT | Performed by: PHYSICIAN ASSISTANT

## 2023-12-18 PROCEDURE — C1894 INTRO/SHEATH, NON-LASER: HCPCS | Performed by: INTERNAL MEDICINE

## 2023-12-18 PROCEDURE — C1733 CATH, EP, OTHR THAN COOL-TIP: HCPCS | Performed by: INTERNAL MEDICINE

## 2023-12-18 PROCEDURE — 93623 PRGRMD STIMJ&PACG IV RX NFS: CPT | Performed by: INTERNAL MEDICINE

## 2023-12-18 PROCEDURE — C1760 CLOSURE DEV, VASC: HCPCS | Performed by: INTERNAL MEDICINE

## 2023-12-18 PROCEDURE — 85027 COMPLETE CBC AUTOMATED: CPT | Performed by: PHYSICIAN ASSISTANT

## 2023-12-18 PROCEDURE — 93010 ELECTROCARDIOGRAM REPORT: CPT | Performed by: INTERNAL MEDICINE

## 2023-12-18 DEVICE — DVC VASC CLSR VASCADE MVP 6-12FR: Type: IMPLANTABLE DEVICE | Site: GROIN | Status: FUNCTIONAL

## 2023-12-18 RX ORDER — PROPOFOL 10 MG/ML
INJECTION, EMULSION INTRAVENOUS CONTINUOUS PRN
Status: DISCONTINUED | OUTPATIENT
Start: 2023-12-18 | End: 2023-12-18

## 2023-12-18 RX ORDER — PROPOFOL 10 MG/ML
INJECTION, EMULSION INTRAVENOUS AS NEEDED
Status: DISCONTINUED | OUTPATIENT
Start: 2023-12-18 | End: 2023-12-18

## 2023-12-18 RX ORDER — ACETAMINOPHEN 325 MG/1
650 TABLET ORAL EVERY 4 HOURS PRN
Status: DISCONTINUED | OUTPATIENT
Start: 2023-12-18 | End: 2023-12-18 | Stop reason: HOSPADM

## 2023-12-18 RX ORDER — HEPARIN SODIUM 1000 [USP'U]/ML
INJECTION, SOLUTION INTRAVENOUS; SUBCUTANEOUS CODE/TRAUMA/SEDATION MEDICATION
Status: DISCONTINUED | OUTPATIENT
Start: 2023-12-18 | End: 2023-12-18 | Stop reason: HOSPADM

## 2023-12-18 RX ORDER — MIDAZOLAM HYDROCHLORIDE 2 MG/2ML
INJECTION, SOLUTION INTRAMUSCULAR; INTRAVENOUS AS NEEDED
Status: DISCONTINUED | OUTPATIENT
Start: 2023-12-18 | End: 2023-12-18

## 2023-12-18 RX ORDER — SODIUM CHLORIDE 9 MG/ML
20 INJECTION, SOLUTION INTRAVENOUS CONTINUOUS
Status: DISCONTINUED | OUTPATIENT
Start: 2023-12-18 | End: 2023-12-18 | Stop reason: HOSPADM

## 2023-12-18 RX ORDER — LIDOCAINE HYDROCHLORIDE 10 MG/ML
INJECTION, SOLUTION EPIDURAL; INFILTRATION; INTRACAUDAL; PERINEURAL CODE/TRAUMA/SEDATION MEDICATION
Status: DISCONTINUED | OUTPATIENT
Start: 2023-12-18 | End: 2023-12-18 | Stop reason: HOSPADM

## 2023-12-18 RX ORDER — SODIUM CHLORIDE 9 MG/ML
20 INJECTION, SOLUTION INTRAVENOUS ONCE
Status: COMPLETED | OUTPATIENT
Start: 2023-12-18 | End: 2023-12-18

## 2023-12-18 RX ORDER — FENTANYL CITRATE 50 UG/ML
INJECTION, SOLUTION INTRAMUSCULAR; INTRAVENOUS AS NEEDED
Status: DISCONTINUED | OUTPATIENT
Start: 2023-12-18 | End: 2023-12-18

## 2023-12-18 RX ORDER — CEFAZOLIN SODIUM 1 G/3ML
INJECTION, POWDER, FOR SOLUTION INTRAMUSCULAR; INTRAVENOUS AS NEEDED
Status: DISCONTINUED | OUTPATIENT
Start: 2023-12-18 | End: 2023-12-18

## 2023-12-18 RX ADMIN — FENTANYL CITRATE 25 MCG: 50 INJECTION INTRAMUSCULAR; INTRAVENOUS at 12:18

## 2023-12-18 RX ADMIN — SODIUM CHLORIDE 20 ML/HR: 0.9 INJECTION, SOLUTION INTRAVENOUS at 10:20

## 2023-12-18 RX ADMIN — FENTANYL CITRATE 25 MCG: 50 INJECTION INTRAMUSCULAR; INTRAVENOUS at 12:05

## 2023-12-18 RX ADMIN — PROPOFOL 50 MG: 10 INJECTION, EMULSION INTRAVENOUS at 12:05

## 2023-12-18 RX ADMIN — MIDAZOLAM 1 MG: 1 INJECTION INTRAMUSCULAR; INTRAVENOUS at 11:33

## 2023-12-18 RX ADMIN — CEFAZOLIN 2000 MG: 1 INJECTION, POWDER, FOR SOLUTION INTRAMUSCULAR; INTRAVENOUS at 12:00

## 2023-12-18 RX ADMIN — PROPOFOL 60 MCG/KG/MIN: 10 INJECTION, EMULSION INTRAVENOUS at 11:33

## 2023-12-18 RX ADMIN — PROPOFOL 50 MG: 10 INJECTION, EMULSION INTRAVENOUS at 12:03

## 2023-12-18 RX ADMIN — SODIUM CHLORIDE: 0.9 INJECTION, SOLUTION INTRAVENOUS at 11:15

## 2023-12-18 RX ADMIN — PROPOFOL 30 MG: 10 INJECTION, EMULSION INTRAVENOUS at 13:15

## 2023-12-18 RX ADMIN — PROPOFOL 30 MG: 10 INJECTION, EMULSION INTRAVENOUS at 13:22

## 2023-12-18 RX ADMIN — MIDAZOLAM 1 MG: 1 INJECTION INTRAMUSCULAR; INTRAVENOUS at 11:39

## 2023-12-18 RX ADMIN — FENTANYL CITRATE 25 MCG: 50 INJECTION INTRAMUSCULAR; INTRAVENOUS at 11:34

## 2023-12-18 RX ADMIN — FENTANYL CITRATE 25 MCG: 50 INJECTION INTRAMUSCULAR; INTRAVENOUS at 12:31

## 2023-12-18 NOTE — ANESTHESIA POSTPROCEDURE EVALUATION
Post-Op Assessment Note    CV Status:  Stable  Pain Score: 0    Pain management: adequate       Mental Status:  Alert and awake   Hydration Status:  Euvolemic and stable   PONV Controlled:  Controlled   Airway Patency:  Patent and adequate     Post Op Vitals Reviewed: Yes      Staff: CRNA               BP  126/79    Temp      Pulse 84   Resp 16   SpO2 98% RA

## 2023-12-18 NOTE — Clinical Note
Site (pad location): flank and lateral thigh. Laterality: right. Grounding pad site assessment: skin integrity intact.

## 2023-12-18 NOTE — H&P
H&P Exam - Electrophysiology - Cardiology   Alexey Cummings 58 y.o. 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 Has 35-40 episodes per year can last 30min-1 hr, can be rest or w activity, feels rapid HR, unwell. No syncope/CP. Last episode yesterday. On metoprolol 12.5mg bid     Plan:  -- NPO for SVT ablation today  -- informed consent obtained  -- last dose of metoprolol was 5 days ago  -- bedrest post procedure   -- probable D/C home post procedure      History of Present Illness   HPI:  Alexey Cummings is a 58 y.o. male with a history of SVT who presents to [facility] for SVT ablation.     He has had intermittent symptoms for the past 10 years. He feels his heart racing and episodes last anywhere from 30 to 60 minutes. His wife says it seems like he's having a panic attack but he is not anxious. He has had them at work and has had to leave. He takes metoprolol with little benefit. Valsalva does not typically terminate the episodes. 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 Echocardiogram showed normal LVEF of 60%. He is active and walks 3 miles per day.     Treatment options were discussed and he elected to proceed with catheter ablation. Today, he is feeling well. He had an episode of palpitations yesterday that lasted several minutes. He denies chest pain, SOB, lightheadedness, dizziness, orthopnea, or PND. He stopped metoprolol 5 days ago. He is NPO for the procedure today.     EKG 12/18/2023: sinus bradycardia HR 56 bpm    ZioPatch 10/29/2023 - 11/12/2023:  Reviewed interrogation. No signs of AVRNT. HE does have brief paroxysmal atrial tachycardia. He felt no symptoms of SVT during this monitor.    ZioPatch 7/13/2023 - 7/20/2023:  1. Brief episodes of SVT - longest lasting 8 beats.  2. No symptoms  associated with SVT.    Stress Echo 6/24/2021: There was no echocardiographic evidence for stress-induced ischemia.     Echo 6/24/2021: LVEF 60%    Review of Systems   Constitutional: Negative.    HENT: Negative.     Eyes: Negative.    Respiratory:  Negative for cough and shortness of breath.    Cardiovascular:  Positive for palpitations. Negative for chest pain.   Gastrointestinal: Negative.    Endocrine: Negative.    Genitourinary: Negative.    Musculoskeletal: Negative.    Skin:  Negative for rash.   Allergic/Immunologic: Negative.    Neurological:  Negative for dizziness and syncope.   Hematological: Negative.    Psychiatric/Behavioral: Negative.       ROS as noted above, otherwise 12 point review of systems was performed and is negative.     Historical Information   Past Medical History:   Diagnosis Date    Allergic 2000    Arthritis     r foot    Hypothyroidism     Infectious diarrhea     LAST ASSESSED: 25NOV2012    Overweight     LAST ASSESSED: 13MAR2017    Panic attacks     last one 4-5 months ago    Paresthesias/numbness     LAST ASSESSED: [id number]    Prediabetes     LAST ASSESSED: [id number]    Prehypertension     LAST ASSESSED: [id number]    Tinea versicolor     LAST ASSESSED: 13MAR2017    Varicose vein of leg     r leg    Vitamin D deficiency     LAST ASSESSED: [id number]    Wears glasses      Past Surgical History:   Procedure Laterality Date    APPENDECTOMY      LAST ASSESSED: [id number]    CHEILECTOMY Right 12/1/2017    Procedure: CHEILECTOMY;  Surgeon: Kenneth Baugh DPM;  Location: [facility] OR;  Service: Podiatry    HIP PINNING Left     as a child    HIP SURGERY      LAST ASSESSED: [id number]     Family History:   Family History   Problem Relation Age of Onset    Diabetes Mother     Hypertension Mother     Dementia Mother     Arthritis Mother     Prostate cancer Father     Heart attack Father     Stroke Father         Late 80’s    Cancer Father         Prostate    Diabetes Paternal Aunt        Social  "History   Social History     Substance and Sexual Activity   Alcohol Use Yes    Alcohol/week: 13.0 standard drinks of alcohol    Types: 10 Cans of beer, 3 Standard drinks or equivalent per week    Comment: SOCIAL     Social History     Substance and Sexual Activity   Drug Use No     Social History     Tobacco Use   Smoking Status Never    Passive exposure: Never   Smokeless Tobacco Never       Meds/Allergies   all medications and allergies reviewed  Home Medications:   Medications Prior to Admission   Medication    Cholecalciferol (VITAMIN D) 2000 units CAPS    levothyroxine 88 mcg tablet    metoprolol tartrate (LOPRESSOR) 25 mg tablet    multivitamin (THERAGRAN) TABS       No Known Allergies    Objective   Vitals: Blood pressure 150/79, pulse 63, temperature 98.2 °F (36.8 °C), temperature source Temporal, resp. rate 17, height 5' 11\" (1.803 m), weight 112 kg (248 lb), SpO2 98%.  Orthostatic Blood Pressures      Flowsheet Row Most Recent Value   Blood Pressure 150/79 filed at 12/18/2023 1016            No intake or output data in the 24 hours ending 12/18/23 1111    Invasive Devices       Peripheral Intravenous Line  Duration             Peripheral IV 12/18/23 Distal;Right;Upper;Ventral (anterior) Arm <1 day    Peripheral IV 12/18/23 Left;Ventral (anterior) Forearm <1 day              Airway  Duration             Supraglottic Airway LMA 4 2208 days                    Physical Exam   GEN: NAD, alert and oriented, well appearing  SKIN: dry without significant lesions or rashes  HEENT: NCAT, PERRL, EOMs intact  NECK: No JVD or carotid bruits appreciated  CARDIOVASCULAR: RRR, normal S1, S2 without murmurs, rubs, or gallops appreciated  LUNGS: Clear to auscultation bilaterally without wheezes, rhonchi, or rales  ABDOMEN: Soft, nontender, nondistended  EXTREMITIES/VASCULAR: perfused without clubbing, cyanosis, or edema b/l  PSYCH: Normal mood and affect  NEURO: CN ll-Xll grossly intact    Lab Results: I have personally " reviewed pertinent lab results.    Results from last 7 days   Lab Units 23  1017 23  0855   WBC Thousand/uL 6.07 7.40   HEMOGLOBIN g/dL 14.3 15.5   HEMATOCRIT % 41.2 45.0   PLATELETS Thousands/uL 249 285     Results from last 7 days   Lab Units 23  1017 23  0855   POTASSIUM mmol/L 3.7 4.3   CHLORIDE mmol/L 106 100   CO2 mmol/L 26 29   BUN mg/dL 13 17   CREATININE mg/dL 0.84 0.84   CALCIUM mg/dL 9.0 10.8*     Results from last 7 days   Lab Units 23  1017   INR  1.05       Imaging: I have personally reviewed pertinent reports.    Results for orders placed during the hospital encounter of 21    Echo complete with contrast if indicated    [facility]  [address] [place] [address]  [phone]    Transthoracic Echocardiogram  2D, M-mode, Doppler, and Color Doppler    Study date:  2021    Patient: RONNY GRANDA  MR number: [id number]  Account number: [id number]  : 1965  Age: 56 years  Gender: Male  Status: Outpatient  Location: [facility]  Height: 70 in  Weight: 238 lb  BP: 128/ 78 mmHg    Indications: Encounter for preventive health examination    Diagnoses: Z00.00 - Encounter for general adult medical examination without abnormal findings    Sonographer:  SONIA Reina  Primary Physician:  Carlos Barron DO  Referring Physician:  Mitch Bertrand DO  Group:  [facility]  Interpreting Physician:  Fly Mcbride MD    SUMMARY    LEFT VENTRICLE:  Size was normal.  Systolic function was normal. Ejection fraction was estimated to be 60 %.  There was mild concentric hypertrophy.  Left ventricular diastolic function parameters were normal.    RIGHT VENTRICLE:  The size was normal.  Systolic function was normal.    LEFT ATRIUM:  The atrium was mildly dilated.    MITRAL VALVE:  There was trace regurgitation.    AORTIC VALVE:  There was trace to mild regurgitation.    TRICUSPID  VALVE:  There was trace regurgitation.    HISTORY: PRIOR HISTORY: Pre-DM, Pre-HTN, LE varicose veins    PROCEDURE: The study was performed in the [facility]. This was a routine study. The transthoracic approach was used. The study included complete 2D imaging, M-mode, complete spectral Doppler, and color Doppler. The  heart rate was 62 bpm, at the start of the study. Images were obtained from the parasternal, apical, subcostal, and suprasternal notch acoustic windows. Image quality was good.    LEFT VENTRICLE: Size was normal. Systolic function was normal. Ejection fraction was estimated to be 60 %. There were no regional wall motion abnormalities. Wall thickness was mildly increased. There was mild concentric hypertrophy.  DOPPLER: Left ventricular diastolic function parameters were normal.    RIGHT VENTRICLE: The size was normal. Systolic function was normal. Wall thickness was normal.    LEFT ATRIUM: The atrium was mildly dilated.    RIGHT ATRIUM: Size was normal.    MITRAL VALVE: Valve structure was normal. There was normal leaflet separation. DOPPLER: The transmitral velocity was within the normal range. There was no evidence for stenosis. There was trace regurgitation.    AORTIC VALVE: The valve was trileaflet. Leaflets exhibited normal thickness and normal cuspal separation. DOPPLER: Transaortic velocity was within the normal range. There was no evidence for stenosis. There was trace to mild regurgitation.    TRICUSPID VALVE: The valve structure was normal. There was normal leaflet separation. DOPPLER: The transtricuspid velocity was within the normal range. There was no evidence for stenosis. There was trace regurgitation. The tricuspid jet  envelope definition was inadequate for estimation of RV systolic pressure. There are no indirect findings suggestive of moderate or severe pulmonary hypertension.    PULMONIC VALVE: Leaflets exhibited normal thickness, no calcification, and normal  cuspal separation. DOPPLER: The transpulmonic velocity was within the normal range. There was trace regurgitation.    PERICARDIUM: There was no pericardial effusion. The pericardium was normal in appearance.    AORTA: The root exhibited normal size.    SYSTEMIC VEINS: IVC: The inferior vena cava was normal in size and course. Respirophasic changes were normal.    SYSTEM MEASUREMENT TABLES    2D  %FS: 41.98 %  Ao Diam: 3.65 cm  Ao asc: 3.15 cm  EDV(Teich): 82.8 ml  EF(Teich): 73.27 %  ESV(Teich): 22.13 ml  IVSd: 1.32 cm  LA Area: 21.44 cm2  LA Diam: 3.99 cm  LVEDV MOD A4C: 154.14 ml  LVEF MOD A4C: 71.07 %  LVESV MOD A4C: 44.6 ml  LVIDd: 4.29 cm  LVIDs: 2.49 cm  LVLd A4C: 9.14 cm  LVLs A4C: 7.44 cm  LVPWd: 1.27 cm  RA Area: 15.66 cm2  RVIDd: 2.68 cm  SV MOD A4C: 109.54 ml  SV(Teich): 60.67 ml    CW  TR MaxP.61 mmHg  TR Vmax: 2.04 m/s    MM  TAPSE: 2.21 cm    PW  E' Sept: 0.07 m/s  E/E' Sept: 9.99  MV A Marcelino: 0.52 m/s  MV Dec [place]: 2.41 m/s2  MV DecT: 275.42 ms  MV E Marcelino: 0.66 m/s  MV E/A Ratio: 1.27  MV PHT: 79.87 ms  MVA By PHT: 2.75 cm2    [employer] Accredited Echocardiography Laboratory    Prepared and electronically signed by    Fly Mcbride MD  Signed 2021 12:33:38      Code Status: Level 1 - Full Code    ** Please Note: Dictation voice to text software may have been used in the creation of this document. **

## 2023-12-18 NOTE — Clinical Note
Defib pad site: left lower flank and right upper scapula.Defib pad site assessment: skin integrity intact.

## 2023-12-18 NOTE — DISCHARGE INSTR - AVS FIRST PAGE
PLEASE NOTE THE FOLLOWING MEDICATION RECOMMENDATIONS:  Discontinue metoprolol       RESTRICTIONS:  No heavy lifting or strenuous activity for one week.    No soaking in a bath tub/hot tub/swimming pool for one week or until groin heals. You may shower - please let soap and water run over the groins, no scrubbing, and pat the area dry. Please place band-aid on groins daily for up to five days, but you may remove sooner if no issues are noted.     If you notice ongoing bleeding, swelling, or firm lumps in groin near ablation incision, please contact Dr. Navarrete' office - [phone].

## 2023-12-18 NOTE — ANESTHESIA PREPROCEDURE EVALUATION
Procedure:   Cardiac eps/svt ablation (Chest)    Review of Systems/Medical History  Patient summary reviewed.  Chart reviewed.      Cardiovascular  EKG reviewed., Negative cardio ROS Exercise tolerance (METS): >4 METS.     Pulmonary  Negative pulmonary ROS        GI/Hepatic  Negative GI/hepatic ROS          Negative  ROS        Endo/Other  Negative endo/other ROS    Obesity    GYN       Hematology  Negative hematology ROS ,     Musculoskeletal       Neurology  Negative neurology ROS ,     Psychology    Anxiety                Physical Exam    Airway    Mallampati score: I  TM Distance: >3 FB  Neck ROM: full     Dental   No notable dental hx     Cardiovascular  Comment: Negative ROS, Rhythm: regular, Rate: normal, Cardiovascular exam normal    Pulmonary  Pulmonary exam normal     Other Findings        Anesthesia Plan  ASA Score- 3     Anesthesia Type- IV sedation with anesthesia with ASA Monitors.         Additional Monitors:     Airway Plan:            Plan Factors-Exercise tolerance (METS): >4 METS.    Chart reviewed. EKG reviewed.  Existing labs reviewed. Patient summary reviewed.    Patient is not a current smoker.              Induction- intravenous.    Postoperative Plan-     Informed Consent- Anesthetic plan and risks discussed with patient and spouse.  I personally reviewed this patient with the CRNA. Discussed and agreed on the Anesthesia Plan with the CRNA..

## 2023-12-19 ENCOUNTER — OFFICE VISIT (OUTPATIENT)
Dept: FAMILY MEDICINE CLINIC | Facility: CLINIC | Age: 58
End: 2023-12-19
Payer: COMMERCIAL

## 2023-12-19 VITALS
WEIGHT: 254.8 LBS | HEART RATE: 90 BPM | OXYGEN SATURATION: 98 % | DIASTOLIC BLOOD PRESSURE: 70 MMHG | SYSTOLIC BLOOD PRESSURE: 130 MMHG | RESPIRATION RATE: 17 BRPM | TEMPERATURE: 97.1 F | BODY MASS INDEX: 36.48 KG/M2 | HEIGHT: 70 IN

## 2023-12-19 DIAGNOSIS — I47.10 SVT (SUPRAVENTRICULAR TACHYCARDIA): ICD-10-CM

## 2023-12-19 DIAGNOSIS — G47.33 OSA (OBSTRUCTIVE SLEEP APNEA): ICD-10-CM

## 2023-12-19 DIAGNOSIS — E06.3 HYPOTHYROIDISM DUE TO HASHIMOTO'S THYROIDITIS: ICD-10-CM

## 2023-12-19 DIAGNOSIS — R73.01 IMPAIRED FASTING GLUCOSE: Primary | ICD-10-CM

## 2023-12-19 DIAGNOSIS — E03.8 HYPOTHYROIDISM DUE TO HASHIMOTO'S THYROIDITIS: ICD-10-CM

## 2023-12-19 DIAGNOSIS — E78.5 MILD HYPERLIPIDEMIA: ICD-10-CM

## 2023-12-19 DIAGNOSIS — Z23 ENCOUNTER FOR IMMUNIZATION: ICD-10-CM

## 2023-12-19 PROCEDURE — 90686 IIV4 VACC NO PRSV 0.5 ML IM: CPT

## 2023-12-19 PROCEDURE — 90471 IMMUNIZATION ADMIN: CPT

## 2023-12-19 PROCEDURE — 99213 OFFICE O/P EST LOW 20 MIN: CPT | Performed by: FAMILY MEDICINE

## 2023-12-19 NOTE — ASSESSMENT & PLAN NOTE
Lab Results   Component Value Date    HGBA1C 5.7 (H) 03/22/2023     -Stable.  Continue working on low sugar, low-carb, whole food, high fiber eating.  -Discussed increasing activity for goal of under 50 minutes moderate since exercise 5 days/week.  Recommend adding at least 2 days of resistance exercise.  -Follow-up in 6 months.

## 2023-12-19 NOTE — ASSESSMENT & PLAN NOTE
- Stable.  Patient now using CPAP every night and tolerating well.  Reports sleeping much more sound and not waking up as much.

## 2023-12-19 NOTE — PROGRESS NOTES
Name: Alexey Cummings      : 1965      MRN: [id number]  Encounter Provider: Mode Foley DO  Encounter Date: 2023   Encounter department: [facility]    Assessment & Plan     1. Impaired fasting glucose  Assessment & Plan:  Lab Results   Component Value Date    HGBA1C 5.7 (H) 2023     -Stable.  Continue working on low sugar, low-carb, whole food, high fiber eating.  -Discussed increasing activity for goal of under 50 minutes moderate since exercise 5 days/week.  Recommend adding at least 2 days of resistance exercise.  -Follow-up in 6 months.    Orders:  -     HEMOGLOBIN A1C W/ EAG ESTIMATION; Future  -     Comprehensive metabolic panel; Future    2. Hypothyroidism due to Hashimoto's thyroiditis  Assessment & Plan:  Stable.  Continue levothyroxine 88 mcg every morning.  Repeat TSH in 6 months.    Orders:  -     TSH, 3rd generation with Free T4 reflex; Future    3. Encounter for immunization  -     influenza vaccine, quadrivalent, 0.5 mL, preservative-free, for adult and pediatric patients 6 mos+ (AFLURIA, FLUARIX, FLULAVAL, FLUZONE)    4. Mild hyperlipidemia  -     Lipid Panel with Direct LDL reflex; Future    5. ADAIR (obstructive sleep apnea)  Assessment & Plan:  - Stable.  Patient now using CPAP every night and tolerating well.  Reports sleeping much more sound and not waking up as much.      6. SVT (supraventricular tachycardia)  Assessment & Plan:  - Status post ablation on 2023.  Denies any recurrence of palpitations.  Following with cardiology.          Depression Screening and Follow-up Plan: Patient was screened for depression during today's encounter. They screened negative with a PHQ-2 score of 0.        Carla Delgado is a 58-year-old male with past medical history of hypothyroidism, prediabetes, hyperlipidemia, SVT who presents today for follow-up.  He notes overall he is doing very well.  He is status post ablation on 2023 without any recurrence  of palpitations.  Notes he continues to follow with cardiology and very optimistic about results thus far.  He is otherwise taking all of his medications as prescribed without any side effects.  Denies any unwanted weight loss, night sweats.  Denies any fevers, chills, nausea, vomiting, diarrhea.  He has been working on changing his dietary habits.  He is staying active as tolerated right now and looking forward to continue activity when cleared.  No other concerns today.      Review of Systems   Constitutional:  Negative for chills and fever.   HENT:  Negative for ear pain and sore throat.    Eyes:  Negative for pain and visual disturbance.   Respiratory:  Negative for cough and shortness of breath.    Cardiovascular:  Negative for chest pain and palpitations.   Gastrointestinal:  Negative for abdominal pain and vomiting.   Genitourinary:  Negative for dysuria and hematuria.   Musculoskeletal:  Negative for arthralgias and back pain.   Skin:  Negative for color change and rash.   Neurological:  Negative for seizures and syncope.   Psychiatric/Behavioral:  Negative for confusion. The patient is not nervous/anxious.    All other systems reviewed and are negative.      Past Medical History:   Diagnosis Date    Allergic 2000    Arthritis     r foot    Hypothyroidism     Infectious diarrhea     LAST ASSESSED: 25NOV2012    Overweight     LAST ASSESSED: 13MAR2017    Panic attacks     last one 4-5 months ago    Paresthesias/numbness     LAST ASSESSED: [id number]    Prediabetes     LAST ASSESSED: [id number]    Prehypertension     LAST ASSESSED: 04DEC2014    s/p AVNRT ablation with slow pathway modification 12/18/2023 12/18/2023    Tinea versicolor     LAST ASSESSED: 13MAR2017    Varicose vein of leg     r leg    Vitamin D deficiency     LAST ASSESSED: [id number]    Wears glasses      Past Surgical History:   Procedure Laterality Date    APPENDECTOMY      LAST ASSESSED: [id number]    CARDIAC ELECTROPHYSIOLOGY PROCEDURE N/A  12/18/2023    Procedure: Cardiac eps/svt ablation;  Surgeon: Mahendra Navarrete MD;  Location: [facility];  Service: Cardiology    CHEILECTOMY Right 12/1/2017    Procedure: CHEILECTOMY;  Surgeon: Kenneth Baugh DPM;  Location: [facility];  Service: Podiatry    HIP PINNING Left     as a child    HIP SURGERY      LAST ASSESSED: [id number]     Family History   Problem Relation Age of Onset    Diabetes Mother     Hypertension Mother     Dementia Mother     Arthritis Mother     Prostate cancer Father     Heart attack Father     Stroke Father         Late 80’s    Cancer Father         Prostate    Diabetes Paternal Aunt      Social History     Socioeconomic History    Marital status: /Civil Union     Spouse name: None    Number of children: None    Years of education: None    Highest education level: None   Occupational History    None   Tobacco Use    Smoking status: Never     Passive exposure: Never    Smokeless tobacco: Never   Vaping Use    Vaping status: Never Used   Substance and Sexual Activity    Alcohol use: Yes     Alcohol/week: 13.0 standard drinks of alcohol     Types: 10 Cans of beer, 3 Standard drinks or equivalent per week     Comment: SOCIAL    Drug use: No    Sexual activity: Yes     Partners: Female   Other Topics Concern    None   Social History Narrative    None     Social Determinants of Health     Financial Resource Strain: Not on file   Food Insecurity: Not on file   Transportation Needs: Not on file   Physical Activity: Not on file   Stress: Not on file   Social Connections: Not on file   Intimate Partner Violence: Not on file   Housing Stability: Not on file     Current Outpatient Medications on File Prior to Visit   Medication Sig    levothyroxine 88 mcg tablet TAKE 1 TABLET (88 MCG TOTAL) BY MOUTH DAILY IN THE EARLY MORNING    multivitamin (THERAGRAN) TABS Take 1 tablet by mouth daily in the early morning    Cholecalciferol (VITAMIN D) 2000 units CAPS Take 1 capsule by mouth daily in  "the early morning (Patient not taking: Reported on 12/19/2023)     No Known Allergies  Immunization History   Administered Date(s) Administered    INFLUENZA 10/14/2021, 10/20/2022    Influenza Injectable, MDCK, Preservative Free, Quadrivalent, 0.5 mL 11/14/2019    Influenza, injectable, quadrivalent, preservative free 0.5 mL 11/05/2018, 10/26/2020, 12/19/2023    Tdap 12/04/2014, 06/12/2023    Zoster Vaccine Recombinant 10/26/2020       Objective     /70 (BP Location: Left arm, Patient Position: Sitting, Cuff Size: Standard)   Pulse 90   Temp (!) 97.1 °F (36.2 °C) (Tympanic)   Resp 17   Ht 5' 10\" (1.778 m)   Wt 116 kg (254 lb 12.8 oz)   SpO2 98%   BMI 36.56 kg/m²     Physical Exam  Vitals and nursing note reviewed.   Constitutional:       General: He is not in acute distress.     Appearance: Normal appearance. He is not ill-appearing.   HENT:      Head: Normocephalic and atraumatic.      Right Ear: Tympanic membrane and external ear normal.      Left Ear: Tympanic membrane normal.      Nose: Nose normal. No congestion.      Mouth/Throat:      Mouth: Mucous membranes are moist.      Pharynx: No oropharyngeal exudate.   Eyes:      Extraocular Movements: Extraocular movements intact.      Conjunctiva/sclera: Conjunctivae normal.      Pupils: Pupils are equal, round, and reactive to light.   Cardiovascular:      Rate and Rhythm: Normal rate and regular rhythm.      Pulses: Normal pulses.      Heart sounds: Normal heart sounds. No murmur heard.  Pulmonary:      Effort: Pulmonary effort is normal.      Breath sounds: Normal breath sounds. No wheezing, rhonchi or rales.   Abdominal:      General: Bowel sounds are normal.      Palpations: Abdomen is soft.      Tenderness: There is no abdominal tenderness. There is no guarding or rebound.   Musculoskeletal:         General: Normal range of motion.      Cervical back: Normal range of motion.      Right lower leg: No edema.      Left lower leg: No edema. "   Lymphadenopathy:      Cervical: No cervical adenopathy.   Skin:     General: Skin is warm.      Capillary Refill: Capillary refill takes less than 2 seconds.      Findings: No erythema.   Neurological:      General: No focal deficit present.      Mental Status: He is alert and oriented to person, place, and time.      Cranial Nerves: No cranial nerve deficit.      Motor: No weakness.   Psychiatric:         Mood and Affect: Mood normal.         Behavior: Behavior normal.       Mode Foley, DO

## 2023-12-19 NOTE — ASSESSMENT & PLAN NOTE
- Status post ablation on 12/18/2023.  Denies any recurrence of palpitations.  Following with cardiology.

## 2023-12-20 ENCOUNTER — OFFICE VISIT (OUTPATIENT)
Dept: PULMONOLOGY | Facility: CLINIC | Age: 58
End: 2023-12-20
Payer: COMMERCIAL

## 2023-12-20 VITALS
TEMPERATURE: 96.9 F | DIASTOLIC BLOOD PRESSURE: 80 MMHG | OXYGEN SATURATION: 98 % | WEIGHT: 255.6 LBS | HEART RATE: 66 BPM | BODY MASS INDEX: 36.59 KG/M2 | SYSTOLIC BLOOD PRESSURE: 138 MMHG | HEIGHT: 70 IN

## 2023-12-20 DIAGNOSIS — G47.33 OSA (OBSTRUCTIVE SLEEP APNEA): Primary | ICD-10-CM

## 2023-12-20 PROCEDURE — 99212 OFFICE O/P EST SF 10 MIN: CPT | Performed by: INTERNAL MEDICINE

## 2023-12-20 NOTE — ASSESSMENT & PLAN NOTE
Has been on therapy slightly more than 2 months.  He feels that this is going well.  Daytime sleepiness is improved.  Nocturia is improved.  Had trouble with headache.  His wife reports much less snoring than before.  Overall is he is happy with the therapy.  Compliance shows use more than 6 hours per night on the average and residual AHI of 1.8 events per hour of CPAP use which indicates adequate therapy.  No technical problems in the use of the mask or the machine.  Advised to continue.  I would assume that the sleep apnea is not directly related to his problems with atrial tachycardia that occurred during the day.  Call if problems or questions.

## 2023-12-20 NOTE — PROGRESS NOTES
Answers submitted by the patient for this visit:  Pulmonology Questionnaire (Submitted on 12/20/2023)  Chief Complaint: Primary symptoms  Chronicity: recurrent  When did you first notice your symptoms?: more than 1 year ago  How often do your symptoms occur?: daily  Since you first noticed this problem, how has it changed?: rapidly improving  Have you had a change in appetite?: No  Do you have chest pain?: No  Do you have shortness of breath that occurs with effort or exertion?: No  Do you have ear congestion?: No  Do you have ear pain?: No  Do you have a fever?: No  Do you have headaches?: No  Do you have heartburn?: No  Do you have fatigue?: No  Do you have muscle pain?: No  Do you have nasal congestion?: No  Do you have shortness of breath when lying flat?: No  Do you have shortness of breath when you wake up?: No  Do you have post-nasal drip?: No  Do you have a runny nose?: No  Do you have sneezing?: No  Do you have a sore throat?: No  Do you have sweats?: No  Do you have trouble swallowing?: No  Have you experienced weight loss?: No  Which of the following makes your symptoms worse?: pollen  Which of the following makes your symptoms better?: nothing  Assessment/Plan:    ADAIR (obstructive sleep apnea)  Has been on therapy slightly more than 2 months.  He feels that this is going well.  Daytime sleepiness is improved.  Nocturia is improved.  Had trouble with headache.  His wife reports much less snoring than before.  Overall is he is happy with the therapy.  Compliance shows use more than 6 hours per night on the average and residual AHI of 1.8 events per hour of CPAP use which indicates adequate therapy.  No technical problems in the use of the mask or the machine.  Advised to continue.  I would assume that the sleep apnea is not directly related to his problems with atrial tachycardia that occurred during the day.  Call if problems or questions.     Diagnoses and all orders for this visit:    ADAIR (obstructive  "sleep apnea)          Subjective:      Patient ID: Alexey Cummings is a 58 y.o. male.    Returns for reevaluation of obstructive sleep apnea, new to CPAP therapy.  He has been on therapy slightly more than 2 months.  He senses that daytime sleepiness is improved.  Nocturia is improved.  Snoring is improved, according to his wife.  Overall he is happy with the therapy.  Technical problems and the use of the device.  Measured compliance is excellent with residual AHI of 1.8 events per hour of CPAP use.  This indicates adequate therapy.  Advised to continue.  I am assuming that sleep apnea has little to do with his previous problem with atrial tachycardia.  He does report that he had ablation therapy done earlier this week.  Will meet again in a year for review.  10-minute visit all with discussion.    primary symptoms  Pertinent negatives include no chest pain, fever, headaches, myalgias or sore throat.       The following portions of the patient's history were reviewed and updated as appropriate: allergies, current medications, past family history, past medical history, past social history, past surgical history and problem list.    Review of Systems   Constitutional:  Negative for activity change, appetite change, fever and unexpected weight change.   HENT:  Negative for ear pain, postnasal drip, rhinorrhea, sneezing, sore throat and trouble swallowing.    Respiratory:  Positive for apnea.    Cardiovascular:  Negative for chest pain and palpitations.   Genitourinary:  Negative for enuresis.   Musculoskeletal:  Negative for myalgias.   Neurological:  Negative for headaches.         Objective:      /80 (BP Location: Left arm, Patient Position: Sitting, Cuff Size: Standard)   Pulse 66   Temp (!) 96.9 °F (36.1 °C) (Tympanic)   Ht 5' 10\" (1.778 m)   Wt 116 kg (255 lb 9.6 oz)   SpO2 98%   BMI 36.67 kg/m²          Physical Exam  Vitals reviewed.   Constitutional:       General: He is not in acute distress.     " Appearance: He is obese. He is not ill-appearing.   Neurological:      Mental Status: He is alert and oriented to person, place, and time.   Psychiatric:         Mood and Affect: Mood normal.         Behavior: Behavior normal.

## 2023-12-26 ENCOUNTER — TELEPHONE (OUTPATIENT)
Dept: CARDIOLOGY CLINIC | Facility: CLINIC | Age: 58
End: 2023-12-26

## 2023-12-26 NOTE — TELEPHONE ENCOUNTER
Pt called because the area on his groin where he is accessed for the ablation started getting red and swollen since this morning, it is itchy but he hasn't noticed any liquid oozing. Pt took his temp today and he has no fevers or chills. I attached the photo that he sent us in the message so that It can be reviewed through here. He would like to know if this is normal because up until now he hadn't presented this.

## 2023-12-26 NOTE — TELEPHONE ENCOUNTER
Yeah, Hi. My name is Danny Hernandez. Date of birth is [birth year]. I had a procedure done last Monday by Doctor Landon for SVT and the port area where they will do the procedure skin and swollen and start getting itchy a couple days ago. Now it looks like it's it's a little red and swollen, so it's been itchy. So I was just wondering if that's normal or what to expect as it wasn't swollen before it was flat. So can you give me a call back [phone]? Thank you.

## 2023-12-27 ENCOUNTER — CLINICAL SUPPORT (OUTPATIENT)
Dept: CARDIOLOGY CLINIC | Facility: CLINIC | Age: 58
End: 2023-12-27

## 2023-12-27 DIAGNOSIS — Z98.890 S/P CATHETER ABLATION OF SLOW PATHWAY: Primary | ICD-10-CM

## 2023-12-27 DIAGNOSIS — Z86.79 S/P CATHETER ABLATION OF SLOW PATHWAY: Primary | ICD-10-CM

## 2023-12-27 PROCEDURE — RECHECK: Performed by: PHYSICIAN ASSISTANT

## 2023-12-27 NOTE — TELEPHONE ENCOUNTER
Duplicate message. Already spoke to the pt yesterday. He's coming in for a site check today, 12/27/23.

## 2023-12-27 NOTE — PROGRESS NOTES
Pt presents to the office under the direction of Dr. Navarrete for a site check of 12/18/23 AVNRT ablation. Pt denies any fever, chills or cardiac symptoms. Pt states his right groin site started to itch on 12/23/23 and became red on 12/26/23.

## 2024-01-02 ENCOUNTER — TELEPHONE (OUTPATIENT)
Age: 59
End: 2024-01-02

## 2024-01-02 NOTE — TELEPHONE ENCOUNTER
Pt called to ask if he could get more than a 30-day supply for his levothyroxine. Chart shows 5 refills available on the last prescription sent to [facility]:    E-Prescribing Status: Receipt confirmed by pharmacy (12/7/2023  9:24 AM EST)     I suggested he call to see if [facility] can pull from those refills to make a 90-day. If not, we can send a new 90-day script.

## 2024-01-15 ENCOUNTER — APPOINTMENT (OUTPATIENT)
Dept: LAB | Facility: CLINIC | Age: 59
End: 2024-01-15
Payer: COMMERCIAL

## 2024-01-15 ENCOUNTER — OFFICE VISIT (OUTPATIENT)
Dept: FAMILY MEDICINE CLINIC | Facility: CLINIC | Age: 59
End: 2024-01-15
Payer: COMMERCIAL

## 2024-01-15 VITALS
TEMPERATURE: 96.5 F | HEIGHT: 70 IN | DIASTOLIC BLOOD PRESSURE: 72 MMHG | OXYGEN SATURATION: 98 % | BODY MASS INDEX: 35.63 KG/M2 | SYSTOLIC BLOOD PRESSURE: 118 MMHG | WEIGHT: 248.9 LBS | RESPIRATION RATE: 16 BRPM | HEART RATE: 64 BPM

## 2024-01-15 DIAGNOSIS — K21.9 GASTROESOPHAGEAL REFLUX DISEASE WITHOUT ESOPHAGITIS: ICD-10-CM

## 2024-01-15 DIAGNOSIS — R10.11 RUQ PAIN: ICD-10-CM

## 2024-01-15 DIAGNOSIS — R10.11 RUQ PAIN: Primary | ICD-10-CM

## 2024-01-15 LAB
ALBUMIN SERPL BCP-MCNC: 4.5 G/DL (ref 3.5–5)
ALP SERPL-CCNC: 48 U/L (ref 34–104)
ALT SERPL W P-5'-P-CCNC: 18 U/L (ref 7–52)
ANION GAP SERPL CALCULATED.3IONS-SCNC: 7 MMOL/L
AST SERPL W P-5'-P-CCNC: 32 U/L (ref 13–39)
BASOPHILS # BLD AUTO: 0.04 THOUSANDS/ÂΜL (ref 0–0.1)
BASOPHILS NFR BLD AUTO: 1 % (ref 0–1)
BILIRUB DIRECT SERPL-MCNC: 0.13 MG/DL (ref 0–0.2)
BILIRUB SERPL-MCNC: 0.56 MG/DL (ref 0.2–1)
BUN SERPL-MCNC: 17 MG/DL (ref 5–25)
CALCIUM SERPL-MCNC: 9.7 MG/DL (ref 8.4–10.2)
CHLORIDE SERPL-SCNC: 101 MMOL/L (ref 96–108)
CO2 SERPL-SCNC: 30 MMOL/L (ref 21–32)
CREAT SERPL-MCNC: 0.89 MG/DL (ref 0.6–1.3)
EOSINOPHIL # BLD AUTO: 0.13 THOUSAND/ÂΜL (ref 0–0.61)
EOSINOPHIL NFR BLD AUTO: 2 % (ref 0–6)
ERYTHROCYTE [DISTWIDTH] IN BLOOD BY AUTOMATED COUNT: 12.4 % (ref 11.6–15.1)
GFR SERPL CREATININE-BSD FRML MDRD: 92 ML/MIN/1.73SQ M
GLUCOSE SERPL-MCNC: 84 MG/DL (ref 65–140)
HCT VFR BLD AUTO: 43.1 % (ref 36.5–49.3)
HGB BLD-MCNC: 14.7 G/DL (ref 12–17)
IMM GRANULOCYTES # BLD AUTO: 0.02 THOUSAND/UL (ref 0–0.2)
IMM GRANULOCYTES NFR BLD AUTO: 0 % (ref 0–2)
LIPASE SERPL-CCNC: 31 U/L (ref 11–82)
LYMPHOCYTES # BLD AUTO: 2.12 THOUSANDS/ÂΜL (ref 0.6–4.47)
LYMPHOCYTES NFR BLD AUTO: 24 % (ref 14–44)
MCH RBC QN AUTO: 29.3 PG (ref 26.8–34.3)
MCHC RBC AUTO-ENTMCNC: 34.1 G/DL (ref 31.4–37.4)
MCV RBC AUTO: 86 FL (ref 82–98)
MONOCYTES # BLD AUTO: 0.65 THOUSAND/ÂΜL (ref 0.17–1.22)
MONOCYTES NFR BLD AUTO: 7 % (ref 4–12)
NEUTROPHILS # BLD AUTO: 5.81 THOUSANDS/ÂΜL (ref 1.85–7.62)
NEUTS SEG NFR BLD AUTO: 66 % (ref 43–75)
NRBC BLD AUTO-RTO: 0 /100 WBCS
PLATELET # BLD AUTO: 285 THOUSANDS/UL (ref 149–390)
PMV BLD AUTO: 10.4 FL (ref 8.9–12.7)
POTASSIUM SERPL-SCNC: 3.7 MMOL/L (ref 3.5–5.3)
PROT SERPL-MCNC: 7.2 G/DL (ref 6.4–8.4)
RBC # BLD AUTO: 5.01 MILLION/UL (ref 3.88–5.62)
SODIUM SERPL-SCNC: 138 MMOL/L (ref 135–147)
WBC # BLD AUTO: 8.77 THOUSAND/UL (ref 4.31–10.16)

## 2024-01-15 PROCEDURE — 36415 COLL VENOUS BLD VENIPUNCTURE: CPT

## 2024-01-15 PROCEDURE — 85025 COMPLETE CBC W/AUTO DIFF WBC: CPT

## 2024-01-15 PROCEDURE — 82248 BILIRUBIN DIRECT: CPT

## 2024-01-15 PROCEDURE — 80053 COMPREHEN METABOLIC PANEL: CPT

## 2024-01-15 PROCEDURE — 99213 OFFICE O/P EST LOW 20 MIN: CPT | Performed by: FAMILY MEDICINE

## 2024-01-15 PROCEDURE — 83690 ASSAY OF LIPASE: CPT

## 2024-01-15 NOTE — ASSESSMENT & PLAN NOTE
- Patient has right upper quadrant pain that started last week.  Cannot associate with any eating however it does wax and wane often rating to his back.  -Denies any nausea, vomiting, bloating, belching, diarrhea, fevers, chills.  -Did have massage last week thinking it may be coming from thoracic back pain which helped in the back but did not help his right upper quadrant abdominal pain/burning sensation that continues today.  -Discussed could still be coming from thoracic back pain however will rule out other etiologies.

## 2024-01-15 NOTE — PROGRESS NOTES
Name: Alexey Cummings      : 1965      MRN: [id number]  Encounter Provider: Mode Foley DO  Encounter Date: 1/15/2024   Encounter department: [facility]    Assessment & Plan     1. RUQ pain  Assessment & Plan:  - Patient has right upper quadrant pain that started last week.  Cannot associate with any eating however it does wax and wane often rating to his back.  -Denies any nausea, vomiting, bloating, belching, diarrhea, fevers, chills.  -Did have massage last week thinking it may be coming from thoracic back pain which helped in the back but did not help his right upper quadrant abdominal pain/burning sensation that continues today.  -Discussed could still be coming from thoracic back pain however will rule out other etiologies.      Orders:  -     Comprehensive metabolic panel; Future  -     Bilirubin, direct; Future  -     US right upper quadrant; Future; Expected date: 01/15/2024  -     CBC and differential; Future  -     Lipase; Future    2. Gastroesophageal reflux disease without esophagitis  Assessment & Plan:  - Discussed possibility that burning sensation could be reflux mediated.    -Will workup as above.  -Recommend trial of Pepcid 20 mg every morning for now.  -Antireflux measures discussed  -Follow-up as needed.             Subjective     Abdominal Pain  This is a new problem. The current episode started 1 to 4 weeks ago. The onset quality is sudden. The problem occurs 2 to 4 times per day. The most recent episode lasted 1 hours. The problem has been waxing and waning. The pain is located in the RUQ. The pain is at a severity of 5/10. The quality of the pain is burning. The abdominal pain radiates to the RUQ. Associated symptoms include myalgias. Pertinent negatives include no anorexia, arthralgias, belching, constipation, diarrhea, dysuria, fever, flatus, frequency, headaches, hematochezia, hematuria, melena, nausea, vomiting or weight loss. Nothing aggravates the pain.  The pain is relieved by Nothing.     Review of Systems   Constitutional:  Negative for chills, fever and weight loss.   HENT:  Negative for congestion, ear pain and sore throat.    Eyes:  Negative for pain and visual disturbance.   Respiratory:  Negative for cough, chest tightness, shortness of breath and wheezing.    Cardiovascular:  Negative for chest pain and palpitations.   Gastrointestinal:  Positive for abdominal pain. Negative for anorexia, blood in stool, constipation, diarrhea, flatus, hematochezia, melena, nausea and vomiting.   Genitourinary:  Negative for dysuria, frequency and hematuria.   Musculoskeletal:  Positive for back pain and myalgias. Negative for arthralgias and neck stiffness.   Skin:  Negative for color change and rash.   Neurological:  Negative for seizures, syncope and headaches.   Psychiatric/Behavioral:  Negative for confusion and sleep disturbance. The patient is not nervous/anxious.    All other systems reviewed and are negative.      Past Medical History:   Diagnosis Date    Allergic 2000    Arthritis     r foot    Hypothyroidism     Infectious diarrhea     LAST ASSESSED: [id number]    Overweight     LAST ASSESSED: 13MAR2017    Panic attacks     last one 4-5 months ago    Paresthesias/numbness     LAST ASSESSED: [id number]    Prediabetes     LAST ASSESSED: [id number]    Prehypertension     LAST ASSESSED: [id number]    s/p AVNRT ablation with slow pathway modification 12/18/2023 12/18/2023    Tinea versicolor     LAST ASSESSED: 13MAR2017    Varicose vein of leg     r leg    Vitamin D deficiency     LAST ASSESSED: [id number]    Wears glasses      Past Surgical History:   Procedure Laterality Date    APPENDECTOMY      LAST ASSESSED: [id number]    CARDIAC ELECTROPHYSIOLOGY PROCEDURE N/A 12/18/2023    Procedure: Cardiac eps/svt ablation;  Surgeon: Mahendra Navarrete MD;  Location: [facility];  Service: Cardiology    CHEILECTOMY Right 12/1/2017    Procedure: CHEILECTOMY;  Surgeon: Kenneth  CINTIA Baugh;  Location: [facility];  Service: Podiatry    HIP PINNING Left     as a child    HIP SURGERY      LAST ASSESSED: [id number]     Family History   Problem Relation Age of Onset    Diabetes Mother     Hypertension Mother     Dementia Mother     Arthritis Mother     Prostate cancer Father     Heart attack Father     Stroke Father         Late 80’s    Cancer Father         Prostate    Diabetes Paternal Aunt      Social History     Socioeconomic History    Marital status: /Civil Union     Spouse name: None    Number of children: None    Years of education: None    Highest education level: None   Occupational History    None   Tobacco Use    Smoking status: Never     Passive exposure: Never    Smokeless tobacco: Never   Vaping Use    Vaping status: Never Used   Substance and Sexual Activity    Alcohol use: Yes     Alcohol/week: 13.0 standard drinks of alcohol     Types: 10 Cans of beer, 3 Standard drinks or equivalent per week     Comment: SOCIAL    Drug use: No    Sexual activity: Yes     Partners: Female   Other Topics Concern    None   Social History Narrative    None     Social Determinants of Health     Financial Resource Strain: Not on file   Food Insecurity: Not on file   Transportation Needs: Not on file   Physical Activity: Not on file   Stress: Not on file   Social Connections: Not on file   Intimate Partner Violence: Not on file   Housing Stability: Not on file     Current Outpatient Medications on File Prior to Visit   Medication Sig    Cholecalciferol (VITAMIN D) 2000 units CAPS Take 1 capsule by mouth daily in the early morning    levothyroxine 88 mcg tablet TAKE 1 TABLET (88 MCG TOTAL) BY MOUTH DAILY IN THE EARLY MORNING    multivitamin (THERAGRAN) TABS Take 1 tablet by mouth daily in the early morning     No Known Allergies  Immunization History   Administered Date(s) Administered    INFLUENZA 10/14/2021, 10/20/2022    Influenza Injectable, MDCK, Preservative Free, Quadrivalent, 0.5 mL  "11/14/2019    Influenza, injectable, quadrivalent, preservative free 0.5 mL 11/05/2018, 10/26/2020, 12/19/2023    Tdap 12/04/2014, 06/12/2023    Zoster Vaccine Recombinant 10/26/2020       Objective     /72 (BP Location: Left arm, Patient Position: Sitting, Cuff Size: Standard)   Pulse 64   Temp (!) 96.5 °F (35.8 °C) (Tympanic)   Resp 16   Ht 5' 10\" (1.778 m)   Wt 113 kg (248 lb 14.4 oz)   SpO2 98%   BMI 35.71 kg/m²     Physical Exam  Vitals and nursing note reviewed.   Constitutional:       General: He is not in acute distress.     Appearance: Normal appearance. He is not ill-appearing.   HENT:      Head: Normocephalic and atraumatic.      Right Ear: Tympanic membrane and external ear normal.      Left Ear: Tympanic membrane normal.      Nose: Nose normal. No congestion.      Mouth/Throat:      Mouth: Mucous membranes are moist.      Pharynx: No oropharyngeal exudate.   Eyes:      Extraocular Movements: Extraocular movements intact.      Conjunctiva/sclera: Conjunctivae normal.      Pupils: Pupils are equal, round, and reactive to light.   Cardiovascular:      Rate and Rhythm: Normal rate and regular rhythm.      Pulses: Normal pulses.      Heart sounds: Normal heart sounds. No murmur heard.  Pulmonary:      Effort: Pulmonary effort is normal.      Breath sounds: Normal breath sounds. No wheezing, rhonchi or rales.   Abdominal:      General: Abdomen is flat. Bowel sounds are normal. There is no distension.      Palpations: Abdomen is soft. There is no hepatomegaly or splenomegaly.      Tenderness: There is abdominal tenderness in the right upper quadrant, right lower quadrant, epigastric area, suprapubic area, left upper quadrant and left lower quadrant. There is no guarding or rebound. Negative signs include Sawant's sign, Rovsing's sign, McBurney's sign, psoas sign and obturator sign.   Musculoskeletal:         General: Normal range of motion.      Cervical back: Normal range of motion.      Right " lower leg: No edema.      Left lower leg: No edema.   Lymphadenopathy:      Cervical: No cervical adenopathy.   Skin:     General: Skin is warm.      Capillary Refill: Capillary refill takes less than 2 seconds.      Findings: No erythema.   Neurological:      General: No focal deficit present.      Mental Status: He is alert and oriented to person, place, and time.      Cranial Nerves: No cranial nerve deficit.      Motor: No weakness.   Psychiatric:         Mood and Affect: Mood normal.         Behavior: Behavior normal.       Mode Foley, DO

## 2024-01-16 ENCOUNTER — TELEPHONE (OUTPATIENT)
Age: 59
End: 2024-01-16

## 2024-01-16 ENCOUNTER — HOSPITAL ENCOUNTER (OUTPATIENT)
Dept: ULTRASOUND IMAGING | Facility: HOSPITAL | Age: 59
Discharge: HOME | End: 2024-01-16
Attending: FAMILY MEDICINE
Payer: COMMERCIAL

## 2024-01-16 DIAGNOSIS — R10.11 RUQ PAIN: ICD-10-CM

## 2024-01-16 PROCEDURE — 76705 ECHO EXAM OF ABDOMEN: CPT

## 2024-01-17 PROBLEM — K21.9 GASTROESOPHAGEAL REFLUX DISEASE WITHOUT ESOPHAGITIS: Status: ACTIVE | Noted: 2024-01-17

## 2024-01-17 NOTE — ASSESSMENT & PLAN NOTE
- Discussed possibility that burning sensation could be reflux mediated.    -Will workup as above.  -Recommend trial of Pepcid 20 mg every morning for now.  -Antireflux measures discussed  -Follow-up as needed.

## 2024-03-28 DIAGNOSIS — E03.8 HYPOTHYROIDISM DUE TO HASHIMOTO'S THYROIDITIS: ICD-10-CM

## 2024-03-28 DIAGNOSIS — E06.3 HYPOTHYROIDISM DUE TO HASHIMOTO'S THYROIDITIS: ICD-10-CM

## 2024-03-28 RX ORDER — LEVOTHYROXINE SODIUM 88 UG/1
88 TABLET ORAL
Qty: 90 TABLET | Refills: 1 | Status: SHIPPED | OUTPATIENT
Start: 2024-03-28

## 2024-06-19 ENCOUNTER — OFFICE VISIT (OUTPATIENT)
Dept: FAMILY MEDICINE CLINIC | Facility: CLINIC | Age: 59
End: 2024-06-19
Payer: COMMERCIAL

## 2024-06-19 VITALS
DIASTOLIC BLOOD PRESSURE: 96 MMHG | WEIGHT: 239 LBS | TEMPERATURE: 96.9 F | SYSTOLIC BLOOD PRESSURE: 126 MMHG | HEART RATE: 69 BPM | BODY MASS INDEX: 34.22 KG/M2 | HEIGHT: 70 IN | RESPIRATION RATE: 16 BRPM | OXYGEN SATURATION: 98 %

## 2024-06-19 DIAGNOSIS — E06.3 HYPOTHYROIDISM DUE TO HASHIMOTO'S THYROIDITIS: ICD-10-CM

## 2024-06-19 DIAGNOSIS — I47.10 SVT (SUPRAVENTRICULAR TACHYCARDIA): ICD-10-CM

## 2024-06-19 DIAGNOSIS — E03.8 HYPOTHYROIDISM DUE TO HASHIMOTO'S THYROIDITIS: ICD-10-CM

## 2024-06-19 DIAGNOSIS — E78.5 MILD HYPERLIPIDEMIA: ICD-10-CM

## 2024-06-19 DIAGNOSIS — R73.01 IMPAIRED FASTING GLUCOSE: ICD-10-CM

## 2024-06-19 DIAGNOSIS — Z12.5 SCREENING PSA (PROSTATE SPECIFIC ANTIGEN): ICD-10-CM

## 2024-06-19 DIAGNOSIS — G47.33 OSA (OBSTRUCTIVE SLEEP APNEA): ICD-10-CM

## 2024-06-19 DIAGNOSIS — Z00.00 ANNUAL PHYSICAL EXAM: Primary | ICD-10-CM

## 2024-06-19 DIAGNOSIS — J30.9 ALLERGIC RHINITIS, UNSPECIFIED SEASONALITY, UNSPECIFIED TRIGGER: ICD-10-CM

## 2024-06-19 PROCEDURE — 99396 PREV VISIT EST AGE 40-64: CPT | Performed by: FAMILY MEDICINE

## 2024-06-19 RX ORDER — AZELASTINE 1 MG/ML
1 SPRAY, METERED NASAL 2 TIMES DAILY
Qty: 1 ML | Refills: 1 | Status: SHIPPED | OUTPATIENT
Start: 2024-06-19

## 2024-06-19 NOTE — PROGRESS NOTES
Adult Annual Physical  Name: Alexey Cummings      : 1965      MRN: [id number]  Encounter Provider: Mode Foley DO  Encounter Date: 2024   Encounter department: [facility]    Assessment & Plan   1. Annual physical exam  2. BMI 34.0-34.9,adult  3. ADAIR (obstructive sleep apnea)  Assessment & Plan:  - Getting about 6-8 hours per night.  Feeling well rested, much improved since starting on CPAP.  4. Allergic rhinitis, unspecified seasonality, unspecified trigger  -     azelastine (ASTELIN) 0.1 % nasal spray; 1 spray into each nostril 2 (two) times a day Use in each nostril as directed  5. Mild hyperlipidemia  -     Lipid Panel With Direct LDL; Future  6. Hypothyroidism due to Hashimoto's thyroiditis  Assessment & Plan:  Continues on levothyroxine 88 mcg every morning.  Repeat thyroid testing ordered.  Orders:  -     TSH, 3rd generation with Free T4 reflex; Future  7. Impaired fasting glucose  -     Hemoglobin A1C; Future  -     Comprehensive metabolic panel; Future  8. Screening PSA (prostate specific antigen)  -     PSA, Total Screen; Future  9. SVT (supraventricular tachycardia)  Assessment & Plan:  - Status post ablation on 2023.  Denies any recurrence of palpitations.  Following with cardiology.  Immunizations and preventive care screenings were discussed with patient today. Appropriate education was printed on patient's after visit summary.    Discussed risks and benefits of prostate cancer screening. We discussed the controversial history of PSA screening for prostate cancer in the [place] as well as the risk of over detection and over treatment of prostate cancer by way of PSA screening.  The patient understands that PSA blood testing is an imperfect way to screen for prostate cancer and that elevated PSA levels in the blood may also be caused by infection, inflammation, prostatic trauma or manipulation, urological procedures, or by benign prostatic  "enlargement.    The role of the digital rectal examination in prostate cancer screening was also discussed and I discussed with him that there is large interobserver variability in the findings of digital rectal examination.    Counseling:  Alcohol/drug use: discussed moderation in alcohol intake, the recommendations for healthy alcohol use, and avoidance of illicit drug use.  Dental Health: discussed importance of regular tooth brushing, flossing, and dental visits.  Injury prevention: discussed safety/seat belts, safety helmets, smoke detectors, carbon dioxide detectors, and smoking near bedding or upholstery.  Sexual health: discussed sexually transmitted diseases, partner selection, use of condoms, avoidance of unintended pregnancy, and contraceptive alternatives.  Exercise: the importance of regular exercise/physical activity was discussed. Recommend exercise 3-5 times per week for at least 30 minutes.          History of Present Illness     Adult Annual Physical  Review of Systems   Constitutional:  Negative for chills and fever.   HENT:  Negative for ear pain and sore throat.    Eyes:  Negative for pain and visual disturbance.   Respiratory:  Negative for cough and shortness of breath.    Cardiovascular:  Negative for chest pain and palpitations.   Gastrointestinal:  Negative for abdominal pain and vomiting.   Genitourinary:  Negative for dysuria and hematuria.   Musculoskeletal:  Negative for arthralgias and back pain.   Skin:  Negative for color change and rash.   Neurological:  Negative for seizures and syncope.   Psychiatric/Behavioral:  Negative for confusion. The patient is not nervous/anxious.    All other systems reviewed and are negative.        Objective     /96 (BP Location: Left arm, Patient Position: Sitting, Cuff Size: Standard)   Pulse 69   Temp (!) 96.9 °F (36.1 °C) (Tympanic)   Resp 16   Ht 5' 10.2\" (1.783 m)   Wt 108 kg (239 lb)   SpO2 98%   BMI 34.10 kg/m²     Physical " Exam  Vitals and nursing note reviewed.   Constitutional:       General: He is not in acute distress.     Appearance: Normal appearance.   HENT:      Head: Normocephalic and atraumatic.      Right Ear: Tympanic membrane and external ear normal.      Left Ear: Tympanic membrane and external ear normal.      Nose: Nose normal.      Mouth/Throat:      Mouth: Mucous membranes are moist.   Eyes:      Extraocular Movements: Extraocular movements intact.      Conjunctiva/sclera: Conjunctivae normal.      Pupils: Pupils are equal, round, and reactive to light.   Cardiovascular:      Rate and Rhythm: Normal rate and regular rhythm.      Pulses: Normal pulses.      Heart sounds: Normal heart sounds. No murmur heard.  Pulmonary:      Effort: Pulmonary effort is normal.      Breath sounds: Normal breath sounds. No wheezing, rhonchi or rales.   Abdominal:      General: Bowel sounds are normal.      Palpations: Abdomen is soft.      Tenderness: There is no abdominal tenderness. There is no guarding.   Musculoskeletal:         General: Normal range of motion.      Cervical back: Normal range of motion.      Right lower leg: No edema.      Left lower leg: No edema.   Lymphadenopathy:      Cervical: No cervical adenopathy.   Skin:     General: Skin is warm.      Capillary Refill: Capillary refill takes less than 2 seconds.   Neurological:      General: No focal deficit present.      Mental Status: He is alert and oriented to person, place, and time.   Psychiatric:         Mood and Affect: Mood normal.         Behavior: Behavior normal.       Administrative Statements

## 2024-07-22 ENCOUNTER — TELEPHONE (OUTPATIENT)
Age: 59
End: 2024-07-22

## 2024-07-23 DIAGNOSIS — J30.2 SEASONAL ALLERGIES: ICD-10-CM

## 2024-07-23 DIAGNOSIS — J30.9 ALLERGIC RHINITIS, UNSPECIFIED SEASONALITY, UNSPECIFIED TRIGGER: Primary | ICD-10-CM

## 2024-07-23 NOTE — TELEPHONE ENCOUNTER
Thank you for the message.  I have sent over a referral for allergist for patient to follow-up with.  Thank you.

## 2024-07-31 ENCOUNTER — APPOINTMENT (OUTPATIENT)
Dept: LAB | Facility: CLINIC | Age: 59
End: 2024-07-31
Payer: COMMERCIAL

## 2024-07-31 DIAGNOSIS — E06.3 HYPOTHYROIDISM DUE TO HASHIMOTO'S THYROIDITIS: ICD-10-CM

## 2024-07-31 DIAGNOSIS — R73.01 IMPAIRED FASTING GLUCOSE: ICD-10-CM

## 2024-07-31 DIAGNOSIS — E03.8 HYPOTHYROIDISM DUE TO HASHIMOTO'S THYROIDITIS: ICD-10-CM

## 2024-07-31 DIAGNOSIS — E78.5 MILD HYPERLIPIDEMIA: ICD-10-CM

## 2024-07-31 DIAGNOSIS — Z12.5 SCREENING PSA (PROSTATE SPECIFIC ANTIGEN): ICD-10-CM

## 2024-07-31 LAB
ALBUMIN SERPL BCG-MCNC: 4.4 G/DL (ref 3.5–5)
ALP SERPL-CCNC: 54 U/L (ref 34–104)
ALT SERPL W P-5'-P-CCNC: 15 U/L (ref 7–52)
ANION GAP SERPL CALCULATED.3IONS-SCNC: 13 MMOL/L (ref 4–13)
AST SERPL W P-5'-P-CCNC: 19 U/L (ref 13–39)
BILIRUB SERPL-MCNC: 0.62 MG/DL (ref 0.2–1)
BUN SERPL-MCNC: 20 MG/DL (ref 5–25)
CALCIUM SERPL-MCNC: 9.3 MG/DL (ref 8.4–10.2)
CHLORIDE SERPL-SCNC: 102 MMOL/L (ref 96–108)
CHOLEST SERPL-MCNC: 167 MG/DL
CO2 SERPL-SCNC: 24 MMOL/L (ref 21–32)
CREAT SERPL-MCNC: 0.87 MG/DL (ref 0.6–1.3)
EST. AVERAGE GLUCOSE BLD GHB EST-MCNC: 131 MG/DL
GFR SERPL CREATININE-BSD FRML MDRD: 93 ML/MIN/1.73SQ M
GLUCOSE P FAST SERPL-MCNC: 101 MG/DL (ref 65–99)
HBA1C MFR BLD: 6.2 %
HDLC SERPL-MCNC: 56 MG/DL
LDLC SERPL CALC-MCNC: 97 MG/DL (ref 0–100)
LDLC SERPL DIRECT ASSAY-MCNC: 114 MG/DL (ref 0–100)
POTASSIUM SERPL-SCNC: 4.6 MMOL/L (ref 3.5–5.3)
PROT SERPL-MCNC: 6.8 G/DL (ref 6.4–8.4)
PSA SERPL-MCNC: 2.75 NG/ML (ref 0–4)
SODIUM SERPL-SCNC: 139 MMOL/L (ref 135–147)
TRIGL SERPL-MCNC: 69 MG/DL
TSH SERPL DL<=0.05 MIU/L-ACNC: 3.34 UIU/ML (ref 0.45–4.5)

## 2024-07-31 PROCEDURE — 80053 COMPREHEN METABOLIC PANEL: CPT

## 2024-07-31 PROCEDURE — 84443 ASSAY THYROID STIM HORMONE: CPT

## 2024-07-31 PROCEDURE — 83721 ASSAY OF BLOOD LIPOPROTEIN: CPT

## 2024-07-31 PROCEDURE — 36415 COLL VENOUS BLD VENIPUNCTURE: CPT

## 2024-07-31 PROCEDURE — G0103 PSA SCREENING: HCPCS

## 2024-07-31 PROCEDURE — 83036 HEMOGLOBIN GLYCOSYLATED A1C: CPT

## 2024-07-31 PROCEDURE — 80061 LIPID PANEL: CPT

## 2024-08-27 DIAGNOSIS — Z00.00 ENCOUNTER FOR PREVENTIVE HEALTH EXAMINATION: Primary | ICD-10-CM

## 2024-09-12 ENCOUNTER — OFFICE VISIT (OUTPATIENT)
Dept: FAMILY MEDICINE CLINIC | Facility: CLINIC | Age: 59
End: 2024-09-12

## 2024-09-12 ENCOUNTER — HOSPITAL ENCOUNTER (OUTPATIENT)
Dept: NON INVASIVE DIAGNOSTICS | Facility: CLINIC | Age: 59
Discharge: HOME | End: 2024-09-12

## 2024-09-12 ENCOUNTER — HOSPITAL ENCOUNTER (OUTPATIENT)
Dept: ULTRASOUND IMAGING | Facility: HOSPITAL | Age: 59
Discharge: HOME | End: 2024-09-12

## 2024-09-12 ENCOUNTER — HOSPITAL ENCOUNTER (OUTPATIENT)
Dept: VASCULAR ULTRASOUND | Facility: HOSPITAL | Age: 59
Discharge: HOME | End: 2024-09-12

## 2024-09-12 ENCOUNTER — LAB (OUTPATIENT)
Dept: LAB | Facility: CLINIC | Age: 59
End: 2024-09-12

## 2024-09-12 VITALS
BODY MASS INDEX: 33.32 KG/M2 | WEIGHT: 238 LBS | HEART RATE: 74 BPM | SYSTOLIC BLOOD PRESSURE: 122 MMHG | DIASTOLIC BLOOD PRESSURE: 82 MMHG | HEIGHT: 71 IN

## 2024-09-12 VITALS
DIASTOLIC BLOOD PRESSURE: 82 MMHG | SYSTOLIC BLOOD PRESSURE: 122 MMHG | HEIGHT: 71 IN | TEMPERATURE: 97.8 F | OXYGEN SATURATION: 98 % | HEART RATE: 74 BPM | RESPIRATION RATE: 16 BRPM | WEIGHT: 238 LBS | BODY MASS INDEX: 33.32 KG/M2

## 2024-09-12 DIAGNOSIS — E06.3 HYPOTHYROIDISM DUE TO HASHIMOTO'S THYROIDITIS: ICD-10-CM

## 2024-09-12 DIAGNOSIS — Z00.00 ENCOUNTER FOR PREVENTIVE HEALTH EXAMINATION: ICD-10-CM

## 2024-09-12 DIAGNOSIS — J30.9 ALLERGIC RHINITIS, UNSPECIFIED SEASONALITY, UNSPECIFIED TRIGGER: ICD-10-CM

## 2024-09-12 DIAGNOSIS — J30.1 SEASONAL ALLERGIC RHINITIS DUE TO POLLEN: ICD-10-CM

## 2024-09-12 DIAGNOSIS — Z00.00 HEALTH CARE MAINTENANCE: Primary | ICD-10-CM

## 2024-09-12 DIAGNOSIS — E03.8 HYPOTHYROIDISM DUE TO HASHIMOTO'S THYROIDITIS: ICD-10-CM

## 2024-09-12 DIAGNOSIS — R73.01 IMPAIRED FASTING GLUCOSE: ICD-10-CM

## 2024-09-12 PROBLEM — R00.2 PALPITATIONS: Status: RESOLVED | Noted: 2022-07-30 | Resolved: 2024-09-12

## 2024-09-12 PROBLEM — R10.11 RUQ PAIN: Status: RESOLVED | Noted: 2024-01-15 | Resolved: 2024-09-12

## 2024-09-12 PROBLEM — M25.569 KNEE PAIN: Status: RESOLVED | Noted: 2021-06-24 | Resolved: 2024-09-12

## 2024-09-12 PROBLEM — R79.89 ELEVATED TROPONIN: Status: RESOLVED | Noted: 2022-07-30 | Resolved: 2024-09-12

## 2024-09-12 PROBLEM — I47.10 SVT (SUPRAVENTRICULAR TACHYCARDIA): Status: RESOLVED | Noted: 2022-08-01 | Resolved: 2024-09-12

## 2024-09-12 LAB
25(OH)D3 SERPL-MCNC: 38.3 NG/ML (ref 30–100)
ALBUMIN SERPL BCG-MCNC: 4.7 G/DL (ref 3.5–5)
ALP SERPL-CCNC: 55 U/L (ref 34–104)
ALT SERPL W P-5'-P-CCNC: 18 U/L (ref 7–52)
ANION GAP SERPL CALCULATED.3IONS-SCNC: 5 MMOL/L (ref 4–13)
AORTIC ROOT: 3.2 CM
AORTIC VALVE MEAN VELOCITY: 7.4 M/S
APICAL FOUR CHAMBER EJECTION FRACTION: 58 %
ASCENDING AORTA: 3.3 CM
AST SERPL W P-5'-P-CCNC: 20 U/L (ref 13–39)
ATRIAL RATE: 59 BPM
AV LVOT MEAN GRADIENT: 2 MMHG
AV LVOT PEAK GRADIENT: 3 MMHG
AV MEAN GRADIENT: 3 MMHG
AV PEAK GRADIENT: 4 MMHG
AV REGURGITATION PRESSURE HALF TIME: 724 MS
AV VELOCITY RATIO: 0.87
BACTERIA UR QL AUTO: NORMAL /HPF
BASOPHILS # BLD AUTO: 0.03 THOUSANDS/ÂΜL (ref 0–0.1)
BASOPHILS NFR BLD AUTO: 1 % (ref 0–1)
BILIRUB SERPL-MCNC: 1.01 MG/DL (ref 0.2–1)
BILIRUB UR QL STRIP: NEGATIVE
BSA FOR ECHO PROCEDURE: 2.27 M2
BUN SERPL-MCNC: 19 MG/DL (ref 5–25)
CALCIUM SERPL-MCNC: 9.8 MG/DL (ref 8.4–10.2)
CHLORIDE SERPL-SCNC: 101 MMOL/L (ref 96–108)
CHOLEST SERPL-MCNC: 178 MG/DL
CLARITY UR: CLEAR
CO2 SERPL-SCNC: 30 MMOL/L (ref 21–32)
COLOR UR: NORMAL
CREAT SERPL-MCNC: 0.99 MG/DL (ref 0.6–1.3)
CRP SERPL HS-MCNC: 1.55 MG/L
DOP CALC AO PEAK VEL: 1.06 M/S
DOP CALC AO VTI: 20.54 CM
DOP CALC LVOT PEAK VEL VTI: 17.6 CM
DOP CALC LVOT PEAK VEL: 0.92 M/S
E WAVE DECELERATION TIME: 194 MS
E/A RATIO: 0.87
EOSINOPHIL # BLD AUTO: 0.2 THOUSAND/ÂΜL (ref 0–0.61)
EOSINOPHIL NFR BLD AUTO: 3 % (ref 0–6)
ERYTHROCYTE [DISTWIDTH] IN BLOOD BY AUTOMATED COUNT: 12.8 % (ref 11.6–15.1)
EST. AVERAGE GLUCOSE BLD GHB EST-MCNC: 126 MG/DL
FRACTIONAL SHORTENING: 32 (ref 28–44)
GFR SERPL CREATININE-BSD FRML MDRD: 81 ML/MIN/1.73SQ M
GLUCOSE P FAST SERPL-MCNC: 122 MG/DL (ref 65–99)
GLUCOSE UR STRIP-MCNC: NEGATIVE MG/DL
HBA1C MFR BLD: 6 %
HCT VFR BLD AUTO: 47.6 % (ref 36.5–49.3)
HCV AB SER QL: NORMAL
HDLC SERPL-MCNC: 49 MG/DL
HGB BLD-MCNC: 16.4 G/DL (ref 12–17)
HGB UR QL STRIP.AUTO: NEGATIVE
IMM GRANULOCYTES # BLD AUTO: 0.01 THOUSAND/UL (ref 0–0.2)
IMM GRANULOCYTES NFR BLD AUTO: 0 % (ref 0–2)
INTERVENTRICULAR SEPTUM IN DIASTOLE (PARASTERNAL SHORT AXIS VIEW): 1.2 CM
INTERVENTRICULAR SEPTUM: 1.2 CM (ref 0.6–1.1)
KETONES UR STRIP-MCNC: NEGATIVE MG/DL
LAAS-AP2: 19 CM2
LAAS-AP4: 16.6 CM2
LDLC SERPL CALC-MCNC: 114 MG/DL (ref 0–100)
LEFT ATRIUM SIZE: 4.3 CM
LEFT ATRIUM VOLUME (MOD BIPLANE): 50 ML
LEFT ATRIUM VOLUME INDEX (MOD BIPLANE): 22.1 ML/M2
LEFT INTERNAL DIMENSION IN SYSTOLE: 3.2 CM (ref 2.1–4)
LEFT VENTRICULAR INTERNAL DIMENSION IN DIASTOLE: 4.7 CM (ref 3.5–6)
LEFT VENTRICULAR POSTERIOR WALL IN END DIASTOLE: 1.2 CM
LEFT VENTRICULAR STROKE VOLUME: 63 ML
LEUKOCYTE ESTERASE UR QL STRIP: NEGATIVE
LVSV (TEICH): 63 ML
LYMPHOCYTES # BLD AUTO: 2.17 THOUSANDS/ÂΜL (ref 0.6–4.47)
LYMPHOCYTES NFR BLD AUTO: 34 % (ref 14–44)
MAX HR PERCENT: 97 %
MAX HR: 146 BPM
MCH RBC QN AUTO: 29.8 PG (ref 26.8–34.3)
MCHC RBC AUTO-ENTMCNC: 34.5 G/DL (ref 31.4–37.4)
MCV RBC AUTO: 86 FL (ref 82–98)
MONOCYTES # BLD AUTO: 0.5 THOUSAND/ÂΜL (ref 0.17–1.22)
MONOCYTES NFR BLD AUTO: 8 % (ref 4–12)
MV E'TISSUE VEL-LAT: 9 CM/S
MV E'TISSUE VEL-SEP: 7 CM/S
MV PEAK A VEL: 0.61 M/S
MV PEAK E VEL: 53 CM/S
MV STENOSIS PRESSURE HALF TIME: 56 MS
MV VALVE AREA P 1/2 METHOD: 3.9
NEUTROPHILS # BLD AUTO: 3.5 THOUSANDS/ÂΜL (ref 1.85–7.62)
NEUTS SEG NFR BLD AUTO: 54 % (ref 43–75)
NITRITE UR QL STRIP: NEGATIVE
NON-SQ EPI CELLS URNS QL MICRO: NORMAL /HPF
NRBC BLD AUTO-RTO: 0 /100 WBCS
P AXIS: 10 DEGREES
PH UR STRIP.AUTO: 5.5 [PH]
PLATELET # BLD AUTO: 268 THOUSANDS/UL (ref 149–390)
PMV BLD AUTO: 10 FL (ref 8.9–12.7)
POTASSIUM SERPL-SCNC: 4.3 MMOL/L (ref 3.5–5.3)
PR INTERVAL: 192 MS
PROT SERPL-MCNC: 7.6 G/DL (ref 6.4–8.4)
PROT UR STRIP-MCNC: NEGATIVE MG/DL
PSA SERPL-MCNC: 3.37 NG/ML (ref 0–4)
QRS AXIS: 9 DEGREES
QRSD INTERVAL: 78 MS
QT INTERVAL: 380 MS
QTC INTERVAL: 376 MS
RATE PRESSURE PRODUCT: NORMAL
RBC # BLD AUTO: 5.51 MILLION/UL (ref 3.88–5.62)
RBC #/AREA URNS AUTO: NORMAL /HPF
RIGHT ATRIUM AREA SYSTOLE A4C: 15.9 CM2
RIGHT VENTRICLE ID DIMENSION: 4.2 CM
SL CV AV DECELERATION TIME RETROGRADE: 2497 MS
SL CV AV PEAK GRADIENT RETROGRADE: 85 MMHG
SL CV LEFT ATRIUM LENGTH A2C: 5.9 CM
SL CV LV EF: 65
SL CV LV EF: 65
SL CV PED ECHO LEFT VENTRICLE DIASTOLIC VOLUME (MOD BIPLANE) 2D: 105 ML
SL CV PED ECHO LEFT VENTRICLE SYSTOLIC VOLUME (MOD BIPLANE) 2D: 41 ML
SL CV STRESS RECOVERY BP: NORMAL MMHG
SL CV STRESS RECOVERY HR: 91 BPM
SL CV STRESS RECOVERY O2 SAT: 97 %
SL CV STRESS STAGE REACHED: 4
SODIUM SERPL-SCNC: 136 MMOL/L (ref 135–147)
SP GR UR STRIP.AUTO: 1.02 (ref 1–1.03)
STRESS ANGINA INDEX: 0
STRESS BASELINE BP: NORMAL MMHG
STRESS BASELINE HR: 67 BPM
STRESS O2 SAT REST: 95 %
STRESS PEAK HR: 146 BPM
STRESS POST ESTIMATED WORKLOAD: 13.7 METS
STRESS POST EXERCISE DUR MIN: 12 MIN
STRESS POST EXERCISE DUR SEC: 0 SEC
STRESS POST O2 SAT PEAK: 93 %
STRESS POST PEAK BP: 180 MMHG
T WAVE AXIS: -16 DEGREES
T4 FREE SERPL-MCNC: 1.14 NG/DL (ref 0.61–1.12)
TRICUSPID ANNULAR PLANE SYSTOLIC EXCURSION: 2 CM
TRIGL SERPL-MCNC: 74 MG/DL
TSH SERPL DL<=0.05 MIU/L-ACNC: 5.14 UIU/ML (ref 0.45–4.5)
UROBILINOGEN UR STRIP-ACNC: <2 MG/DL
VENTRICULAR RATE: 59 BPM
WBC # BLD AUTO: 6.41 THOUSAND/UL (ref 4.31–10.16)
WBC #/AREA URNS AUTO: NORMAL /HPF

## 2024-09-12 PROCEDURE — 93350 STRESS TTE ONLY: CPT

## 2024-09-12 PROCEDURE — 93350 STRESS TTE ONLY: CPT | Performed by: INTERNAL MEDICINE

## 2024-09-12 PROCEDURE — 99499EX: Performed by: FAMILY MEDICINE

## 2024-09-12 PROCEDURE — 82306 VITAMIN D 25 HYDROXY: CPT

## 2024-09-12 PROCEDURE — VASC: Performed by: SURGERY

## 2024-09-12 PROCEDURE — 81001 URINALYSIS AUTO W/SCOPE: CPT

## 2024-09-12 PROCEDURE — 84439 ASSAY OF FREE THYROXINE: CPT

## 2024-09-12 PROCEDURE — 36415 COLL VENOUS BLD VENIPUNCTURE: CPT

## 2024-09-12 PROCEDURE — 80061 LIPID PANEL: CPT

## 2024-09-12 PROCEDURE — 86803 HEPATITIS C AB TEST: CPT

## 2024-09-12 PROCEDURE — 83036 HEMOGLOBIN GLYCOSYLATED A1C: CPT

## 2024-09-12 PROCEDURE — 93306 TTE W/DOPPLER COMPLETE: CPT

## 2024-09-12 PROCEDURE — 86141 C-REACTIVE PROTEIN HS: CPT

## 2024-09-12 PROCEDURE — 93922 UPR/L XTREMITY ART 2 LEVELS: CPT

## 2024-09-12 PROCEDURE — 84443 ASSAY THYROID STIM HORMONE: CPT

## 2024-09-12 PROCEDURE — 80053 COMPREHEN METABOLIC PANEL: CPT

## 2024-09-12 PROCEDURE — 76700 US EXAM ABDOM COMPLETE: CPT

## 2024-09-12 PROCEDURE — 84153 ASSAY OF PSA TOTAL: CPT

## 2024-09-12 PROCEDURE — 85025 COMPLETE CBC W/AUTO DIFF WBC: CPT

## 2024-09-12 PROCEDURE — 93010 ELECTROCARDIOGRAM REPORT: CPT | Performed by: INTERNAL MEDICINE

## 2024-09-12 PROCEDURE — 93306 TTE W/DOPPLER COMPLETE: CPT | Performed by: INTERNAL MEDICINE

## 2024-09-12 PROCEDURE — 93005 ELECTROCARDIOGRAM TRACING: CPT

## 2024-09-12 RX ORDER — AZELASTINE 1 MG/ML
1 SPRAY, METERED NASAL 2 TIMES DAILY
Qty: 30 ML | Refills: 1 | Status: SHIPPED | OUTPATIENT
Start: 2024-09-12

## 2024-09-12 NOTE — PROGRESS NOTES
Fitness Summary and Recommendations: Alexey scored at the 5% on his body composition assessment with a bodyfat % of 35.2%. Alexey scored in the average range for flexibility with a sit & reach score of 23 cm. His Muscle Strength/Endurance scores placed him at the 85% (lower body) and 95% (upper body) with a chair stand score of 27 and arm curl score of 37 respectively. Alexey's Cardiovascular Score of 52.7 placed him at the 95%. Overall, Alexey would be considered to have an above average fitness level with a 67% score. His overall fitness score has increased by 7% from his previous test on 06/24/21. Continued emphasis on regular exercise and sound nutrition practices will enable Alexey to reach his optimal level of fitness.

## 2024-09-12 NOTE — PROGRESS NOTES
"  Your [facility] Board-Certified Dermatologist's Name: Melissa Martinez MD    Skin Screening Exam:    A chaperone was present throughout the entire encounter.      SKIN:  FULL ORGAN SYSTEM EXAM   Hair, Scalp, Ears, Face Normal except as noted below in Assessment   Neck, Cervical Chain Nodes Normal except as noted below in Assessment   Right Arm/Hand/Fingers Normal except as noted below in Assessment   Left Arm/Hand/Fingers Normal except as noted below in Assessment   Chest/Breasts/Axillae Did the patient specifically refuse to have the areas \"under-the-bra\" examined by the Dermatologist? No  Examined areas normal except as noted below in Assessment   Abdomen, Umbilicus Normal except as noted below in Assessment   Back/Spine Normal except as noted below in Assessment   Groin/Genitalia/Buttocks Did the patient specifically refuse to have the areas \"under-the-underwear\" examined by the Dermatologist? YES  Examined areas normal except as noted below in Assessment   Right Leg, Foot, Toes Normal except as noted below in Assessment   Left Leg, Foot, Toes Normal except as noted below in Assessment        Assessment and Plan by Diagnosis:    Use a moisturizer + sunscreen \"combo\" product such as Neutrogena Daily Defense SPF 50+ or CeraVe AM at least three times a day.  Follow-up with your private dermatologist or one of our board-certified [facility] Dermatologists as discussed.  [facility]'s own Dr. Marielos Ontiveros is available for consultation for skin enhancement and revitalization services; please mention \"[facility]\" for expedited scheduling      "

## 2024-09-12 NOTE — PROGRESS NOTES
Nutritional Summary and Recommendations:    Patient Nutrition-Oriented Medical/Diet Hx  Alexey presents today to [facility] for nutrition assessment and counseling. Alexey reports trying to follow a healthy diet by reducing sugar intake and fasting for 16 hrs 1-2X week. Discussion focused on strategies for weight management, adequate fiber intake, and reducing A1C level. Labs reviewed.            Labs:  A1C 6.0  Total Cholesterol 178  Triglycerides 74  HDL 49    Vitamin D 38.3     Anthropometrics:     Ht: 5 ft 10 in Wt:  233.2 lb  BMI: 33.5      BMR:  2003 kcals Fat%:  35.2 % Acceptable Range: 13-25%     Fat Mass: 82 lb  FFM:  151.2 lb TBW: 110.6 lb      Nutrition Diagnosis:  Altered nutrition related laboratory values  r/t physiological events as evidenced by A1C 6.0%     Nutrition Recommendations:    Reduce A1C level to <6.0 within 3-6 months through dietary changes as discussed with RD.  Aim for adequate fiber intake, ~30 grams of fiber per day. Consider psyllium husk on fasting days.  Improve body composition by next visit, aim for gradual weight loss of 1-2 lb/week.   Contact RD with any questions or concerns.    Nutrition Education     Pre- Diabetes Nutrition Education      Perceived Comprehension:   Good    Expected Compliance:  Good

## 2024-09-12 NOTE — PROGRESS NOTES
[facility] Physical Exam     Alexey Cummings is a 59 y.o. male who is presenting for an [facility] Physical Exam at [facility]. Danny presents for his second [facility] physical today.  He states he is doing well with the exception of congestion he typically gets from May-September/October.  He is currently on Flonase and does not feel this is helping much. He states his symptoms improve when he is in [place] for 6 months out of the year, but do not completely resolve.  Since his last [facility] physical he did undergo a successful AVNRT ablation.    Review of Systems   Constitutional:  Negative for chills, fatigue and fever.   HENT:  Negative for congestion, ear pain, hearing loss, postnasal drip, rhinorrhea and sore throat.    Eyes:  Negative for pain and visual disturbance.   Respiratory:  Negative for chest tightness, shortness of breath and wheezing.    Cardiovascular:  Negative for chest pain and leg swelling.   Gastrointestinal:  Negative for abdominal distention, abdominal pain, constipation, diarrhea and vomiting.   Endocrine: Negative for cold intolerance and heat intolerance.   Genitourinary:  Negative for difficulty urinating, frequency and urgency.   Musculoskeletal:  Negative for arthralgias and gait problem.   Skin:  Negative for color change.   Allergic/Immunologic: Positive for environmental allergies.   Neurological:  Negative for dizziness, tremors, syncope, numbness and headaches.   Hematological:  Negative for adenopathy.   Psychiatric/Behavioral:  Negative for agitation, confusion and sleep disturbance. The patient is not nervous/anxious.          Active Ambulatory Problems     Diagnosis Date Noted    Impaired fasting glucose 07/03/2017    Vitamin D deficiency 03/24/2017    Hypersomnia 08/08/2023    ADAIR (obstructive sleep apnea)     s/p AVNRT ablation with slow pathway modification 12/18/2023 12/18/2023    Hypothyroidism due to Hashimoto's thyroiditis  12/19/2023    Gastroesophageal reflux disease without esophagitis 01/17/2024    Seasonal allergic rhinitis due to pollen 09/12/2024     Resolved Ambulatory Problems     Diagnosis Date Noted    Wellness examination 03/30/2018    Knee pain 06/24/2021    Palpitations 07/30/2022    Elevated troponin 07/30/2022    SVT (supraventricular tachycardia) 08/01/2022    Obstructive sleep apnea (adult) (pediatric) 10/09/2023    RUQ pain 01/15/2024     Past Medical History:   Diagnosis Date    Allergic 2000    Arthritis     Hypothyroidism     Infectious diarrhea     Overweight     Panic attacks     Paresthesias/numbness     Prediabetes     Prehypertension     Tinea versicolor     Varicose vein of leg     Wears glasses        Past Surgical History:   Procedure Laterality Date    APPENDECTOMY      LAST ASSESSED: [id number]    CARDIAC ELECTROPHYSIOLOGY PROCEDURE N/A 12/18/2023    Procedure: Cardiac eps/svt ablation;  Surgeon: Mahendra Navarrete MD;  Location:  CARDIAC CATH LAB;  Service: Cardiology    CHEILECTOMY Right 12/1/2017    Procedure: CHEILECTOMY;  Surgeon: Kenneth Baugh DPM;  Location: [facility];  Service: Podiatry    HIP PINNING Left     as a child    HIP SURGERY      LAST ASSESSED: [id number]       Family History   Problem Relation Age of Onset    Diabetes Mother     Hypertension Mother     Dementia Mother     Arthritis Mother     Prostate cancer Father     Heart attack Father     Stroke Father         Late 80’s    Cancer Father         Prostate    Diabetes Paternal Aunt        Social History     Tobacco Use   Smoking Status Never    Passive exposure: Never   Smokeless Tobacco Never         Current Outpatient Medications:     azelastine (ASTELIN) 0.1 % nasal spray, 1 spray into each nostril 2 (two) times a day Use in each nostril as directed, Disp: 30 mL, Rfl: 1    levothyroxine 88 mcg tablet, TAKE 1 TABLET (88 MCG TOTAL) BY MOUTH DAILY IN THE EARLY MORNING, Disp: 90 tablet, Rfl: 1    multivitamin (THERAGRAN) TABS, Take 1  "tablet by mouth daily in the early morning, Disp: , Rfl:     No Known Allergies      Objective:    Vitals:    09/12/24 1045   BP: 122/82   BP Location: Right arm   Patient Position: Sitting   Pulse: 74   Resp: 16   Temp: 97.8 °F (36.6 °C)   SpO2: 98%   Weight: 108 kg (238 lb)   Height: 5' 11\" (1.803 m)        Physical Exam  Vitals and nursing note reviewed.   Constitutional:       General: He is not in acute distress.     Appearance: Normal appearance. He is well-developed.   HENT:      Head: Normocephalic.      Right Ear: Tympanic membrane normal.      Left Ear: Tympanic membrane normal.      Nose: Nose normal.      Comments: Pale turbinates     Mouth/Throat:      Mouth: Mucous membranes are moist.   Eyes:      General: No scleral icterus.     Extraocular Movements: Extraocular movements intact.      Conjunctiva/sclera: Conjunctivae normal.      Pupils: Pupils are equal, round, and reactive to light.   Neck:      Thyroid: No thyromegaly.   Cardiovascular:      Rate and Rhythm: Normal rate and regular rhythm.      Heart sounds: Normal heart sounds. No murmur heard.  Pulmonary:      Effort: Pulmonary effort is normal. No respiratory distress.      Breath sounds: Normal breath sounds. No wheezing.   Abdominal:      General: Bowel sounds are normal. There is no distension.      Palpations: Abdomen is soft.      Tenderness: There is no abdominal tenderness.   Genitourinary:     Prostate: Normal.      Rectum: Normal. Guaiac result negative.   Musculoskeletal:         General: No tenderness. Normal range of motion.      Cervical back: Normal range of motion.      Right lower leg: No edema.      Left lower leg: No edema.   Lymphadenopathy:      Cervical: No cervical adenopathy.   Skin:     General: Skin is warm and dry.      Coloration: Skin is not pale.      Findings: No rash.   Neurological:      Mental Status: He is alert and oriented to person, place, and time.      Cranial Nerves: No cranial nerve deficit.      Deep " Tendon Reflexes: Reflexes normal.   Psychiatric:         Mood and Affect: Mood normal.         Behavior: Behavior normal.             Assessment/Plan:     1. Health care maintenance  2. Impaired fasting glucose  Assessment & Plan:  A1c has improved, continue diet and exercise and I recommend monitoring this every 6 months.  3. Hypothyroidism due to Hashimoto's thyroiditis  Assessment & Plan:  TSH is slightly elevates as well as T4, repeat both of these levels in 6 months.  4. Seasonal allergic rhinitis due to pollen  Assessment & Plan:  Continue the azelastine 1 spray each nostril twice a day and re-start the flonase 2 squirt each nostril daily at bedtime.  5. Allergic rhinitis, unspecified seasonality, unspecified trigger  -     azelastine (ASTELIN) 0.1 % nasal spray; 1 spray into each nostril 2 (two) times a day Use in each nostril as directed       Executive Physical Summary: Danny, thank you for trusting us with your care.  We hope you enjoyed your experience today and gained insight about your health.  If you have any questions about today's findings please contact me at [email or web address].  You did great today and you are in very good health.  We hope that the information you received today will help you to make any necessary changes to improve your overall health.    There are many facets to staying healthy and I want to briefly review these.  Prevention of disease is essential.  Eating right, exercising and avoiding unhealthy habits are the hallmark for prevention.  You will see the recommendations from our nutritionist and exercise physiologist in this binder to help you make the right choices.  Vaccinations are an important part of prevention as well.  We recommend you receive the following vaccines: yearly flu shot, COVID booster and shingles vaccination.  Unfortunately there are some conditions that cannot be prevented and it is imperative that we screen for these as appropriate.  We recommend you  keep up with the following screenings yearly PSA and colonoscopy in 2025.

## 2024-09-12 NOTE — ASSESSMENT & PLAN NOTE
Continue the azelastine 1 spray each nostril twice a day and re-start the flonase 2 squirt each nostril daily at bedtime.

## 2024-09-12 NOTE — PROGRESS NOTES
HEART AND VASCULAR SUMMARY    1. Lipids: Total 178, TG 74, HDL 49,     2. ECG: Sinus bradycardia, normal ecg    3. Echocardiogram: Normal    4. Stress ECHO: Normal    5. Coronary Calcium CT: 14 in 2021, low to intermediate score    6. The 10-year ASCVD risk score (Emi PARHAM, et al., 2019) is: 7.9%    Values used to calculate the score:      Age: 59 years      Sex: Male      Is Non- : No      Diabetic: No      Tobacco smoker: No      Systolic Blood Pressure: 122 mmHg      Is BP treated: No      HDL Cholesterol: 49 mg/dL      Total Cholesterol: 178 mg/dL     7. HSCRP:   Lab Results   Component Value Date    HSCRP 1.55 09/12/2024       8. Vitals:   Vitals:    09/12/24 1045   BP: 122/82   Pulse: 74   Resp: 16   Temp: 97.8 °F (36.6 °C)   SpO2: 98%       Impression and Recommendations:    BP is normal  Low but positive calcium scan in 2021  Mild hypercholesterolemia (slightly worse despite weight loss)  Has lost 18 pounds since 12/23 trying to improve lifestyle.  Hopefully dietician recommendations for cholesterol reduction will help and can be reassessed by his PCP or cardiologist.

## 2024-09-12 NOTE — PROGRESS NOTES
Hearing Assessment Summary:   Hearing Screening    250Hz 500Hz 1000Hz 2000Hz 4000Hz 8000Hz   Right ear 25 25 25 20 35 45   Left ear 25 20 20 30 45 45   Comments: HEARING EVALUATION    Name:  Alexey Cummings  :  1965  Age:  59 y.o.   MRN:  [id number]  Date of Evaluation: 24     History: [facility] Exam  Reason for visit: Alexey Cummings is being seen today for an evaluation of hearing as part of an [facility] examination.  patient reports no changes to his hearing since his last visit.  He has a know mild hearing loss, bilaterally.      EVALUATION:    Otoscopic Evaluation:   Right Ear: Clear and healthy ear canal and tympanic membrane   Left Ear: Clear and healthy ear canal and tympanic membrane    Tympanometry:   Right: Type A - normal middle ear pressure and compliance   Left: Type A - normal middle ear pressure and compliance    Audiogram Results:  Pure tone testing revealed a Mild sensorineural hearing loss (SNHL) hearing loss bilaterally. SRT and PTA are in agreement indicating good test reliability. Word recognition scores were excellent bilaterally.       *see attached audiogram      RECOMMENDATIONS:  Annual hearing eval and Return to [facility]. for F/U    PATIENT EDUCATION:   Discussed results and recommendations with patient.  Questions were addressed and the patient was encouraged to contact our department should concerns arise.      Fredi De La Paz., [facility]  Clinical Audiologist'\      Vision Screening    Right eye Left eye Both eyes   Without correction      With correction 20/13 20/20 20/13

## 2024-09-13 LAB
CHEST PAIN STATEMENT: NORMAL
MAX DIASTOLIC BP: 70 MMHG
MAX PREDICTED HEART RATE: 159 BPM
PROTOCOL NAME: NORMAL
REASON FOR TERMINATION: NORMAL
STRESS POST EXERCISE DUR MIN: 12 MIN
STRESS POST EXERCISE DUR SEC: 0 SEC
STRESS POST PEAK HR: 155 BPM
STRESS POST PEAK SYSTOLIC BP: 180 MMHG
TARGET HR FORMULA: NORMAL
TEST INDICATION: NORMAL

## 2024-09-25 DIAGNOSIS — E03.8 HYPOTHYROIDISM DUE TO HASHIMOTO'S THYROIDITIS: ICD-10-CM

## 2024-09-25 DIAGNOSIS — E06.3 HYPOTHYROIDISM DUE TO HASHIMOTO'S THYROIDITIS: ICD-10-CM

## 2024-09-25 RX ORDER — LEVOTHYROXINE SODIUM 88 UG/1
88 TABLET ORAL
Qty: 90 TABLET | Refills: 1 | Status: SHIPPED | OUTPATIENT
Start: 2024-09-25

## 2024-11-02 PROBLEM — J30.89 ALLERGIC RHINITIS DUE TO MOLD: Status: ACTIVE | Noted: 2024-11-02

## 2024-11-02 PROBLEM — S83.249A TEAR OF MEDIAL MENISCUS OF KNEE: Status: ACTIVE | Noted: 2024-11-02

## 2024-11-16 DIAGNOSIS — J30.9 ALLERGIC RHINITIS, UNSPECIFIED SEASONALITY, UNSPECIFIED TRIGGER: ICD-10-CM

## 2024-11-18 RX ORDER — AZELASTINE HYDROCHLORIDE 137 UG/1
SPRAY, METERED NASAL
Qty: 30 ML | Refills: 1 | Status: SHIPPED | OUTPATIENT
Start: 2024-11-18

## 2024-12-18 ENCOUNTER — OFFICE VISIT (OUTPATIENT)
Dept: CARDIOLOGY CLINIC | Facility: CLINIC | Age: 59
End: 2024-12-18
Payer: COMMERCIAL

## 2024-12-18 VITALS
DIASTOLIC BLOOD PRESSURE: 70 MMHG | BODY MASS INDEX: 33.46 KG/M2 | HEART RATE: 84 BPM | SYSTOLIC BLOOD PRESSURE: 102 MMHG | OXYGEN SATURATION: 98 % | WEIGHT: 239 LBS | HEIGHT: 71 IN

## 2024-12-18 DIAGNOSIS — I65.22 STENOSIS OF LEFT CAROTID ARTERY WITHOUT CEREBRAL INFARCTION: ICD-10-CM

## 2024-12-18 DIAGNOSIS — Z86.79 S/P CATHETER ABLATION OF SLOW PATHWAY: ICD-10-CM

## 2024-12-18 DIAGNOSIS — E78.2 MIXED HYPERLIPIDEMIA: Primary | ICD-10-CM

## 2024-12-18 DIAGNOSIS — Z98.890 S/P CATHETER ABLATION OF SLOW PATHWAY: ICD-10-CM

## 2024-12-18 DIAGNOSIS — G47.33 OSA (OBSTRUCTIVE SLEEP APNEA): ICD-10-CM

## 2024-12-18 PROBLEM — I65.23: Status: ACTIVE | Noted: 2024-12-18

## 2024-12-18 PROCEDURE — 99214 OFFICE O/P EST MOD 30 MIN: CPT | Performed by: INTERNAL MEDICINE

## 2024-12-18 RX ORDER — ROSUVASTATIN CALCIUM 5 MG/1
5 TABLET, COATED ORAL DAILY
Qty: 90 TABLET | Refills: 3 | Status: SHIPPED | OUTPATIENT
Start: 2024-12-18

## 2024-12-18 NOTE — PROGRESS NOTES
Alexey Simmonsbalaji  1965  [id number]  [facility]  [address] [facility] [address]  [address]  [facility] [address] [phone]    1. Mixed hyperlipidemia  rosuvastatin (CRESTOR) 5 mg tablet    Comprehensive metabolic panel      2. Stenosis of left carotid artery without cerebral infarction        3. ADAIR (obstructive sleep apnea)        4. s/p AVNRT ablation with slow pathway modification 12/18/2023            Discussion/Summary:  1. Supraventricular tachycardia/AVNRT  2. Atypical chest pain secondary to 1.  3. Calcium score 14  4. Preserved LV systolic function  5. Dyslipidemia     Recommendations: Overall he has been doing well following SVT ablation.  Blood pressure well-controlled.  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  New carotid lesion.  Follow-up with Doppler every 2 years.  Initiate statin Crestor 5 mg daily.  Goal LDL less than 70.      Interval History:  Very pleasant 57-year-old gentleman who presented last year for an 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 as documented in the chart.  He has a history of occasional palpitations for many years.  During his stress test he went into an AVNRT for about 2 minutes at peak exercise.  He says he gets this problem on occasion.  He was recently playing in a golf tournament he might have been little bit dehydrated and had a couple alcoholic beverages the night before.  He then went into a rapid rhythm with sustained heart rates around 160 according to his Apple watch.  He had a sensation of fullness in his chest and he could feel his heart was racing.  He then broke spontaneously and heart rate came down to the 120s.  But of time he got to the emergency department he was already in sinus tachycardia.  Denies any exertional limitations.  There has been no anginal sounding discomfort.  Denies any shortness of breath, lightheadedness, dizziness, or syncope.  There has been no lower extremity edema    Functional capacity has been excellent.   Denies any chest pain, shortness of breath, palpitations, lightheadedness, dizziness, or syncope.  Has been lower extremity edema, PND, orthopnea.  Status post SVT ablation.    Medical Problems                 Problem List       Wellness examination    Impaired fasting glucose    Vitamin D deficiency    Knee pain    Palpitations    Elevated troponin    SVT (supraventricular tachycardia) (HCC)                  Past Medical History:   Diagnosis Date    Allergic 2000    Arthritis     r foot    History of chicken pox     Hives     as a child    Hypothyroidism     Infectious diarrhea     LAST ASSESSED: [id number]    Overweight     LAST ASSESSED: 13MAR2017    Panic attacks     last one 4-5 months ago    Paresthesias/numbness     LAST ASSESSED: [id number]    Prediabetes     LAST ASSESSED: [id number]    Prehypertension     LAST ASSESSED: [id number]    s/p AVNRT ablation with slow pathway modification 12/18/2023 12/18/2023    SVT (supraventricular tachycardia) ([facility]) 08/01/2022    Tinea versicolor     LAST ASSESSED: 13MAR2017    Varicose vein of leg     r leg    Vitamin D deficiency     LAST ASSESSED: [id number]    Wears glasses      Social History     Socioeconomic History    Marital status: /Civil Union     Spouse name: Grace    Number of children: 4    Years of education: Not on file    Highest education level: Not on file   Occupational History    Occupation:    Tobacco Use    Smoking status: Never     Passive exposure: Never    Smokeless tobacco: Never   Vaping Use    Vaping status: Never Used   Substance and Sexual Activity    Alcohol use: Yes     Alcohol/week: 13.0 standard drinks of alcohol     Types: 10 Cans of beer, 3 Standard drinks or equivalent per week     Comment: SOCIAL    Drug use: No    Sexual activity: Yes     Partners: Female   Other Topics Concern    Not on file   Social History Narrative    Who lives in your home: Wife and two Hungarian bulldogs     What type of home do you live in: Single house    Age  of your home: Built 2005    How long have you been living there: 2005    Type of heat: Forced hot air    Type of fuel: Gas    What type of gisele is in your bedroom: Hardwood floor    Do you have the following in or near your home:    Air products: Central air and Ionic air purifier    Pests: None    Pets: Dogs x 2 (Anthony and Angelina)     Are pets allowed in bedroom: Yes    Open fields, wooded areas nearby: N/A    Basement: Dry and Finished    Exposure to second hand smoke: No        Habits:    Caffeine: coffee 3 cups daily-ice tea 1 16 oz daily    Chocolate: seldom 1 x a week to 2 x a month     Other:              Social Drivers of Health     Financial Resource Strain: Not on file   Food Insecurity: Not on file   Transportation Needs: Not on file   Physical Activity: Sufficiently Active (10/30/2024)    Exercise Vital Sign     Days of Exercise per Week: 5 days     Minutes of Exercise per Session: 50 min   Stress: Not on file   Social Connections: Not on file   Intimate Partner Violence: Not on file   Housing Stability: Not on file      Family History   Problem Relation Age of Onset    Diabetes Mother     Hypertension Mother     Dementia Mother     Arthritis Mother     Prostate cancer Father     Heart attack Father     Stroke Father         Late 80’s    Cancer Father         Prostate    No Known Problems Sister     Diabetes Paternal Aunt     No Known Problems Son     Diabetes Son     No Known Problems Son     No Known Problems Daughter      Past Surgical History:   Procedure Laterality Date    APPENDECTOMY      LAST ASSESSED: [id number]    CARDIAC ELECTROPHYSIOLOGY PROCEDURE N/A 12/18/2023    Procedure: Cardiac eps/svt ablation;  Surgeon: Mahendra Navarrete MD;  Location: [facility];  Service: Cardiology    CHEILECTOMY Right 12/01/2017    Procedure: CHEILECTOMY;  Surgeon: Kenneth Baugh DPM;  Location: [facility];  Service: Podiatry    CIRCUMCISION      COLONOSCOPY      maybe 8 yrs ago from 10/30/24    HIP  "PINNING Left     as a child    HIP SURGERY      LAST ASSESSED: [id number]       Current Outpatient Medications:     Azelastine HCl 137 MCG/SPRAY SOLN, 1 SPRAY INTO EACH NOSTRIL 2 (TWO) TIMES A DAY USE IN EACH NOSTRIL AS DIRECTED, Disp: 30 mL, Rfl: 1    levothyroxine 88 mcg tablet, TAKE 1 TABLET (88 MCG TOTAL) BY MOUTH DAILY IN THE EARLY MORNING, Disp: 90 tablet, Rfl: 1    mometasone (NASONEX) 50 mcg/act nasal spray, SPRAY 2 SPRAYS INTO EACH NOSTRIL EVERY DAY, Disp: 51 g, Rfl: 4    multivitamin (THERAGRAN) TABS, Take 1 tablet by mouth daily in the early morning, Disp: , Rfl:     rosuvastatin (CRESTOR) 5 mg tablet, Take 1 tablet (5 mg total) by mouth daily, Disp: 90 tablet, Rfl: 3  No Known Allergies    Labs:     Chemistry        Component Value Date/Time     03/22/2017 0917    K 4.3 09/12/2024 0814    K 4.3 03/22/2017 0917     09/12/2024 0814     03/22/2017 0917    CO2 30 09/12/2024 0814    CO2 29 03/22/2017 0917    BUN 19 09/12/2024 0814    BUN 17 03/22/2017 0917    CREATININE 0.99 09/12/2024 0814    CREATININE 0.99 03/22/2017 0917        Component Value Date/Time    CALCIUM 9.8 09/12/2024 0814    CALCIUM 9.2 03/22/2017 0917    ALKPHOS 55 09/12/2024 0814    ALKPHOS 56 03/22/2017 0917    AST 20 09/12/2024 0814    AST 22 03/22/2017 0917    ALT 18 09/12/2024 0814    ALT 21 03/22/2017 0917    BILITOT 0.7 03/22/2017 0917            Lab Results   Component Value Date    CHOL 159 03/22/2017    CHOL 174 12/22/2014     Lab Results   Component Value Date    HDL 49 09/12/2024    HDL 56 07/31/2024    HDL 49 03/22/2023     Lab Results   Component Value Date    LDLCALC 114 (H) 09/12/2024    LDLCALC 97 07/31/2024    LDLCALC 119 (H) 03/22/2023     Lab Results   Component Value Date    TRIG 74 09/12/2024    TRIG 69 07/31/2024    TRIG 102 03/22/2023     No results found for: \"CHOLHDL\"    Imaging: XR chest 1 view portable    Result Date: 7/31/2022  Narrative: CHEST INDICATION:   palpitations. COMPARISON:  Chest " "radiograph from 6/9/2009, calcium scoring CT from 6/24/2021. EXAM PERFORMED/VIEWS:  XR CHEST PORTABLE FINDINGS: Cardiomediastinal silhouette appears unremarkable. The lungs are clear.  No pneumothorax or pleural effusion. Osseous structures appear within normal limits for patient age.     Impression: No acute cardiopulmonary disease. Workstation performed: [id number]       ECG:  , PND, orthopnea.    Review of Systems   Constitutional: Negative.   HENT: Negative.     Eyes: Negative.    Cardiovascular:  Negative for chest pain and palpitations.   Respiratory: Negative.     Endocrine: Negative.    Hematologic/Lymphatic: Negative.    Skin: Negative.    Musculoskeletal: Negative.    Gastrointestinal: Negative.    Genitourinary: Negative.    Neurological: Negative.    Psychiatric/Behavioral: Negative.     All other systems reviewed and are negative.      Vitals:    12/18/24 0914   BP: 102/70   Pulse: 84   SpO2: 98%     Vitals:    12/18/24 0914   Weight: 108 kg (239 lb)     Height: 5' 11\" (180.3 cm)   Body mass index is 33.33 kg/m².    Physical Exam:  Vital signs reviewed  General:  Alert and cooperative, appears stated age, no acute distress  HEENT:  PERRLA, EOMI, no scleral icterus, no conjunctival pallor  Neck:  No lymphadenopathy, no thyromegaly, no carotid bruits, no elevated JVP  Heart:  Regular rate and rhythm, normal S1/S2, no S3/S4, no murmur, rubs or gallops.  PMI nondisplaced  Lungs:  Clear to auscultation bilaterally, no wheezes rales or rhonchi  Abdomen:  Soft, non-tender, positive bowel sounds, no rebound or guarding,   no organomegaly   Extremities:  Normal range of motion.  No clubbing, cyanosis or edema   Vascular:  2+ pedal pulses  Skin:  No rashes or lesions on exposed skin  Neurologic:  Cranial nerves II-XII grossly intact without focal deficits  Psych:  Normal mood and affect        "

## 2024-12-19 ENCOUNTER — OFFICE VISIT (OUTPATIENT)
Dept: FAMILY MEDICINE CLINIC | Facility: CLINIC | Age: 59
End: 2024-12-19
Payer: COMMERCIAL

## 2024-12-19 VITALS
RESPIRATION RATE: 16 BRPM | HEIGHT: 71 IN | DIASTOLIC BLOOD PRESSURE: 64 MMHG | HEART RATE: 67 BPM | OXYGEN SATURATION: 97 % | BODY MASS INDEX: 33.49 KG/M2 | TEMPERATURE: 96.5 F | SYSTOLIC BLOOD PRESSURE: 122 MMHG | WEIGHT: 239.2 LBS

## 2024-12-19 DIAGNOSIS — R73.01 IMPAIRED FASTING GLUCOSE: ICD-10-CM

## 2024-12-19 DIAGNOSIS — K21.9 GASTROESOPHAGEAL REFLUX DISEASE WITHOUT ESOPHAGITIS: ICD-10-CM

## 2024-12-19 DIAGNOSIS — G47.33 OSA (OBSTRUCTIVE SLEEP APNEA): Primary | ICD-10-CM

## 2024-12-19 DIAGNOSIS — E06.3 HYPOTHYROIDISM DUE TO HASHIMOTO'S THYROIDITIS: ICD-10-CM

## 2024-12-19 DIAGNOSIS — Z86.79 S/P CATHETER ABLATION OF SLOW PATHWAY: ICD-10-CM

## 2024-12-19 DIAGNOSIS — Z98.890 S/P CATHETER ABLATION OF SLOW PATHWAY: ICD-10-CM

## 2024-12-19 PROCEDURE — 99214 OFFICE O/P EST MOD 30 MIN: CPT | Performed by: FAMILY MEDICINE

## 2024-12-19 RX ORDER — LEVOTHYROXINE SODIUM 88 UG/1
88 TABLET ORAL
Qty: 90 TABLET | Refills: 1 | Status: SHIPPED | OUTPATIENT
Start: 2024-12-19

## 2024-12-19 NOTE — ASSESSMENT & PLAN NOTE
- Tolerating well without concern.  Continue with CPAP use as recommended for minimum of 6 hours every night.

## 2024-12-30 NOTE — ASSESSMENT & PLAN NOTE
-Discussed adequate blood glucose control.  Discussed the importance of healthy levels.  -Dietary lifestyle modifications reviewed.  -Repeat blood work ordered.  -Follow-up in 6 months.  Orders:    Comprehensive metabolic panel; Future    Hemoglobin A1C; Future

## 2024-12-30 NOTE — ASSESSMENT & PLAN NOTE
-Free T4 slightly elevated.  Asymptomatic.  -Transition to levothyroxine 88 mcg every morning only Monday through Friday.  -Will repeat thyroid levels in 6 weeks.  Orders:    levothyroxine 88 mcg tablet; Take 1 tablet (88 mcg total) by mouth daily in the early morning M-F    TSH + Free T4; Future

## 2024-12-30 NOTE — PROGRESS NOTES
Name: Alexey Cummings      : 1965      MRN: [id number]  Encounter Provider: Mode Foley DO  Encounter Date: 2024   Encounter department: [facility]    Assessment & Plan  Hypothyroidism due to Hashimoto's thyroiditis  -Free T4 slightly elevated.  Asymptomatic.  -Transition to levothyroxine 88 mcg every morning only Monday through Friday.  -Will repeat thyroid levels in 6 weeks.  Orders:    levothyroxine 88 mcg tablet; Take 1 tablet (88 mcg total) by mouth daily in the early morning M-F    TSH + Free T4; Future    ADAIR (obstructive sleep apnea)  - Tolerating well without concern.  Continue with CPAP use as recommended for minimum of 6 hours every night.         Impaired fasting glucose  -Discussed adequate blood glucose control.  Discussed the importance of healthy levels.  -Dietary lifestyle modifications reviewed.  -Repeat blood work ordered.  -Follow-up in 6 months.  Orders:    Comprehensive metabolic panel; Future    Hemoglobin A1C; Future    Gastroesophageal reflux disease without esophagitis  Stable.  Continues with antireflux measures.  Can use Pepcid 20 mg as needed.         s/p AVNRT ablation with slow pathway modification 2023  - No further events.  Following with cardiology.              History of Present Illness     Danny is a 59-year-old male presents today for follow-up.  He notes overall he has been doing well.  He is taking all his medications as prescribed.  Denies any concerning symptoms at this time.  He is using his CPAP as recommended and family cannot sleep as much improved.  He continues to work on dietary lifestyle modifications to help with his weight.  He did lose about 15 pounds since starting to watch his nutrition.  He is working on increasing his exercise.  No concerns today.      Review of Systems   Constitutional:  Negative for chills and fever.   HENT:  Negative for ear pain and sore throat.    Eyes:  Negative for pain and visual disturbance.    Respiratory:  Negative for cough and shortness of breath.    Cardiovascular:  Negative for chest pain and palpitations.   Gastrointestinal:  Negative for abdominal pain and vomiting.   Genitourinary:  Negative for dysuria and hematuria.   Musculoskeletal:  Negative for arthralgias and back pain.   Skin:  Negative for color change and rash.   Neurological:  Negative for seizures and syncope.   Psychiatric/Behavioral:  Negative for confusion and sleep disturbance. The patient is not nervous/anxious.    All other systems reviewed and are negative.    Past Medical History:   Diagnosis Date    Allergic 2000    Allergic rhinitis 2010    Arthritis     r foot    History of chicken pox     Hives     as a child    Hypothyroidism     Infectious diarrhea     LAST ASSESSED: 25NOV2012    Overweight     LAST ASSESSED: 13MAR2017    Panic attacks     last one 4-5 months ago    Paresthesias/numbness     LAST ASSESSED: [id number]    Prediabetes     LAST ASSESSED: [id number]    Prehypertension     LAST ASSESSED: [id number]    s/p AVNRT ablation with slow pathway modification 12/18/2023 12/18/2023    SVT (supraventricular tachycardia) ([facility]) 08/01/2022    Tinea versicolor     LAST ASSESSED: 13MAR2017    Varicose vein of leg     r leg    Vitamin D deficiency     LAST ASSESSED: [id number]    Wears glasses      Past Surgical History:   Procedure Laterality Date    APPENDECTOMY      LAST ASSESSED: [id number]    CARDIAC ELECTROPHYSIOLOGY PROCEDURE N/A 12/18/2023    Procedure: Cardiac eps/svt ablation;  Surgeon: Mahendra Navarrete MD;  Location:  CARDIAC CATH LAB;  Service: Cardiology    CHEILECTOMY Right 12/01/2017    Procedure: CHEILECTOMY;  Surgeon: Kenneth Baugh DPM;  Location: [facility];  Service: Podiatry    CIRCUMCISION      COLONOSCOPY      maybe 8 yrs ago from 10/30/24    HIP PINNING Left     as a child    HIP SURGERY      LAST ASSESSED: [id number]     Family History   Problem Relation Age of Onset    Diabetes Mother     Hypertension  "Mother     Dementia Mother     Arthritis Mother     Prostate cancer Father     Heart attack Father         Has a heart attack at age 90    Stroke Father         Late 80’s    Cancer Father         Prostate    No Known Problems Sister     Diabetes Paternal Aunt     No Known Problems Son     Diabetes Son     No Known Problems Son     No Known Problems Daughter      Social History     Tobacco Use    Smoking status: Never     Passive exposure: Never    Smokeless tobacco: Never   Vaping Use    Vaping status: Never Used   Substance and Sexual Activity    Alcohol use: Yes     Alcohol/week: 13.0 standard drinks of alcohol     Types: 10 Cans of beer, 3 Standard drinks or equivalent per week     Comment: SOCIAL    Drug use: No    Sexual activity: Yes     Partners: Female     Current Outpatient Medications on File Prior to Visit   Medication Sig    Azelastine HCl 137 MCG/SPRAY SOLN 1 SPRAY INTO EACH NOSTRIL 2 (TWO) TIMES A DAY USE IN EACH NOSTRIL AS DIRECTED    mometasone (NASONEX) 50 mcg/act nasal spray SPRAY 2 SPRAYS INTO EACH NOSTRIL EVERY DAY    multivitamin (THERAGRAN) TABS Take 1 tablet by mouth daily in the early morning    rosuvastatin (CRESTOR) 5 mg tablet Take 1 tablet (5 mg total) by mouth daily     No Known Allergies  Immunization History   Administered Date(s) Administered    COVID-19 PFIZER VACCINE 0.3 ML IM 03/10/2021, 03/31/2021    INFLUENZA 10/14/2021, 10/20/2022    Influenza Injectable, MDCK, Preservative Free, Quadrivalent, 0.5 mL 11/14/2019    Influenza, injectable, quadrivalent, preservative free 0.5 mL 11/05/2018, 10/26/2020, 12/19/2023    Tdap 12/04/2014, 06/12/2023    Zoster Vaccine Recombinant 10/26/2020     Objective   /64 (BP Location: Left arm, Patient Position: Sitting, Cuff Size: Large)   Pulse 67   Temp (!) 96.5 °F (35.8 °C) (Tympanic)   Resp 16   Ht 5' 11\" (1.803 m)   Wt 109 kg (239 lb 3.2 oz)   SpO2 97%   BMI 33.36 kg/m²     Physical Exam  Vitals and nursing note reviewed. "   Constitutional:       General: He is not in acute distress.     Appearance: Normal appearance.   HENT:      Head: Normocephalic and atraumatic.      Right Ear: Tympanic membrane and external ear normal.      Left Ear: Tympanic membrane and external ear normal.      Nose: Nose normal.      Mouth/Throat:      Mouth: Mucous membranes are moist.   Eyes:      Extraocular Movements: Extraocular movements intact.      Conjunctiva/sclera: Conjunctivae normal.      Pupils: Pupils are equal, round, and reactive to light.   Cardiovascular:      Rate and Rhythm: Normal rate and regular rhythm.      Pulses: Normal pulses.      Heart sounds: Normal heart sounds. No murmur heard.  Pulmonary:      Effort: Pulmonary effort is normal.      Breath sounds: Normal breath sounds. No wheezing, rhonchi or rales.   Abdominal:      General: Bowel sounds are normal.      Palpations: Abdomen is soft.      Tenderness: There is no abdominal tenderness. There is no guarding.   Musculoskeletal:         General: Normal range of motion.      Cervical back: Normal range of motion.      Right lower leg: No edema.      Left lower leg: No edema.   Lymphadenopathy:      Cervical: No cervical adenopathy.   Skin:     General: Skin is warm.      Capillary Refill: Capillary refill takes less than 2 seconds.   Neurological:      General: No focal deficit present.      Mental Status: He is alert and oriented to person, place, and time.   Psychiatric:         Mood and Affect: Mood normal.         Behavior: Behavior normal.

## 2025-01-04 LAB
ALBUMIN SERPL-MCNC: 4.7 G/DL (ref 3.6–5.1)
ALBUMIN/GLOB SERPL: 2 (CALC) (ref 1–2.5)
ALP SERPL-CCNC: 60 U/L (ref 35–144)
ALT SERPL-CCNC: 35 U/L (ref 9–46)
AST SERPL-CCNC: 30 U/L (ref 10–35)
BILIRUB SERPL-MCNC: 0.4 MG/DL (ref 0.2–1.2)
BUN SERPL-MCNC: 22 MG/DL (ref 7–25)
BUN/CREAT SERPL: ABNORMAL (CALC) (ref 6–22)
CALCIUM SERPL-MCNC: 9.7 MG/DL (ref 8.6–10.3)
CHLORIDE SERPL-SCNC: 103 MMOL/L (ref 98–110)
CO2 SERPL-SCNC: 28 MMOL/L (ref 20–32)
CREAT SERPL-MCNC: 0.95 MG/DL (ref 0.7–1.35)
GFR/BSA.PRED SERPLBLD CYS-BASED-ARV: 91 ML/MIN/1.73M2
GLOBULIN SER CALC-MCNC: 2.3 G/DL (CALC) (ref 1.9–3.7)
GLUCOSE SERPL-MCNC: 111 MG/DL (ref 65–99)
POTASSIUM SERPL-SCNC: 4.7 MMOL/L (ref 3.5–5.3)
PROT SERPL-MCNC: 7 G/DL (ref 6.1–8.1)
SODIUM SERPL-SCNC: 138 MMOL/L (ref 135–146)

## 2025-02-04 ENCOUNTER — TELEPHONE (OUTPATIENT)
Age: 60
End: 2025-02-04

## 2025-02-17 ENCOUNTER — TELEPHONE (OUTPATIENT)
Age: 60
End: 2025-02-17

## 2025-02-17 ENCOUNTER — PREP FOR PROCEDURE (OUTPATIENT)
Age: 60
End: 2025-02-17

## 2025-02-17 DIAGNOSIS — Z12.11 ENCOUNTER FOR SCREENING COLONOSCOPY: Primary | ICD-10-CM

## 2025-02-17 NOTE — TELEPHONE ENCOUNTER
Pt called in requesting his lab orders be faxed over to [facility] at [phone]. Orders were faxed.

## 2025-02-17 NOTE — TELEPHONE ENCOUNTER
02/17/25  Screened by: Jarrett Odom    Referring Provider     Pre- Screening:     There is no height or weight on file to calculate BMI.  Has patient been referred for a routine screening Colonoscopy? no  Is the patient between 45-75 years old? yes      Previous Colonoscopy yes   If yes:    Date: 10yr      Does the patient want to see a Gastroenterologist prior to their procedure OR are they having any GI symptoms? no    Has the patient been hospitalized or had abdominal surgery in the past 6 months? no    Does the patient use supplemental oxygen? no    Does the patient take Coumadin, Lovenox, Plavix, Elliquis, Xarelto, or other blood thinning medication? no    Has the patient had a stroke, cardiac event, or stent placed in the past year? no    If patient is between 45yrs - 49yrs, please advise patient that we will have to confirm benefits & coverage with their insurance company for a routine screening colonoscopy.

## 2025-02-17 NOTE — TELEPHONE ENCOUNTER
Patients provider:  Dr. Celis    Number to return call: [phone]    Reason for call: Pt calling to schedule his 10 yr colonoscopy and he asked me to notate for the Dr an issue he had during his last Procedure involving anesthesia; he is not sure what kind of anesthesia was used but in 12/2023 he has a cardio proc for an ablation and during, he started choking due to the anesthesia and they had to wake him and finish it with him awake. He wanted the Dr to be aware as he has not had anesthesia since

## 2025-02-17 NOTE — LETTER
Attached are your prep instructions for your upcoming procedure on 06.06.25. If you have any questions or concerns please contact us at [phone].    Thank you,      [facility]

## 2025-02-17 NOTE — TELEPHONE ENCOUNTER
Scheduled date of colonoscopy (as of today):06.06.25    Physician performing colonoscopy:Lalita    [facility] of colon[facility]    Bowel prep reviewed with patient:Roni/Mckenna    Instructions reviewed with patient by:Lsims and sent to chart

## 2025-02-19 NOTE — TELEPHONE ENCOUNTER
Spoke to pt , he said he had no issue with prior colonoscopy, I told him to discuss with anesthesia prior to procedure

## 2025-03-01 LAB
ALBUMIN SERPL-MCNC: 4.8 G/DL (ref 3.6–5.1)
ALBUMIN/GLOB SERPL: 2 (CALC) (ref 1–2.5)
ALP SERPL-CCNC: 52 U/L (ref 35–144)
ALT SERPL-CCNC: 22 U/L (ref 9–46)
AST SERPL-CCNC: 20 U/L (ref 10–35)
BILIRUB SERPL-MCNC: 0.7 MG/DL (ref 0.2–1.2)
BUN SERPL-MCNC: 17 MG/DL (ref 7–25)
BUN/CREAT SERPL: NORMAL (CALC) (ref 6–22)
CALCIUM SERPL-MCNC: 10 MG/DL (ref 8.6–10.3)
CHLORIDE SERPL-SCNC: 102 MMOL/L (ref 98–110)
CO2 SERPL-SCNC: 29 MMOL/L (ref 20–32)
CREAT SERPL-MCNC: 0.91 MG/DL (ref 0.7–1.35)
GFR/BSA.PRED SERPLBLD CYS-BASED-ARV: 96 ML/MIN/1.73M2
GLOBULIN SER CALC-MCNC: 2.4 G/DL (CALC) (ref 1.9–3.7)
GLUCOSE SERPL-MCNC: 99 MG/DL (ref 65–99)
POTASSIUM SERPL-SCNC: 4.3 MMOL/L (ref 3.5–5.3)
PROT SERPL-MCNC: 7.2 G/DL (ref 6.1–8.1)
SODIUM SERPL-SCNC: 139 MMOL/L (ref 135–146)

## 2025-03-04 ENCOUNTER — RESULTS FOLLOW-UP (OUTPATIENT)
Dept: FAMILY MEDICINE CLINIC | Facility: CLINIC | Age: 60
End: 2025-03-04

## 2025-03-04 DIAGNOSIS — E06.3 HYPOTHYROIDISM DUE TO HASHIMOTO'S THYROIDITIS: Primary | ICD-10-CM

## 2025-03-04 LAB
HBA1C MFR BLD: 6 % OF TOTAL HGB
T4 FREE SERPL-MCNC: 1.4 NG/DL (ref 0.8–1.8)
TSH SERPL-ACNC: 6.01 MIU/L (ref 0.4–4.5)

## 2025-03-04 NOTE — RESULT ENCOUNTER NOTE
Estrada Delgado,    Thank you for getting the repeat labs.  The T4 is a good range.  Lets continue as planned with your levothyroxine only Monday through Friday.  We will plan to recheck the labs about 1 week prior to your visit in June.  Please let me know if you have any other questions.    Thank you  -Dr. DAVID

## 2025-04-09 DIAGNOSIS — E06.3 HYPOTHYROIDISM DUE TO HASHIMOTO'S THYROIDITIS: ICD-10-CM

## 2025-04-10 RX ORDER — LEVOTHYROXINE SODIUM 88 UG/1
88 TABLET ORAL
Qty: 90 TABLET | Refills: 1 | Status: SHIPPED | OUTPATIENT
Start: 2025-04-10

## 2025-04-23 ENCOUNTER — TELEPHONE (OUTPATIENT)
Age: 60
End: 2025-04-23

## 2025-04-23 NOTE — TELEPHONE ENCOUNTER
"Scheduled date of colonoscopy (as of today): 6/20/25  Physician performing colonoscopy: Lalita  [facility]oscopy[facility]  Bowel prep reviewed with patient: Neda/Gama  Instructions already has them   Clearances: N/A    PT:  HT: 5'10\"   WT: 239    BMI: 33.33  "

## 2025-06-06 ENCOUNTER — ANESTHESIA (OUTPATIENT)
Dept: ANESTHESIOLOGY | Facility: HOSPITAL | Age: 60
End: 2025-06-06

## 2025-06-06 ENCOUNTER — ANESTHESIA EVENT (OUTPATIENT)
Dept: ANESTHESIOLOGY | Facility: HOSPITAL | Age: 60
End: 2025-06-06

## 2025-06-20 ENCOUNTER — HOSPITAL ENCOUNTER (OUTPATIENT)
Dept: GASTROENTEROLOGY | Facility: AMBULARY SURGERY CENTER | Age: 60
Setting detail: OUTPATIENT SURGERY
Discharge: HOME | End: 2025-06-20
Attending: COLON & RECTAL SURGERY
Payer: COMMERCIAL

## 2025-06-20 ENCOUNTER — ANESTHESIA (OUTPATIENT)
Dept: GASTROENTEROLOGY | Facility: AMBULARY SURGERY CENTER | Age: 60
End: 2025-06-20
Payer: COMMERCIAL

## 2025-06-20 VITALS
TEMPERATURE: 97 F | HEART RATE: 64 BPM | BODY MASS INDEX: 33.47 KG/M2 | SYSTOLIC BLOOD PRESSURE: 126 MMHG | OXYGEN SATURATION: 98 % | WEIGHT: 233.8 LBS | HEIGHT: 70 IN | DIASTOLIC BLOOD PRESSURE: 84 MMHG | RESPIRATION RATE: 19 BRPM

## 2025-06-20 DIAGNOSIS — Z12.11 ENCOUNTER FOR SCREENING COLONOSCOPY: ICD-10-CM

## 2025-06-20 PROCEDURE — G0121 COLON CA SCRN NOT HI RSK IND: HCPCS | Performed by: COLON & RECTAL SURGERY

## 2025-06-20 RX ORDER — LIDOCAINE HYDROCHLORIDE 10 MG/ML
INJECTION, SOLUTION EPIDURAL; INFILTRATION; INTRACAUDAL; PERINEURAL AS NEEDED
Status: DISCONTINUED | OUTPATIENT
Start: 2025-06-20 | End: 2025-06-20

## 2025-06-20 RX ORDER — SODIUM CHLORIDE, SODIUM LACTATE, POTASSIUM CHLORIDE, CALCIUM CHLORIDE 600; 310; 30; 20 MG/100ML; MG/100ML; MG/100ML; MG/100ML
INJECTION, SOLUTION INTRAVENOUS CONTINUOUS PRN
Status: DISCONTINUED | OUTPATIENT
Start: 2025-06-20 | End: 2025-06-20

## 2025-06-20 RX ORDER — PROPOFOL 10 MG/ML
INJECTION, EMULSION INTRAVENOUS CONTINUOUS PRN
Status: DISCONTINUED | OUTPATIENT
Start: 2025-06-20 | End: 2025-06-20

## 2025-06-20 RX ORDER — PROPOFOL 10 MG/ML
INJECTION, EMULSION INTRAVENOUS AS NEEDED
Status: DISCONTINUED | OUTPATIENT
Start: 2025-06-20 | End: 2025-06-20

## 2025-06-20 RX ADMIN — PROPOFOL 120 MCG/KG/MIN: 10 INJECTION, EMULSION INTRAVENOUS at 09:31

## 2025-06-20 RX ADMIN — LIDOCAINE HYDROCHLORIDE 50 MG: 10 INJECTION, SOLUTION EPIDURAL; INFILTRATION; INTRACAUDAL at 09:31

## 2025-06-20 RX ADMIN — SODIUM CHLORIDE, SODIUM LACTATE, POTASSIUM CHLORIDE, AND CALCIUM CHLORIDE: .6; .31; .03; .02 INJECTION, SOLUTION INTRAVENOUS at 09:28

## 2025-06-20 RX ADMIN — PROPOFOL 80 MG: 10 INJECTION, EMULSION INTRAVENOUS at 09:31

## 2025-06-20 NOTE — H&P
History and Physical   Colon and Rectal Surgery   Alexey Cummings 60 y.o. male MRN: [id number]  Unit/Bed#:  Encounter: [id number]  06/20/25   @NOW    No chief complaint on file.        History of Present Illness   HPI:  Alexey Cummings is a 60 y.o. male who presents for screening colonoscopy.      Historical Information   Past Medical History[1]  Past Surgical History[2]    Meds/Allergies     Not in a hospital admission.    Current Medications[3]    Allergies[4]      Social History   Social History     Substance and Sexual Activity   Alcohol Use Yes    Alcohol/week: 13.0 standard drinks of alcohol    Types: 10 Cans of beer, 3 Standard drinks or equivalent per week    Comment: SOCIAL     Social History     Substance and Sexual Activity   Drug Use No     Tobacco Use History[5]      Family History: Family History[6]      Objective     Current Vitals:      No intake or output data in the 24 hours ending 06/20/25 0853    Physical Exam:  General:  Resting comfortably in bed   Eyes:Sclera anicteric  ENT: Trachea midline  Pulm:  Symmetric chest raise.  No respiratory Distress  CV:  Regular on monitor  Abdomen:  Soft NT ND  Extremities:  No clubbing/ cyanosis/ edema    Lab Results: I have personally reviewed pertinent lab results.    Imaging: Results Review Statement: No pertinent imaging studies reviewed.      ASSESSMENT:  Alexey Cummings is a 60 y.o. male who presents for outpatient colonoscopy.      PLAN:  For colonoscopy    Risks/ Benefits reviewed to include but not limited to anesthesia, bleeding, missed lesions, and colonoscopic perforation requiring surgery.           [1]   Past Medical History:  Diagnosis Date    Allergic 2000    Allergic rhinitis 2010    Arthritis     r foot    History of chicken pox     Hives     as a child    Hypothyroidism     Infectious diarrhea     LAST ASSESSED: 25NOV2012    Overweight     LAST ASSESSED: 13MAR2017    Panic attacks     last one 4-5 months ago    Paresthesias/numbness     LAST  ASSESSED: [id number]    Prediabetes     LAST ASSESSED: 30AUG2017    Prehypertension     LAST ASSESSED: [id number]    s/p AVNRT ablation with slow pathway modification 12/18/2023 12/18/2023    SVT (supraventricular tachycardia) ([facility]) 08/01/2022    Tinea versicolor     LAST ASSESSED: 13MAR2017    Varicose vein of leg     r leg    Vitamin D deficiency     LAST ASSESSED: 30AUG2017    Wears glasses    [2]   Past Surgical History:  Procedure Laterality Date    APPENDECTOMY      LAST ASSESSED: [id number]    CARDIAC ELECTROPHYSIOLOGY PROCEDURE N/A 12/18/2023    Procedure: Cardiac eps/svt ablation;  Surgeon: Mahendra Navarrete MD;  [facility];  Service: Cardiology    CHEILECTOMY Right 12/01/2017    Procedure: CHEILECTOMY;  Surgeon: Kenneth Baugh DPM;  [facility];  Service: Podiatry    CIRCUMCISION      COLONOSCOPY      maybe 8 yrs ago from 10/30/24    HIP PINNING Left     as a child    HIP SURGERY      LAST ASSESSED: [id number]   [3]   Current Outpatient Medications:     Auvi-Q 0.3 MG/0.3ML SOAJ, Inject 0.3 mL (0.3 mg total) into a muscle once for 1 dose, Disp: 0.6 mL, Rfl: 11    Azelastine HCl 137 MCG/SPRAY SOLN, 1 SPRAY INTO EACH NOSTRIL 2 (TWO) TIMES A DAY USE IN EACH NOSTRIL AS DIRECTED, Disp: 30 mL, Rfl: 1    Cholecalciferol 50 MCG (2000 UT) TABS, Take 50 Units by mouth in the morning, Disp: , Rfl:     levothyroxine 88 mcg tablet, Take 1 tablet (88 mcg total) by mouth daily in the early morning M-F, Disp: 90 tablet, Rfl: 1    mometasone (NASONEX) 50 mcg/act nasal spray, SPRAY 2 SPRAYS INTO EACH NOSTRIL EVERY DAY, Disp: 51 g, Rfl: 4    multivitamin (THERAGRAN) TABS, Take 1 tablet by mouth daily in the early morning, Disp: , Rfl:     rosuvastatin (CRESTOR) 5 mg tablet, Take 1 tablet (5 mg total) by mouth daily, Disp: 90 tablet, Rfl: 3    Jarod Grass Pollen Allergen (Grastek) 2800 BAU SUBL, Place 1 tablet (2,800 BAU total) under the tongue daily, Disp: 30 tablet, Rfl: 11  [4] No Known  Allergies  [5]   Social History  Tobacco Use   Smoking Status Never    Passive exposure: Never   Smokeless Tobacco Never   [6]   Family History  Problem Relation Name Age of Onset    Diabetes Mother Mother     Hypertension Mother Mother     Dementia Mother Mother     Arthritis Mother Mother     Prostate cancer Father Father     Heart attack Father Father         Has a heart attack at age 90    Stroke Father Father         Late 80’s    Cancer Father Father         Prostate    No Known Problems Sister Kelsey     Diabetes Paternal Aunt None     No Known Problems Son Austen     Diabetes Son Mode     No Known Problems Son Lupillo     No Known Problems Daughter Cata

## 2025-06-20 NOTE — ANESTHESIA POSTPROCEDURE EVALUATION
Post-Op Assessment Note    CV Status:  Stable  Pain Score: 0    Pain management: adequate       Mental Status:  Alert and awake   Hydration Status:  Euvolemic   PONV Controlled:  Controlled   Airway Patency:  Patent     Post Op Vitals Reviewed: Yes    No anethesia notable event occurred.    Staff: CRNA           Last Filed PACU Vitals:  Vitals Value Taken Time   Temp 97 °F (36.1 °C) 06/20/25 09:47   Pulse 76 06/20/25 09:47   /85 06/20/25 09:47   Resp 18 06/20/25 09:47   SpO2 99 % 06/20/25 09:47       Modified Dequan:     Vitals Value Taken Time   Activity 2 06/20/25 09:48   Respiration 2 06/20/25 09:48   Circulation 2 06/20/25 09:48   Consciousness 1 06/20/25 09:48   Oxygen Saturation 2 06/20/25 09:48     Modified Dequan Score: 9

## 2025-06-20 NOTE — ANESTHESIA PREPROCEDURE EVALUATION
Procedure:  COLONOSCOPY    Relevant Problems   CARDIO   (+) Stenosis of left carotid artery without cerebral infarction      ENDO   (+) Hypothyroidism due to Hashimoto's thyroiditis      GI/HEPATIC   (+) Gastroesophageal reflux disease without esophagitis      PULMONARY   (+) ADAIR (obstructive sleep apnea)        Physical Exam    Airway     Mallampati score: II  TM Distance: >3 FB  Neck ROM: full      Cardiovascular  Cardiovascular exam normal    Dental       Pulmonary  Pulmonary exam normal     Neurological      Other Findings      Anesthesia Plan  ASA Score- 3     Anesthesia Type- IV sedation with anesthesia with ASA Monitors.         Additional Monitors:     Airway Plan:            Plan Factors-    Chart reviewed. EKG reviewed.  Existing labs reviewed. Patient summary reviewed.                  Induction-     Postoperative Plan- .   Monitoring Plan - Monitoring plan - standard ASA monitoring          Informed Consent- Anesthetic plan and risks discussed with patient.  I personally reviewed this patient with the CRNA. Discussed and agreed on the Anesthesia Plan with the CRNA..      NPO Status:  No vitals data found for the desired time range.

## 2025-06-27 ENCOUNTER — OFFICE VISIT (OUTPATIENT)
Dept: FAMILY MEDICINE CLINIC | Facility: CLINIC | Age: 60
End: 2025-06-27
Payer: COMMERCIAL

## 2025-06-27 VITALS
RESPIRATION RATE: 18 BRPM | DIASTOLIC BLOOD PRESSURE: 84 MMHG | HEIGHT: 70 IN | SYSTOLIC BLOOD PRESSURE: 120 MMHG | OXYGEN SATURATION: 98 % | HEART RATE: 61 BPM | TEMPERATURE: 96.6 F | BODY MASS INDEX: 33.87 KG/M2 | WEIGHT: 236.6 LBS

## 2025-06-27 DIAGNOSIS — R73.01 IMPAIRED FASTING GLUCOSE: ICD-10-CM

## 2025-06-27 DIAGNOSIS — Z00.00 ANNUAL PHYSICAL EXAM: ICD-10-CM

## 2025-06-27 DIAGNOSIS — E06.3 HYPOTHYROIDISM DUE TO HASHIMOTO'S THYROIDITIS: Primary | ICD-10-CM

## 2025-06-27 DIAGNOSIS — Z86.79 S/P CATHETER ABLATION OF SLOW PATHWAY: ICD-10-CM

## 2025-06-27 DIAGNOSIS — Z98.890 S/P CATHETER ABLATION OF SLOW PATHWAY: ICD-10-CM

## 2025-06-27 DIAGNOSIS — E78.5 MILD HYPERLIPIDEMIA: ICD-10-CM

## 2025-06-27 DIAGNOSIS — K21.9 GASTROESOPHAGEAL REFLUX DISEASE WITHOUT ESOPHAGITIS: ICD-10-CM

## 2025-06-27 DIAGNOSIS — G47.33 OSA (OBSTRUCTIVE SLEEP APNEA): ICD-10-CM

## 2025-06-27 PROCEDURE — 99396 PREV VISIT EST AGE 40-64: CPT | Performed by: FAMILY MEDICINE

## 2025-06-27 PROCEDURE — 99214 OFFICE O/P EST MOD 30 MIN: CPT | Performed by: FAMILY MEDICINE

## 2025-06-27 NOTE — PATIENT INSTRUCTIONS
"Patient Education     Routine physical for adults   The Basics   Written by the doctors and editors at [facility]   What is a physical? -- A physical is a routine visit, or \"check-up,\" with your doctor. You might also hear it called a \"wellness visit\" or \"preventive visit.\"  During each visit, the doctor will:   Ask about your physical and mental health   Ask about your habits, behaviors, and lifestyle   Do an exam   Give you vaccines if needed   Talk to you about any medicines you take   Give advice about your health   Answer your questions  Getting regular check-ups is an important part of taking care of your health. It can help your doctor find and treat any problems you have. But it's also important for preventing health problems.  A routine physical is different from a \"sick visit.\" A sick visit is when you see a doctor because of a health concern or problem. Since physicals are scheduled ahead of time, you can think about what you want to ask the doctor.  How often should I get a physical? -- It depends on your age and health. In general, for people age 21 years and older:   If you are younger than 50 years, you might be able to get a physical every 3 years.   If you are 50 years or older, your doctor might recommend a physical every year.  If you have an ongoing health condition, like diabetes or high blood pressure, your doctor will probably want to see you more often.  What happens during a physical? -- In general, each visit will include:   Physical exam - The doctor or nurse will check your height, weight, heart rate, and blood pressure. They will also look at your eyes and ears. They will ask about how you are feeling and whether you have any symptoms that bother you.   Medicines - It's a good idea to bring a list of all the medicines you take to each doctor visit. Your doctor will talk to you about your medicines and answer any questions. Tell them if you are having any side effects that bother you. You " "should also tell them if you are having trouble paying for any of your medicines.   Habits and behaviors - This includes:   Your diet   Your exercise habits   Whether you smoke, drink alcohol, or use drugs   Whether you are sexually active   Whether you feel safe at home  Your doctor will talk to you about things you can do to improve your health and lower your risk of health problems. They will also offer help and support. For example, if you want to quit smoking, they can give you advice and might prescribe medicines. If you want to improve your diet or get more physical activity, they can help you with this, too.   Lab tests, if needed - The tests you get will depend on your age and situation. For example, your doctor might want to check your:   Cholesterol   Blood sugar   Iron level   Vaccines - The recommended vaccines will depend on your age, health, and what vaccines you already had. Vaccines are very important because they can prevent certain serious or deadly infections.   Discussion of screening - \"Screening\" means checking for diseases or other health problems before they cause symptoms. Your doctor can recommend screening based on your age, risk, and preferences. This might include tests to check for:   Cancer, such as breast, prostate, cervical, ovarian, colorectal, prostate, lung, or skin cancer   Sexually transmitted infections, such as chlamydia and gonorrhea   Mental health conditions like depression and anxiety  Your doctor will talk to you about the different types of screening tests. They can help you decide which screenings to have. They can also explain what the results might mean.   Answering questions - The physical is a good time to ask the doctor or nurse questions about your health. If needed, they can refer you to other doctors or specialists, too.  Adults older than 65 years often need other care, too. As you get older, your doctor will talk to you about:   How to prevent falling at " home   Hearing or vision tests   Memory testing   How to take your medicines safely   Making sure that you have the help and support you need at home  All topics are updated as new evidence becomes available and our peer review process is complete.  This topic retrieved from [employer] on: May 02, 2024.  Topic 303182 Version 1.0  Release: 32.4.3 - C32.122  © 2024 UpToDate, Inc. and/or its affiliates. All rights reserved.  Consumer Information Use and Disclaimer   Disclaimer: This generalized information is a limited summary of diagnosis, treatment, and/or medication information. It is not meant to be comprehensive and should be used as a tool to help the user understand and/or assess potential diagnostic and treatment options. It does NOT include all information about conditions, treatments, medications, side effects, or risks that may apply to a specific patient. It is not intended to be medical advice or a substitute for the medical advice, diagnosis, or treatment of a health care provider based on the health care provider's examination and assessment of a patient's specific and unique circumstances. Patients must speak with a health care provider for complete information about their health, medical questions, and treatment options, including any risks or benefits regarding use of medications. This information does not endorse any treatments or medications as safe, effective, or approved for treating a specific patient. UpToDate, Inc. and its affiliates disclaim any warranty or liability relating to this information or the use thereof.The use of this information is governed by the Terms of Use, available at https://www.woltersStoryWorthuwer.com/en/know/clinical-effectiveness-terms. 2024© UpToDate, Inc. and its affiliates and/or licensors. All rights reserved.  Copyright   © 2024 UpToDate, Inc. and/or its affiliates. All rights reserved.

## 2025-06-27 NOTE — ASSESSMENT & PLAN NOTE
Stable.  Continue CPAP as recommended.  Getting at least 7 hours on the machine.  Continue with sleep medicine as recommended.

## 2025-06-27 NOTE — PROGRESS NOTES
Adult Annual Physical  Name: Alexey Cummings      : 1965      MRN: [id number]  Encounter Provider: Mode Foley DO  Encounter Date: 2025   Encounter department: [facility]    :  Assessment & Plan  Hypothyroidism due to Hashimoto's thyroiditis  Stable.  Continue levothyroxine 88 mcg Monday through Friday.  Repeat thyroid labs       Annual physical exam         ADAIR (obstructive sleep apnea)  Stable.  Continue CPAP as recommended.  Getting at least 7 hours on the machine.  Continue with sleep medicine as recommended.       Gastroesophageal reflux disease without esophagitis  Well-controlled.  Continue antireflux measures       Impaired fasting glucose  -Continue diet and lifestyle interventions  - Follow up in 6 months    Orders:  •  Comprehensive metabolic panel; Future  •  Hemoglobin A1C; Future    Mild hyperlipidemia  Continue rosuvastatin 5 mg p.o. daily.  -Continue diet and lifestyle interventions  - Follow up in 6 months    Orders:  •  Lipid Panel with Direct LDL reflex; Future    s/p AVNRT ablation with slow pathway modification 2023  - Continues to be asymptomatic.  Following with cardiology.               Preventive Screenings:  - Diabetes Screening: screening up-to-date and orders placed  - Cholesterol Screening: has hyperlipidemia and orders placed   - Hepatitis C screening: screening up-to-date   - HIV screening: screening up-to-date   - Colon cancer screening: screening up-to-date   - Lung cancer screening: screening not indicated   - Prostate cancer screening: screening up-to-date     Immunizations:  - Immunizations due: Prevnar 20 and Zoster (Shingrix)         History of Present Illness     Adult Annual Physical:  Patient presents for annual physical.     Diet and Physical Activity:  - Diet/Nutrition: no special diet and intermittent fasting.  - Exercise: walking, moderate cardiovascular exercise, 5-7 times a week on average and 30-60 minutes on  "average.    Depression Screening:  - PHQ-2 Score: 0    General Health:  - Sleep: sleeps well and 7-8 hours of sleep on average.  - Hearing: normal hearing right ear and normal hearing left ear.  - Vision: most recent eye exam < 1 year ago and wears glasses.  - Dental: regular dental visits and brushes teeth twice daily.    /GYN Health:  - Follows with GYN: no.   - History of STDs: no     Health:  - History of STDs: no.   - Urinary symptoms: none.     Advanced Care Planning:  - Has an advanced directive?: no    - Has a durable medical POA?: no      Review of Systems   Constitutional:  Negative for chills and fever.   HENT:  Negative for ear pain and sore throat.    Eyes:  Negative for pain and visual disturbance.   Respiratory:  Negative for cough and shortness of breath.    Cardiovascular:  Negative for chest pain and palpitations.   Gastrointestinal:  Negative for abdominal pain and vomiting.   Genitourinary:  Negative for dysuria and hematuria.   Musculoskeletal:  Negative for arthralgias and back pain.   Skin:  Negative for color change and rash.   Neurological:  Negative for seizures and syncope.   Psychiatric/Behavioral:  Negative for confusion, sleep disturbance and suicidal ideas. The patient is not nervous/anxious.    All other systems reviewed and are negative.        Objective   /84 (BP Location: Left arm, Patient Position: Sitting, Cuff Size: Large)   Pulse 61   Temp (!) 96.6 °F (35.9 °C) (Tympanic)   Resp 18   Ht 5' 10.35\" (1.787 m)   Wt 107 kg (236 lb 9.6 oz)   SpO2 98%   BMI 33.61 kg/m²     Physical Exam  Vitals and nursing note reviewed.   Constitutional:       General: He is not in acute distress.     Appearance: Normal appearance.   HENT:      Head: Normocephalic and atraumatic.      Right Ear: Tympanic membrane and external ear normal.      Left Ear: Tympanic membrane and external ear normal.      Nose: Nose normal.      Mouth/Throat:      Mouth: Mucous membranes are moist. "     Eyes:      Extraocular Movements: Extraocular movements intact.      Conjunctiva/sclera: Conjunctivae normal.      Pupils: Pupils are equal, round, and reactive to light.       Cardiovascular:      Rate and Rhythm: Normal rate and regular rhythm.      Pulses: Normal pulses.      Heart sounds: Normal heart sounds. No murmur heard.  Pulmonary:      Effort: Pulmonary effort is normal.      Breath sounds: Normal breath sounds. No wheezing, rhonchi or rales.   Abdominal:      General: Bowel sounds are normal.      Palpations: Abdomen is soft.      Tenderness: There is no abdominal tenderness. There is no guarding.     Musculoskeletal:         General: Normal range of motion.      Cervical back: Normal range of motion.      Right lower leg: No edema.      Left lower leg: No edema.   Lymphadenopathy:      Cervical: No cervical adenopathy.     Skin:     General: Skin is warm.      Capillary Refill: Capillary refill takes less than 2 seconds.     Neurological:      General: No focal deficit present.      Mental Status: He is alert and oriented to person, place, and time.     Psychiatric:         Mood and Affect: Mood normal.         Behavior: Behavior normal.

## 2025-06-27 NOTE — ASSESSMENT & PLAN NOTE
-Continue diet and lifestyle interventions  - Follow up in 6 months    Orders:  •  Comprehensive metabolic panel; Future  •  Hemoglobin A1C; Future

## (undated) DEVICE — CAST PADDING 4 IN SYNTHETIC NON-STRL

## (undated) DEVICE — PINNACLE INTRODUCER SHEATH: Brand: PINNACLE

## (undated) DEVICE — 10FR FRAZIER SUCTION HANDLE: Brand: CARDINAL HEALTH

## (undated) DEVICE — CURITY NON-ADHERENT STRIPS: Brand: CURITY

## (undated) DEVICE — CATH EP 5FR QUAD SUPREME CRD

## (undated) DEVICE — UNIVERSAL  MINOR EXTREMITY PK: Brand: CARDINAL HEALTH

## (undated) DEVICE — Device: Brand: DECANAV

## (undated) DEVICE — SCD SEQUENTIAL COMPRESSION COMFORT SLEEVE MEDIUM KNEE LENGTH: Brand: KENDALL SCD

## (undated) DEVICE — 22.5 MM X 6.9 MM X 0.53 MM SAGITTAL BLADE

## (undated) DEVICE — INTENDED FOR TISSUE SEPARATION, AND OTHER PROCEDURES THAT REQUIRE A SHARP SURGICAL BLADE TO PUNCTURE OR CUT.: Brand: BARD-PARKER ® CARBON RIB-BACK BLADES

## (undated) DEVICE — SUT VICRYL 3-0 PS-2 27 IN J427H

## (undated) DEVICE — Device: Brand: EZ STEER NAV

## (undated) DEVICE — CUFF TOURNIQUET 18 X 4 IN QUICK CONNECT DISP 1 BLADDER

## (undated) DEVICE — TUBING SUCTION 5MM X 12 FT

## (undated) DEVICE — GLOVE SRG BIOGEL 8

## (undated) DEVICE — CHLORAPREP HI-LITE 26ML ORANGE

## (undated) DEVICE — PAD CAST 4 IN COTTON NON STERILE

## (undated) DEVICE — Device: Brand: REFERENCE PATCH CARTO 3

## (undated) DEVICE — SYRINGE 5ML LL

## (undated) DEVICE — REM POLYHESIVE ADULT PATIENT RETURN ELECTRODE: Brand: VALLEYLAB

## (undated) DEVICE — NEEDLE 18 G X 1 1/2

## (undated) DEVICE — SUT ETHILON 4-0 PS-2 18 IN 1667H

## (undated) DEVICE — Device

## (undated) DEVICE — SUT VICRYL 4-0 PS-2 27 IN J426H

## (undated) DEVICE — NEEDLE 25G X 1 1/2

## (undated) DEVICE — STRETCH BANDAGE: Brand: CURITY

## (undated) DEVICE — 2000CC GUARDIAN II: Brand: GUARDIAN

## (undated) DEVICE — STOCKINETTE REGULAR

## (undated) DEVICE — POV-IOD SOLUTION 4OZ BT